# Patient Record
Sex: FEMALE | Race: WHITE | NOT HISPANIC OR LATINO | Employment: OTHER | URBAN - METROPOLITAN AREA
[De-identification: names, ages, dates, MRNs, and addresses within clinical notes are randomized per-mention and may not be internally consistent; named-entity substitution may affect disease eponyms.]

---

## 2018-11-15 ENCOUNTER — OFFICE VISIT CONVERTED (OUTPATIENT)
Dept: NEUROSURGERY | Facility: CLINIC | Age: 28
End: 2018-11-15
Attending: PHYSICIAN ASSISTANT

## 2018-11-15 ENCOUNTER — CONVERSION ENCOUNTER (OUTPATIENT)
Dept: NEUROLOGY | Facility: CLINIC | Age: 28
End: 2018-11-15

## 2019-01-31 ENCOUNTER — HOSPITAL ENCOUNTER (OUTPATIENT)
Dept: PHYSICAL THERAPY | Facility: CLINIC | Age: 29
Discharge: HOME | End: 2019-01-31
Attending: EMERGENCY MEDICINE

## 2019-05-04 ENCOUNTER — HOSPITAL ENCOUNTER (OUTPATIENT)
Dept: URGENT CARE | Facility: CLINIC | Age: 29
Discharge: HOME | End: 2019-05-04
Attending: FAMILY MEDICINE

## 2019-05-06 LAB — BACTERIA SPEC AEROBE CULT: NORMAL

## 2019-06-04 ENCOUNTER — HOSPITAL ENCOUNTER (OUTPATIENT)
Dept: URGENT CARE | Facility: CLINIC | Age: 29
Discharge: HOME | End: 2019-06-04
Attending: FAMILY MEDICINE

## 2019-08-22 ENCOUNTER — HOSPITAL ENCOUNTER (OUTPATIENT)
Dept: URGENT CARE | Facility: CLINIC | Age: 29
Discharge: HOME | End: 2019-08-22

## 2019-12-04 ENCOUNTER — OFFICE VISIT CONVERTED (OUTPATIENT)
Dept: PODIATRY | Facility: CLINIC | Age: 29
End: 2019-12-04
Attending: PODIATRIST

## 2019-12-11 ENCOUNTER — HOSPITAL ENCOUNTER (OUTPATIENT)
Dept: URGENT CARE | Facility: CLINIC | Age: 29
Discharge: HOME | End: 2019-12-11
Attending: FAMILY MEDICINE

## 2019-12-17 ENCOUNTER — HOSPITAL ENCOUNTER (OUTPATIENT)
Dept: GENERAL RADIOLOGY | Facility: HOSPITAL | Age: 29
Discharge: HOME | End: 2019-12-17
Attending: PODIATRIST

## 2019-12-18 ENCOUNTER — OFFICE VISIT CONVERTED (OUTPATIENT)
Dept: PODIATRY | Facility: CLINIC | Age: 29
End: 2019-12-18
Attending: PODIATRIST

## 2019-12-18 ENCOUNTER — CONVERSION ENCOUNTER (OUTPATIENT)
Dept: PODIATRY | Facility: CLINIC | Age: 29
End: 2019-12-18

## 2020-11-24 ENCOUNTER — OFFICE VISIT CONVERTED (OUTPATIENT)
Dept: SURGERY | Facility: CLINIC | Age: 30
End: 2020-11-24
Attending: SURGERY

## 2020-12-05 ENCOUNTER — HOSPITAL ENCOUNTER (OUTPATIENT)
Dept: PREADMISSION TESTING | Facility: HOSPITAL | Age: 30
Discharge: HOME | End: 2020-12-05
Attending: SURGERY

## 2020-12-06 LAB — SARS-COV-2 RNA SPEC QL NAA+PROBE: NOT DETECTED

## 2020-12-10 ENCOUNTER — HOSPITAL ENCOUNTER (OUTPATIENT)
Dept: PERIOP | Facility: HOSPITAL | Age: 30
Setting detail: HOSPITAL OUTPATIENT SURGERY
Discharge: HOME | End: 2020-12-10
Attending: SURGERY

## 2020-12-10 LAB — HCG UR QL: NEGATIVE

## 2020-12-21 ENCOUNTER — OFFICE VISIT CONVERTED (OUTPATIENT)
Dept: SURGERY | Facility: CLINIC | Age: 30
End: 2020-12-21
Attending: NURSE PRACTITIONER

## 2020-12-23 ENCOUNTER — OFFICE VISIT CONVERTED (OUTPATIENT)
Dept: SURGERY | Facility: CLINIC | Age: 30
End: 2020-12-23
Attending: NURSE PRACTITIONER

## 2020-12-29 ENCOUNTER — OFFICE VISIT CONVERTED (OUTPATIENT)
Dept: SURGERY | Facility: CLINIC | Age: 30
End: 2020-12-29
Attending: SURGERY

## 2021-05-10 NOTE — H&P
History and Physical      Patient Name: Meghan Lui   Patient ID: [id number]   Sex: Female   YOB: 1990    Primary Care Provider: Dawna Flynn   Referring Provider: Dawna Flynn    Visit Date: December 23, 2020    Provider: DARINEL Wood   Location: [facility]   Location Address: [address] [place] [address]  [id number]   Location Phone: [phone]          Chief Complaint  · Follow Up      History Of Present Illness  Meghan Lui is a 30 year old /White female who is here to follow up follow-up exam.      Patient presents today for a follow-up visit  with complaints of incision open & draining. Patient underwent an excision of inflamed skin and subcutaneous left thigh on 12/10/2020 performed by Dr. Edd Jaramillo. Pathology was consistent with Hidradenitis. Patient denies any fever or chills.       Past Medical History  Disease Name Date Onset Notes   Arthritis --  --    Arthritis --  --    Bladder problem --  --    Blood Diseases --  --    Degenerative Disc Disease  --  --    Foot pain, left 12/04/2019 --    Heel pain --  --    Herniated Disc --  --    Hypothyroidism --  --    Ingrown toenail --  --    Leg pain --  --    Leg swelling --  --    Lumbago --  --    Migraines --  --    Mood disorder --  --    Muscle cramps --  --    Numbness in feet --  --    Thyroid disease --  --          Past Surgical History  Procedure Name Date Notes   C section --  --    Cholecystecomy --  --    Dilatation and curettage --  --    Gallbladder --  --    [place] Tooth Extraction --  --          Medication List  Name Date Started Instructions   cyclobenzaprine 10 mg oral tablet  take 1 tablet (10 mg) by oral route 2 times per day   duloxetine 60 mg oral capsule,delayed release(/EC)  take 1 capsule (60 mg) by oral route once daily   gabapentin 600 mg oral tablet  take 1 tablet (600 mg) by oral route 3 times per day   hydrocodone-acetaminophen 7.5-325 mg oral tablet  take 1  "tablet by oral route every 6 hours as needed for pain   hydroxyzine HCl 25 mg oral tablet  take 1 tablet (25 mg) by oral route 3 times per day   levothyroxine 100 mcg oral tablet  take 1 tablet (100 mcg) by oral route once daily         Allergy List  Allergen Name Date Reaction Notes   NO KNOWN DRUG ALLERGIES --  --  --        Allergies Reconciled  Family Medical History  Disease Name Relative/Age Notes   Diabetes, unspecified type Mother/   Mother   Family history of Arthritis Sister/   Sister   Family history of cancer Father/   Father   Family history of diabetes mellitus Mother/   Mother         Social History  Finding Status Start/Stop Quantity Notes   Alcohol Current some day --/-- --  rarely drinks, less than 1 drink per day, has been drinking for 11-20 years   Homemaker --  --/-- --  --    lives alone --  --/-- --  --    Single --  --/-- --  --    Tobacco Current some day --/-- 0.5 PPD current some day smoker, 0.5 pack per day, smoked 11-20 years         Review of Systems  · Constitutional  o Denies  o : chills, fever  · Eyes  o Denies  o : yellowish discoloration of eyes  · HENT  o Denies  o : difficulty swallowing  · Cardiovascular  o Denies  o : chest pain on exertion  · Respiratory  o Denies  o : shortness of breath  · Gastrointestinal  o Denies  o : nausea, vomiting, diarrhea, constipation  · Genitourinary  o Denies  o : abnormal color of urine  · Integument  o Admits  o : changes to existing skin lesions or moles  o Denies  o : rash  · Neurologic  o Denies  o : tingling or numbness  · Musculoskeletal  o Denies  o : joint pain  · Endocrine  o Denies  o : weight gain, weight loss      Vitals  Date Time BP Position Site L\R Cuff Size HR RR TEMP (F) WT  HT  BMI kg/m2 BSA m2 O2 Sat FR L/min FiO2        12/23/2020 09:44 AM       14  281lbs 4oz 6'  1\" 37.11 2.56             Physical Examination  · Constitutional  o Appearance  o : well developed, well-nourished, patient in no apparent distress  · Head and " Face  o Head  o :   § Inspection  § : atraumatic, normocephalic  o Face  o :   § Inspection  § : no facial lesions  · Eyes  o Conjunctivae  o : conjunctivae normal  o Sclerae  o : sclerae white  · Neck  o Inspection/Palpation  o : normal appearance, no masses or tenderness, trachea midline  · Respiratory  o Respiratory Effort  o : breathing unlabored  · Skin and Subcutaneous Tissue  o General Inspection  o : no lesions present, no areas of discoloration, skin turgor normal, texture normal  · Neurologic  o Mental Status Examination  o :   § Orientation  § : grossly oriented to person, place and time  § Attention  § : attention normal, concentration abilities normal  § Fund of Knowledge  § : fund of knowledge within normal limits, patient aware of current events  o Gait and Station  o : normal gait, able to stand without difficulty  · Psychiatric  o Judgement and Insight  o : judgment and insight intact  o Mood and Affect  o : mood normal, affect appropriate     Inner left thigh: Surgical incisions are c/d/I without erythema.  Anterior incision is where the patient is in question of. Could not get any drainage from this incision.               Assessment  · Hidradenitis     705.83/L73.2    Problems Reconciled  Plan  · Medications  o Medications have been Reconciled  o Transition of Care or Provider Policy  · Instructions  o Keep surgical incisions clean and dry.  o Follow-up with Dr. Jaramillo in 1 week.  o Electronically Identified Patient Education Materials Provided Electronically            Electronically Signed by: DARINEL Wood -Author on December 23, 2020 10:26:46 AM

## 2021-05-10 NOTE — H&P
History and Physical      Patient Name: Meghan Lui   Patient ID: [id number]   Sex: Female   YOB: 1990    Primary Care Provider: Dawna Flynn   Referring Provider: Dawna Flynn    Visit Date: December 21, 2020    Provider: DARINEL Wood   Location: [facility]   Location Address: [address] [place] [address]  [id number]   Location Phone: [phone]          Chief Complaint  · Follow Up Post Surgery      History Of Present Illness  Meghan Lui is a 30 year old /White female who is here to follow up status post surgery.      Patient presents today for a follow-up visit after undergoing an excision of inflamed skin and subcutaneous left thigh. Pathology was consistent with Hidradenitis. Patient denies any fever or chills. Denies any post operative complications.       Past Medical History  Disease Name Date Onset Notes   Arthritis --  --    Arthritis --  --    Bladder problem --  --    Blood Diseases --  --    Degenerative Disc Disease  --  --    Foot pain, left 12/04/2019 --    Heel pain --  --    Herniated Disc --  --    Hypothyroidism --  --    Ingrown toenail --  --    Leg pain --  --    Leg swelling --  --    Lumbago --  --    Migraines --  --    Mood disorder --  --    Muscle cramps --  --    Numbness in feet --  --    Thyroid disease --  --          Past Surgical History  Procedure Name Date Notes   C section --  --    Cholecystecomy --  --    Dilatation and curettage --  --    Gallbladder --  --    [place] Tooth Extraction --  --          Medication List  Name Date Started Instructions   cyclobenzaprine 10 mg oral tablet  take 1 tablet (10 mg) by oral route 2 times per day   duloxetine 60 mg oral capsule,delayed release(DR/EC)  take 1 capsule (60 mg) by oral route once daily   gabapentin 600 mg oral tablet  take 1 tablet (600 mg) by oral route 3 times per day   hydrocodone-acetaminophen 7.5-325 mg oral tablet  take 1 tablet by oral route every 6  "hours as needed for pain   hydroxyzine HCl 25 mg oral tablet  take 1 tablet (25 mg) by oral route 3 times per day   levothyroxine 100 mcg oral tablet  take 1 tablet (100 mcg) by oral route once daily         Allergy List  Allergen Name Date Reaction Notes   NO KNOWN DRUG ALLERGIES --  --  --        Allergies Reconciled  Family Medical History  Disease Name Relative/Age Notes   Diabetes, unspecified type Mother/   Mother   Family history of Arthritis Sister/   Sister   Family history of cancer Father/   Father   Family history of diabetes mellitus Mother/   Mother         Social History  Finding Status Start/Stop Quantity Notes   Alcohol Current some day --/-- --  rarely drinks, less than 1 drink per day, has been drinking for 11-20 years   Homemaker --  --/-- --  --    lives alone --  --/-- --  --    Single --  --/-- --  --    Tobacco Current some day --/-- 0.5 PPD current some day smoker, 0.5 pack per day, smoked 11-20 years         Review of Systems  · Constitutional  o Denies  o : chills, fever  · Eyes  o Denies  o : yellowish discoloration of eyes  · HENT  o Denies  o : difficulty swallowing  · Cardiovascular  o Denies  o : chest pain on exertion  · Respiratory  o Denies  o : shortness of breath  · Gastrointestinal  o Denies  o : nausea, vomiting, diarrhea, constipation  · Genitourinary  o Denies  o : abnormal color of urine  · Integument  o Denies  o : rash  · Neurologic  o Denies  o : tingling or numbness  · Musculoskeletal  o Denies  o : joint pain  · Endocrine  o Denies  o : weight gain, weight loss      Vitals  Date Time BP Position Site L\R Cuff Size HR RR TEMP (F) WT  HT  BMI kg/m2 BSA m2 O2 Sat FR L/min FiO2        12/21/2020 03:30 PM       14  280lbs 0oz 6'  1\" 36.94 2.56             Physical Examination  · Constitutional  o Appearance  o : well developed, well-nourished, patient in no apparent distress  · Head and Face  o Head  o :   § Inspection  § : atraumatic, normocephalic  o Face  o : "   § Inspection  § : no facial lesions  · Eyes  o Conjunctivae  o : conjunctivae normal  o Sclerae  o : sclerae white  · Neck  o Inspection/Palpation  o : normal appearance, no masses or tenderness, trachea midline  · Respiratory  o Respiratory Effort  o : breathing unlabored  · Skin and Subcutaneous Tissue  o General Inspection  o : no lesions present, no areas of discoloration, skin turgor normal, texture normal  · Neurologic  o Mental Status Examination  o :   § Orientation  § : grossly oriented to person, place and time  § Attention  § : attention normal, concentration abilities normal  § Fund of Knowledge  § : fund of knowledge within normal limits, patient aware of current events  o Gait and Station  o : normal gait, able to stand without difficulty  · Psychiatric  o Judgement and Insight  o : judgment and insight intact  o Mood and Affect  o : mood normal, affect appropriate     Inner left thigh: Surgical incisions are c/d/I without erythema. Sutures are present. Removed all sutures today and applied steri strips.           Assessment  · Postoperative Exam Following Surgery     V67.00/Z09  · Hidradenitis     705.83/L73.2      Plan  · Medications  o Medications have been Reconciled  o Transition of Care or Provider Policy  · Instructions  o Keep surgical incisions clean and dry.  o Follow-up with Dr. Jaramillo in 1 week.  o Electronically Identified Patient Education Materials Provided Electronically  · Disposition  o Call or Return if symptoms worsen or persist.            Electronically Signed by: DARINEL Wood -Author on December 21, 2020 07:42:27 PM

## 2021-05-10 NOTE — H&P
History and Physical      Patient Name: Meghan Lui   Patient ID: [id number]   Sex: Female   YOB: 1990    Primary Care Provider: Dawna Flynn   Referring Provider: Dawna Flynn    Visit Date: November 24, 2020    Provider: Edd Jaramillo MD   Location: [facility]   Location Address: [address] [place] [address]  [id number]   Location Phone: [phone]          Chief Complaint  · Outpatient History & Physical / Surgical Orders      History Of Present Illness     Ms. Lui is a 30-year-old female who presents with left thigh hidradenitis. She notes this has been present intermittently for some time.       Past Medical History  Arthritis; Arthritis; Bladder problem; Blood Diseases; Degenerative Disc Disease ; Foot pain, left; Heel pain; Herniated Disc; Hypothyroidism; Ingrown toenail; Leg pain; Leg swelling; Lumbago; Migraines; Mood disorder; Muscle cramps; Numbness in feet; Thyroid disease         Past Surgical History  C section; Cholecystecomy; Dilatation and curettage; Gallbladder; [place] Tooth Extraction         Medication List  cyclobenzaprine 10 mg oral tablet; diclofenac sodium 75 mg oral tablet,delayed release (DR/EC); duloxetine 60 mg oral capsule,delayed release(DR/EC); gabapentin 600 mg oral tablet; hydrocodone-acetaminophen 7.5-325 mg oral tablet; hydroxyzine HCl 25 mg oral tablet; levothyroxine 100 mcg oral tablet         Allergy List  NO KNOWN DRUG ALLERGIES         Family Medical History  Diabetes, unspecified type; Family history of Arthritis; Family history of cancer; Family history of diabetes mellitus         Social History  Alcohol (Current some day); Homemaker; lives alone; Single; Tobacco (Current some day)         Review of Systems  · Gastrointestinal  o Denies  o : nausea, vomiting, diarrhea, constipation, flank pain      Vitals  Date Time BP Position Site L\R Cuff Size HR RR TEMP (F) WT  HT  BMI kg/m2 BSA m2 O2 Sat FR L/min FiO2 HC       11/24/2020  "09:51 AM       14  280lbs 0oz 6'  1\" 36.94 2.56             Physical Examination  · Constitutional  o Appearance  o : reveals patient to be in no acute distress  · Head and Face  o HEENT  o : shows sclera to be nonicteric  · Respiratory  o Respiratory  o : chest is clear  · Cardiovascular  o Heart  o : regular rate and rhythm without murmurs, gallops or rubs  · Gastrointestinal  o Abdominal Examination  o :   § Abdomen  § : abdomen is soft and nontender, bowel sounds are present, no masses, gaurding or rebound were noted   · Musculoskeletal  o Extremeties/Joint  o : extremities show a ful range of motion  · Neurologic  o Neurologic/Reflexes  o : intact          Assessment  · Hidradenitis     705.83/L73.2  Left thigh  · Pre-op testing     V72.84/Z01.818      Plan  · Orders  o [facility] Pre-Op Covid-19 Screening (88951) - V72.84/Z01.818 - 12/05/2020 12/5/20 @1:15pm. [address]  o Surgery Order ([facility]) - 705.83/L73.2 - 12/10/2020  · Medications  o Medications have been Reconciled  o Transition of Care or Provider Policy  · Instructions  o PLAN:  o Handouts Provided-Pre-Procedure Instructions including date and time and location of procedure.  o Surgical Facility: [facility]  o ****Surgical Orders****  o RISK AND BENEFITS:  o Consent for surgery: Given these options, the patient has verbally expressed an understanding of the risks of surgery and finds these risks acceptable. We will proceed with surgery as soon as possible.  o Consult Anesthesia for any post operative block, or any pain management procedure deemed necessary by the anesthesiologist for adequate post-operative pain control.  o O.R. PREP: Per protocol  o IV: Per Anesthesia  o IV: LR@ 75ml/hr  o PLEASE SIGN PERMIT FOR: Excision of left thigh hidradenitis  o Kefzol 1 gram IV on call to OR.  o The above History and Physical Examination has been completed within 30 days of admission.  o ****Patient Status****  o Outpatient  o Pre-Admission " Testing Date: Phone 12/3/20 @2pm  o Electronically Identified Patient Education Materials Provided Electronically  · Referrals  o ID: [id number] Date: 11/23/2020 Type: Inbound  Specialty: General Surgery            Electronically Signed by: Lyla Chen-, -Author on December 1, 2020 12:18:08 PM  Electronically Co-signed by: Edd Jaramillo MD -Reviewer on December 8, 2020 04:14:43 PM

## 2021-05-14 VITALS — HEIGHT: 72 IN | BODY MASS INDEX: 39.35 KG/M2 | WEIGHT: 290.5 LBS

## 2021-05-14 VITALS — BODY MASS INDEX: 38.09 KG/M2 | RESPIRATION RATE: 14 BRPM | HEIGHT: 72 IN | WEIGHT: 281.25 LBS

## 2021-05-14 VITALS — HEIGHT: 72 IN | BODY MASS INDEX: 37.93 KG/M2 | WEIGHT: 280 LBS | RESPIRATION RATE: 14 BRPM

## 2021-05-14 VITALS — HEIGHT: 72 IN | RESPIRATION RATE: 14 BRPM | BODY MASS INDEX: 37.93 KG/M2 | WEIGHT: 280 LBS

## 2021-05-14 NOTE — PROGRESS NOTES
Progress Note      Patient Name: Meghan Lui   Patient ID: [id number]   Sex: Female   YOB: 1990    Primary Care Provider: Dawna Flynn   Referring Provider: Dawna Flynn    Visit Date: December 29, 2020    Provider: Edd Jaramillo MD   Location: [facility]   Location Address: [address] [place] [address]  [id number]   Location Phone: [phone]          Chief Complaint  · Follow Up Office Visit      History Of Present Illness     Ms. Lui is seen in follow-up. She had excision of hidradenitis.       Past Medical History  Arthritis; Arthritis; Bladder problem; Blood Diseases; Degenerative Disc Disease ; Foot pain, left; Heel pain; Herniated Disc; Hypothyroidism; Ingrown toenail; Leg pain; Leg swelling; Lumbago; Migraines; Mood disorder; Muscle cramps; Numbness in feet; Thyroid disease         Past Surgical History  C section; Cholecystecomy; Dilatation and curettage; Gallbladder; [place] Tooth Extraction         Medication List  cyclobenzaprine 10 mg oral tablet; duloxetine 60 mg oral capsule,delayed release(DR/EC); gabapentin 600 mg oral tablet; hydrocodone-acetaminophen 7.5-325 mg oral tablet; hydroxyzine HCl 25 mg oral tablet; levothyroxine 100 mcg oral tablet         Allergy List  NO KNOWN DRUG ALLERGIES         Family Medical History  Diabetes, unspecified type; Family history of Arthritis; Family history of cancer; Family history of diabetes mellitus         Social History  Alcohol (Current some day); Homemaker; lives alone; Single; Tobacco (Current some day)         Review of Systems  · Cardiovascular  o Denies  o : chest pain on exertion, shortness of breath, lower extremity swelling  · Respiratory  o Denies  o : wheezing, chronic cough, coughing up blood  · Gastrointestinal  o Denies  o : diarrhea, chronic abdominal pain, reflux symptoms      Vitals  Date Time BP Position Site L\R Cuff Size HR RR TEMP (F) WT  HT  BMI kg/m2 BSA m2 O2 Sat FR L/min FiO2 HC      "  12/29/2020 08:44 AM         290lbs 8oz 6'  1\" 38.33 2.61             Physical Examination     The wound is healing and sutures were removed. No cellulitis. Minimal drainage.           Assessment  · Encounter for examination following surgery     V67.00/Z09      Plan  · Medications  o Medications have been Reconciled  o Transition of Care or Provider Policy  · Instructions  o Electronically Identified Patient Education Materials Provided Electronically  · Referrals  o ID: [id number] Date: 11/23/2020 Type: Inbound  Specialty: General Surgery     She will continue to clean this area and follow-up on a PRN basis.             Electronically Signed by: Lyla Chen-, -Author on January 8, 2021 02:38:26 PM  Electronically Co-signed by: Edd Jaramillo MD -Reviewer on January 12, 2021 11:51:17 AM  "

## 2021-05-15 VITALS
OXYGEN SATURATION: 100 % | SYSTOLIC BLOOD PRESSURE: 131 MMHG | DIASTOLIC BLOOD PRESSURE: 73 MMHG | BODY MASS INDEX: 39.68 KG/M2 | HEART RATE: 73 BPM | WEIGHT: 293 LBS | HEIGHT: 72 IN

## 2021-05-15 VITALS
OXYGEN SATURATION: 97 % | WEIGHT: 293 LBS | DIASTOLIC BLOOD PRESSURE: 90 MMHG | SYSTOLIC BLOOD PRESSURE: 126 MMHG | HEART RATE: 58 BPM | BODY MASS INDEX: 39.68 KG/M2 | HEIGHT: 72 IN

## 2021-05-16 VITALS
WEIGHT: 293 LBS | BODY MASS INDEX: 39.68 KG/M2 | SYSTOLIC BLOOD PRESSURE: 144 MMHG | DIASTOLIC BLOOD PRESSURE: 102 MMHG | HEART RATE: 83 BPM | HEIGHT: 72 IN

## 2021-06-07 ENCOUNTER — TRANSCRIBE ORDERS (OUTPATIENT)
Dept: ADMINISTRATIVE | Facility: HOSPITAL | Age: 31
End: 2021-06-07

## 2021-06-07 DIAGNOSIS — E04.1 THYROID NODULE: Primary | ICD-10-CM

## 2021-06-10 ENCOUNTER — HOSPITAL ENCOUNTER (OUTPATIENT)
Dept: ULTRASOUND IMAGING | Facility: HOSPITAL | Age: 31
Discharge: HOME | End: 2021-06-10
Admitting: INTERNAL MEDICINE

## 2021-06-10 DIAGNOSIS — E04.1 THYROID NODULE: ICD-10-CM

## 2021-06-10 PROCEDURE — 76536 US EXAM OF HEAD AND NECK: CPT

## 2021-06-10 PROCEDURE — 76536 US EXAM OF HEAD AND NECK: CPT | Performed by: RADIOLOGY

## 2021-08-10 ENCOUNTER — OFFICE VISIT (OUTPATIENT)
Dept: ORTHOPEDIC SURGERY | Facility: CLINIC | Age: 31
End: 2021-08-10

## 2021-08-10 VITALS — OXYGEN SATURATION: 98 % | BODY MASS INDEX: 39.68 KG/M2 | WEIGHT: 293 LBS | HEART RATE: 88 BPM | HEIGHT: 72 IN

## 2021-08-10 DIAGNOSIS — M94.20 CHONDROMALACIA: Primary | ICD-10-CM

## 2021-08-10 PROCEDURE — 99203 OFFICE O/P NEW LOW 30 MIN: CPT | Performed by: ORTHOPAEDIC SURGERY

## 2021-08-10 RX ORDER — GABAPENTIN 800 MG/1
800 TABLET ORAL 2 TIMES DAILY
COMMUNITY
Start: 2021-07-25

## 2021-08-10 RX ORDER — IBUPROFEN 800 MG/1
TABLET ORAL SEE ADMIN INSTRUCTIONS
COMMUNITY
Start: 2021-06-30 | End: 2022-12-22

## 2021-08-10 RX ORDER — HYDROXYZINE HYDROCHLORIDE 25 MG/1
TABLET, FILM COATED ORAL
COMMUNITY
End: 2022-12-22

## 2021-08-10 RX ORDER — ALBUTEROL SULFATE 90 UG/1
AEROSOL, METERED RESPIRATORY (INHALATION)
COMMUNITY
End: 2022-12-22

## 2021-08-10 RX ORDER — HYDROCODONE BITARTRATE AND ACETAMINOPHEN 7.5; 325 MG/1; MG/1
TABLET ORAL
COMMUNITY
End: 2022-12-22

## 2021-08-10 RX ORDER — LEVOTHYROXINE SODIUM 125 MCG
137 TABLET ORAL DAILY
COMMUNITY
Start: 2021-06-07 | End: 2022-12-22

## 2021-08-10 RX ORDER — PRAZOSIN HYDROCHLORIDE 1 MG/1
1 CAPSULE ORAL WEEKLY
COMMUNITY
Start: 2021-06-25 | End: 2022-12-22

## 2021-08-10 RX ORDER — CYCLOBENZAPRINE HCL 10 MG
TABLET ORAL
COMMUNITY
End: 2022-12-22

## 2021-08-10 RX ORDER — BUPROPION HYDROCHLORIDE 150 MG/1
150 TABLET, EXTENDED RELEASE ORAL 2 TIMES DAILY
COMMUNITY
Start: 2021-07-22 | End: 2022-12-22

## 2021-08-10 NOTE — PROGRESS NOTES
"Chief Complaint  Pain of the Left Knee and Pain of the Right Knee     Subjective      Meghan Lui presents to [facility] for evaluation of bilateral knee pain. The patient reports she has always had knee problems. She reports her right knee has recently started swelling and more painful. She reports the right is worse than the left. She has no known injury or trauma. She is in pain management for her back and is prescribed gabapentin and Norco. She reports grinding in her knees. She also admits to some instability. She has been taking diclofenac as well.     No Known Allergies     Social History     Socioeconomic History   • Marital status: Single     Spouse name: Not on file   • Number of children: Not on file   • Years of education: Not on file   • Highest education level: Not on file   Tobacco Use   • Smoking status: Former Smoker   • Smokeless tobacco: Never Used        Review of Systems     Objective   Vital Signs:   Pulse 88   Ht 185.4 cm (73\")   Wt (!) 145 kg (319 lb 9.6 oz)   SpO2 98%   BMI 42.17 kg/m²       Physical Exam  Constitutional:       Appearance: Normal appearance. He is well-developed and normal weight.   HENT:      Head: Normocephalic.      Right Ear: Hearing and external ear normal.      Left Ear: Hearing and external ear normal.      Nose: Nose normal.   Eyes:      Conjunctiva/sclera: Conjunctivae normal.   Cardiovascular:      Rate and Rhythm: Normal rate.   Pulmonary:      Effort: Pulmonary effort is normal.      Breath sounds: No wheezing or rales.   Abdominal:      Palpations: Abdomen is soft.      Tenderness: There is no abdominal tenderness.   Musculoskeletal:      Cervical back: Normal range of motion.   Skin:     Findings: No rash.   Neurological:      Mental Status: He is alert and oriented to person, place, and time.   Psychiatric:         Mood and Affect: Mood and affect normal.         Judgment: Judgment normal.       Ortho Exam      Bilateral " knee- Right Knee ROM 0-115 degrees. Crepitus with ROM. Left knee- mild crepitus. ROM 0-120 degrees. Pain with patella compression. Tender to anterior knee. Stable to varus/valgus stress. Stable to anterior/posterior drawer. Negative Dave's. Neurovascularly intact.     Procedures    [place] X-ray 5/2021- Negative bilateral knees.      Imaging Results (Most Recent)     None           Result Review :       No results found.           Assessment and Plan     DX: bilateral knee chondromalacia      Discussed the treatment plan with the patient.  Plan to continue diclofenac medication. Order for physical therapy given today.     Call or return if worsening symptoms.    Follow Up     6 weeks to recheck.       Patient was given instructions and counseling regarding her condition or for health maintenance advice. Please see specific information pulled into the AVS if appropriate.     Scribed for Steve Valle MD by Amna Gomez.  08/10/21   13:23 EDT  I have personally performed the services described in this document as scribed by the above individual and it is both accurate and complete. Steve Valle MD 08/15/21

## 2021-09-24 ENCOUNTER — TREATMENT (OUTPATIENT)
Dept: PHYSICAL THERAPY | Facility: CLINIC | Age: 31
End: 2021-09-24

## 2021-09-24 DIAGNOSIS — G89.29 CHRONIC PAIN OF BOTH KNEES: ICD-10-CM

## 2021-09-24 DIAGNOSIS — M25.561 CHRONIC PAIN OF BOTH KNEES: ICD-10-CM

## 2021-09-24 DIAGNOSIS — M25.562 CHRONIC PAIN OF BOTH KNEES: ICD-10-CM

## 2021-09-24 DIAGNOSIS — M94.20 CHONDROMALACIA: Primary | ICD-10-CM

## 2021-09-24 PROCEDURE — 97110 THERAPEUTIC EXERCISES: CPT | Performed by: PHYSICAL THERAPIST

## 2021-09-24 PROCEDURE — 97161 PT EVAL LOW COMPLEX 20 MIN: CPT | Performed by: PHYSICAL THERAPIST

## 2021-09-24 NOTE — PROGRESS NOTES
Physical Therapy Initial Evaluation and Plan of Care      Patient: Meghan Lui   : 1990  Diagnosis/ICD-10 Code:  Chondromalacia [M94.20]  Referring practitioner: Steve Valle MD  Date of Initial Visit: 2021  Today's Date: 2021  Patient seen for 1 sessions           Subjective Questionnaire: LEFS: 50/80=37% limitation      Subjective Evaluation    History of Present Illness  Mechanism of injury: Patient is a 31 yr old female referred to physical therapy with diagnosis of chrondromaalcia.  Patient states bilateral knee pain with right worse than left.  Patient states has OA and degenerative disease.  Patient states swelling in right knee with pain in posterior right knee.  Patient reports knee pain for at least 10 yrs.     Pain  Current pain ratin  At best pain ratin  At worst pain ratin  Relieving factors: medications, ice, heat and rest    Patient Goals  Patient goals for therapy: decreased pain  Patient goal: Patient states would like to manage right knee edema.            Objective          Neurological Testing     Additional Neurological Details  Patient reports neuropathy in both legs and legs numbness/tingling (right whole leg and left from knee to toes)    Active Range of Motion   Left Hip   Normal active range of motion    Right Hip   Normal active range of motion  Left Knee   Extension: 3 degrees     Right Knee   Extension: 5 degrees     Additional Active Range of Motion Details  Patient unable to achieve neutral extension; noted bilateral hamstring tightness    Strength/Myotome Testing     Left Hip   Planes of Motion   Flexion: 4  Extension: 4-  Abduction: 4    Right Hip   Planes of Motion   Flexion: 4  Extension: 4-  Abduction: 4    Left Knee   Flexion: 4  Extension: 4+    Right Knee   Flexion: 4  Extension: 4-          Assessment & Plan     Assessment  Impairments: abnormal or restricted ROM, activity intolerance, impaired physical strength, lacks appropriate  home exercise program and pain with function  Assessment details: Pt presents with limitations, noted by evaluation that impede patient's ability to tolerate functional mobility/activity.  The skills of a therapist will be required to safely and effectively implement the following treatment plan to restore maximal level of function.    Prognosis: good  Functional Limitations: walking, uncomfortable because of pain, sitting, standing and unable to perform repetitive tasks  Goals  Plan Goals: 1. The patient complains of bilateral knee pain.   LTG 1: 8weeks:  The patient will report a pain rating of 2/10 or better in order to improve tolerance to activities of daily living and improve sleep quality.   STATUS:  New   STG 1a: 4 weeks:  The patient will report a pain rating of 4/10 or better.   STATUS:  New      2. The patient demonstrates weakness of the bilateral hip.   LTG 2: 8weeks:  The patient will demonstrate 5/5 strength for bilateral hip flexion, abduction, and extension in order to improve hip stability and 5/5 bilateral knee flex/extend.  STATUS:  New   STG 2a: 4weeks:  The patient will demonstrate 4+/5 strength for bilateral hip flexion, abduction, and extension.  STATUS:  New      3. Mobility: Walking/Moving Around Functional Limitation     LTG 3:8 weeks:  The patient will demonstrate 12% limitation by achieving a score of 70/80 on the Lower Extremity Functional Scale.   STATUS:  New   STG 3a: 4 weeks:  The patient will demonstrate 25% limitation by achieving a score of 60/80 on the Lower Extremity Functional Scale.     STATUS:  New     TREATMENT:  Therapeutic exercises, manual therapy, aquatic therapy, home exercise instruction, and modalities as needed for pain to include:  electrical stimulation, moist heat, ice, and  ultrasound      Plan  Therapy options: will be seen for skilled physical therapy services  Planned modality interventions: TENS, thermotherapy (hydrocollator packs) and cryotherapy  Planned  therapy interventions: abdominal trunk stabilization, manual therapy, strengthening, stretching, functional ROM exercises, flexibility, home exercise program and soft tissue mobilization  Frequency: 2x week  Duration in weeks: 8  Treatment plan discussed with: patient        Visit Diagnoses:    ICD-10-CM ICD-9-CM   1. Chondromalacia  M94.20 733.92   2. Chronic pain of both knees  M25.561 719.46    M25.562 338.29    G89.29        Timed:  Manual Therapy:         mins  92148;  Therapeutic Exercise:    9     mins  09478;     Neuromuscular Nuris:        mins  48659;    Therapeutic Activity:          mins  76901;     Gait Training:           mins  87785;     Ultrasound:          mins  50031;    Electrical Stimulation:         mins  45321 ( );    Untimed:  Electrical Stimulation:         mins  72686 ( );  Mechanical Traction:         mins  43029;   PT evaluation   32 mins    Timed Treatment:   9   mins   Total Treatment:     41   mins    PT SIGNATURE: Naima Fontanez PT         Initial Certification  Certification Period: 9/24/2021 thru 12/23/2021  I certify that the therapy services are furnished while this patient is under my care.  The services outlined above are required by this patient, and will be reviewed every 90 days.     PHYSICIAN: Steve Valle MD      DATE:     Please sign and return via fax to [phone]  Thank you, [facility].

## 2021-09-29 ENCOUNTER — TREATMENT (OUTPATIENT)
Dept: PHYSICAL THERAPY | Facility: CLINIC | Age: 31
End: 2021-09-29

## 2021-09-29 DIAGNOSIS — G89.29 CHRONIC PAIN OF BOTH KNEES: ICD-10-CM

## 2021-09-29 DIAGNOSIS — M25.562 CHRONIC PAIN OF BOTH KNEES: ICD-10-CM

## 2021-09-29 DIAGNOSIS — M94.20 CHONDROMALACIA: Primary | ICD-10-CM

## 2021-09-29 DIAGNOSIS — M25.561 CHRONIC PAIN OF BOTH KNEES: ICD-10-CM

## 2021-09-29 PROCEDURE — 97110 THERAPEUTIC EXERCISES: CPT | Performed by: PHYSICAL THERAPIST

## 2021-09-29 NOTE — PROGRESS NOTES
Physical Therapy Daily Treatment Note      Patient: Meghan Lui   : 1990  Referring practitioner: Steve Valle MD  Date of Initial Visit: Type: THERAPY  Noted: 2021  Today's Date: 2021  Patient seen for 2 sessions           Subjective Questionnaire:       Subjective Evaluation    History of Present Illness    Subjective comment: Pt presents to clinic with B knee pain R more than L.  Pt's R knee pain is 6/10 and L knee pain 4/10.  Pt reports randomly she will have posterior R  knee pain and knee swells anteriorly and the knee cap moves easily. 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       Assessment & Plan     Assessment  Assessment details: Pt tolerated therapeutic exercise well.        Visit Diagnoses:    ICD-10-CM ICD-9-CM   1. Chondromalacia  M94.20 733.92   2. Chronic pain of both knees  M25.561 719.46    M25.562 338.29    G89.29        Progress per Plan of Care and Progress strengthening /stabilization /functional activity           Timed:  Manual Therapy:         mins  86664;  Therapeutic Exercise:   32      mins  88522;     Neuromuscular Nuris:        mins  61354;    Therapeutic Activity:          mins  76555;     Gait Training:           mins  21164;     Ultrasound:          mins  14792;    Electrical Stimulation:         mins  01845 ( );  Aquatic Therapy          mins  21668    Untimed:  Electrical Stimulation:         mins  38133 ( );  Mechanical Traction:         mins  49727;     Timed Treatment:   32   mins   Total Treatment:     32   mins  Stephanie Hoang PTA  Physical Therapist

## 2021-10-01 ENCOUNTER — TREATMENT (OUTPATIENT)
Dept: PHYSICAL THERAPY | Facility: CLINIC | Age: 31
End: 2021-10-01

## 2021-10-01 DIAGNOSIS — M25.562 CHRONIC PAIN OF BOTH KNEES: ICD-10-CM

## 2021-10-01 DIAGNOSIS — M94.20 CHONDROMALACIA: Primary | ICD-10-CM

## 2021-10-01 DIAGNOSIS — M25.561 CHRONIC PAIN OF BOTH KNEES: ICD-10-CM

## 2021-10-01 DIAGNOSIS — G89.29 CHRONIC PAIN OF BOTH KNEES: ICD-10-CM

## 2021-10-01 PROCEDURE — 97110 THERAPEUTIC EXERCISES: CPT | Performed by: PHYSICAL THERAPIST

## 2021-10-01 NOTE — PROGRESS NOTES
Physical Therapy Daily Progress Note        Patient: Meghan Lui   : 1990  Diagnosis/ICD-10 Code:  Chondromalacia [M94.20]  Referring practitioner: Steve Valle MD  Date of Initial Visit: Type: THERAPY  Noted: 2021  Today's Date: 10/1/2021  Patient seen for 3 sessions             Subjective   Meghan Lui reports: knee being a little more sore following last PT session, states that majority of pain is on the back of the knee.     Objective   Minimal discomfort with Long Arc Quads and stretches    See Exercise, Manual, and Modality Logs for complete treatment.       Assessment/Plan  Meghan still experiencing increased B knee pain, especially in the back of the knee. Pt had some increased discomfort with LAQs. Pt would benefit from skilled PT to address Range of Motion  and Strength deficits, pain management and any concerns with ADLs.     Progress per Plan of Care           Timed:  Manual Therapy:         mins  50803;  Therapeutic Exercise:    30     mins  69415;     Neuromuscular Nuris:        mins  54820;    Therapeutic Activity:          mins  61522;     Gait Training:           mins  20027;    Aquatic Therapy:          mins  87074;       Untimed:  Electrical Stimulation:         mins  84194 ( );  Mechanical Traction:         mins  13523;       Timed Treatment:   30   mins   Total Treatment:     30   mins        Tori Sutherland PTA  Physical Therapist Assistant

## 2021-10-06 ENCOUNTER — TREATMENT (OUTPATIENT)
Dept: PHYSICAL THERAPY | Facility: CLINIC | Age: 31
End: 2021-10-06

## 2021-10-06 DIAGNOSIS — M25.562 CHRONIC PAIN OF BOTH KNEES: ICD-10-CM

## 2021-10-06 DIAGNOSIS — M94.20 CHONDROMALACIA: Primary | ICD-10-CM

## 2021-10-06 DIAGNOSIS — G89.29 CHRONIC PAIN OF BOTH KNEES: ICD-10-CM

## 2021-10-06 DIAGNOSIS — M25.561 CHRONIC PAIN OF BOTH KNEES: ICD-10-CM

## 2021-10-06 PROCEDURE — 97110 THERAPEUTIC EXERCISES: CPT | Performed by: PHYSICAL THERAPIST

## 2021-10-06 NOTE — PROGRESS NOTES
Physical Therapy Daily Progress Note        Patient: Meghan Lui   : 1990  Diagnosis/ICD-10 Code:  Chondromalacia [M94.20]  Referring practitioner: Steve Valle MD  Date of Initial Visit: Type: THERAPY  Noted: 2021  Today's Date: 10/6/2021  Patient seen for 4 sessions             Subjective   Meghan Lui reports: hip being sore following last PT session, knee are hurting especially with the change in weather.     Objective   Increased discomfort in L knee with Hamstring curls.     See Exercise, Manual, and Modality Logs for complete treatment.       Assessment/Plan  Meghan still experiencing increased B knee pain, especially with the change in weather. Pt had some increased discomfort with Hamstring Curls. Pt would benefit from skilled PT to address Range of Motion  and Strength deficits, pain management and any concerns with ADLs.     Progress per Plan of Care           Timed:  Manual Therapy:         mins  36932;  Therapeutic Exercise:    30     mins  24223;     Neuromuscular Nuris:        mins  29999;    Therapeutic Activity:          mins  07405;     Gait Training:           mins  31699;    Aquatic Therapy:          mins  40351;       Untimed:  Electrical Stimulation:         mins  99992 ( );  Mechanical Traction:         mins  00052;       Timed Treatment:   30   mins   Total Treatment:     30   mins        Tori Sutherland PTA  Physical Therapist Assistant

## 2021-10-08 ENCOUNTER — TREATMENT (OUTPATIENT)
Dept: PHYSICAL THERAPY | Facility: CLINIC | Age: 31
End: 2021-10-08

## 2021-10-08 DIAGNOSIS — M25.561 CHRONIC PAIN OF BOTH KNEES: ICD-10-CM

## 2021-10-08 DIAGNOSIS — M94.20 CHONDROMALACIA: Primary | ICD-10-CM

## 2021-10-08 DIAGNOSIS — G89.29 CHRONIC PAIN OF BOTH KNEES: ICD-10-CM

## 2021-10-08 DIAGNOSIS — M25.562 CHRONIC PAIN OF BOTH KNEES: ICD-10-CM

## 2021-10-08 PROCEDURE — 97110 THERAPEUTIC EXERCISES: CPT | Performed by: PHYSICAL THERAPIST

## 2021-10-08 NOTE — PROGRESS NOTES
Physical Therapy Daily Progress Note        Patient: Meghan Lui   : 1990  Diagnosis/ICD-10 Code:  Chondromalacia [M94.20]  Referring practitioner: Steve Valle MD  Date of Initial Visit: Type: THERAPY  Noted: 2021  Today's Date: 10/8/2021  Patient seen for 5 sessions             Subjective   Meghan Lui reports: being extra tired today and both her knees being sore.     Objective   No complaints of increased pain or discomfort.     See Exercise, Manual, and Modality Logs for complete treatment.       Assessment/Plan  Meghan still experiencing increased B knee pain. Pt tolerated exercises well, no complaint of increased pain or discomfort. Pt would benefit from skilled PT to address Range of Motion  and Strength deficits, pain management and any concerns with ADLs.     Progress per Plan of Care           Timed:  Manual Therapy:         mins  61166;  Therapeutic Exercise:    30     mins  56425;     Neuromuscular Nuris:        mins  94227;    Therapeutic Activity:          mins  14577;     Gait Training:          mins  83887;    Aquatic Therapy:          mins  83793;       Untimed:  Electrical Stimulation:         mins  98266 ( );  Mechanical Traction:         mins  19910;       Timed Treatment:   30   mins   Total Treatment:     30   mins        Tori Sutherland PTA  Physical Therapist Assistant

## 2021-10-15 ENCOUNTER — TREATMENT (OUTPATIENT)
Dept: PHYSICAL THERAPY | Facility: CLINIC | Age: 31
End: 2021-10-15

## 2021-10-15 DIAGNOSIS — M25.562 CHRONIC PAIN OF BOTH KNEES: ICD-10-CM

## 2021-10-15 DIAGNOSIS — M94.20 CHONDROMALACIA: Primary | ICD-10-CM

## 2021-10-15 DIAGNOSIS — M25.561 CHRONIC PAIN OF BOTH KNEES: ICD-10-CM

## 2021-10-15 DIAGNOSIS — G89.29 CHRONIC PAIN OF BOTH KNEES: ICD-10-CM

## 2021-10-15 PROCEDURE — 97110 THERAPEUTIC EXERCISES: CPT | Performed by: PHYSICAL THERAPIST

## 2021-10-15 NOTE — PROGRESS NOTES
Physical Therapy Daily Progress Note        Patient: Meghan Lui   : 1990  Diagnosis/ICD-10 Code:  Chondromalacia [M94.20]  Referring practitioner: Steve Valle MD  Date of Initial Visit: Type: THERAPY  Noted: 2021  Today's Date: 10/15/2021  Patient seen for 6 sessions             Subjective   Meghan Lui reports: knees not feeling good with the rainy weather.     Objective   Increased discomfort with resisted Hamstring Curls.     See Exercise, Manual, and Modality Logs for complete treatment.     Assessment/Plan  Meghan still experiencing increased B knee pain, especially with the rainy weather. Pt had some increased discomfort with Hamstring Curls. Pt would benefit from skilled PT to address Range of Motion  and Strength deficits, pain management and any concerns with ADLs.     Progress per Plan of Care           Timed:  Manual Therapy:         mins  82058;  Therapeutic Exercise:    30     mins  16905;     Neuromuscular Nuris:        mins  64322;    Therapeutic Activity:          mins  45958;     Gait Training:           mins  72629;    Aquatic Therapy:          mins  35210;       Untimed:  Electrical Stimulation:         mins  27719 ( );  Mechanical Traction:         mins  32966;       Timed Treatment:   30   mins   Total Treatment:     30   mins        Tori Sutherland PTA  Physical Therapist Assistant

## 2021-10-20 ENCOUNTER — TREATMENT (OUTPATIENT)
Dept: PHYSICAL THERAPY | Facility: CLINIC | Age: 31
End: 2021-10-20

## 2021-10-20 DIAGNOSIS — M94.20 CHONDROMALACIA: Primary | ICD-10-CM

## 2021-10-20 DIAGNOSIS — M25.561 CHRONIC PAIN OF BOTH KNEES: ICD-10-CM

## 2021-10-20 DIAGNOSIS — M25.562 CHRONIC PAIN OF BOTH KNEES: ICD-10-CM

## 2021-10-20 DIAGNOSIS — G89.29 CHRONIC PAIN OF BOTH KNEES: ICD-10-CM

## 2021-10-20 PROCEDURE — 97110 THERAPEUTIC EXERCISES: CPT | Performed by: PHYSICAL THERAPIST

## 2021-10-20 NOTE — PROGRESS NOTES
" Physical Therapy Daily Progress Note        Patient: Meghan Lui   : 1990  Diagnosis/ICD-10 Code:  Chondromalacia [M94.20]  Referring practitioner: Steve Valle MD  Date of Initial Visit: Type: THERAPY  Noted: 2021  Today's Date: 10/20/2021  Patient seen for 7 sessions             Subjective   Meghan Lui reports: knees feeling okay, the R knee popping over the weekend and she had to \"hit it\" so she didn't fall.     Objective   Increased popping with monster walks.     See Exercise, Manual, and Modality Logs for complete treatment.       Assessment/Plan  Meghan progressing as evident by decreased overall knee pain, although still popping. Pt tolerated exercises well, popping in R knee wither certain strengthening exercises. Pt would benefit from skilled PT to address Range of Motion  and Strength deficits, pain management and any concerns with ADLs.       Progress per Plan of Care           Timed:  Manual Therapy:         mins  59331;  Therapeutic Exercise:    30     mins  31802;     Neuromuscular Nuris:        mins  87319;    Therapeutic Activity:          mins  13226;     Gait Training:           mins  59258;    Aquatic Therapy:          mins  08294;       Untimed:  Electrical Stimulation:         mins  04284 ( );  Mechanical Traction:         mins  49465;       Timed Treatment:   30   mins   Total Treatment:     30   mins      Electronically signed:   Tori Sutherland PTA  Physical Therapist Assistant  [facility] License #: [id number]  "

## 2021-10-22 ENCOUNTER — TREATMENT (OUTPATIENT)
Dept: PHYSICAL THERAPY | Facility: CLINIC | Age: 31
End: 2021-10-22

## 2021-10-22 DIAGNOSIS — G89.29 CHRONIC PAIN OF BOTH KNEES: ICD-10-CM

## 2021-10-22 DIAGNOSIS — M25.561 CHRONIC PAIN OF BOTH KNEES: ICD-10-CM

## 2021-10-22 DIAGNOSIS — M25.562 CHRONIC PAIN OF BOTH KNEES: ICD-10-CM

## 2021-10-22 DIAGNOSIS — M94.20 CHONDROMALACIA: Primary | ICD-10-CM

## 2021-10-22 PROCEDURE — 97110 THERAPEUTIC EXERCISES: CPT | Performed by: PHYSICAL THERAPIST

## 2021-10-22 NOTE — PROGRESS NOTES
Physical Therapy Daily Treatment and Progress Note      Patient: Meghan Lui   : 1990  Referring practitioner: Steve Valle MD  Date of Initial Visit: Type: THERAPY  Noted: 2021  Today's Date: 10/22/2021  Patient seen for 8 sessions           Subjective   Meghan Lui reports: stiffness in legs today.    Objective          Strength/Myotome Testing     Left Hip   Planes of Motion   Flexion: 4  Extension: 4  Abduction: 4    Right Hip   Planes of Motion   Flexion: 4  Extension: 4  Abduction: 4    Left Knee   Flexion: 4  Prone flexion: 4    Right Knee   Flexion: 4  Prone flexion: 4      See Exercise, Manual, and Modality Logs for complete treatment.       Assessment & Plan     Assessment  Impairments: abnormal or restricted ROM, activity intolerance, impaired physical strength, lacks appropriate home exercise program and pain with function  Assessment details: Pt presents with limitations, noted by evaluation that impede patient's ability to tolerate functional mobility/activity.  The skills of a therapist will be required to safely and effectively implement the following treatment plan to restore maximal level of function.    Prognosis: good  Prognosis details: Patient tolerated progression of strengthening today with increase in weight/resistance with exercises.  Functional Limitations: walking, uncomfortable because of pain, sitting, standing and unable to perform repetitive tasks  Goals  Plan Goals: 1. The patient complains of bilateral knee pain.   LTG 1: 8weeks:  The patient will report a pain rating of 2/10 or better in order to improve tolerance to activities of daily living and improve sleep quality.   STATUS:  ongoing  STG 1a: 4 weeks:  The patient will report a pain rating of 4/10 or better.   STATUS:  Met      2. The patient demonstrates weakness of the bilateral hip.   LTG 2: 8weeks:  The patient will demonstrate 5/5 strength for bilateral hip flexion, abduction, and extension in  order to improve hip stability and 5/5 bilateral knee flex/extend.  STATUS:  ongoing  STG 2a: 4weeks:  The patient will demonstrate 4+/5 strength for bilateral hip flexion, abduction, and extension.  STATUS:  progressing     3. Mobility: Walking/Moving Around Functional Limitation     LTG 3:8 weeks:  The patient will demonstrate 12% limitation by achieving a score of 70/80 on the Lower Extremity Functional Scale.   STATUS:  ongoing  STG 3a: 4 weeks:  The patient will demonstrate 25% limitation by achieving a score of 60/80 on the Lower Extremity Functional Scale.     STATUS:  progressing    TREATMENT:  Therapeutic exercises, manual therapy, aquatic therapy, home exercise instruction, and modalities as needed for pain to include:  electrical stimulation, moist heat, ice, and  ultrasound      Plan  Therapy options: will be seen for skilled physical therapy services  Planned modality interventions: TENS, thermotherapy (hydrocollator packs) and cryotherapy  Planned therapy interventions: abdominal trunk stabilization, manual therapy, strengthening, stretching, functional ROM exercises, flexibility, home exercise program and soft tissue mobilization  Frequency: 2x week  Duration in weeks: 4  Treatment plan discussed with: patient        Visit Diagnoses:    ICD-10-CM ICD-9-CM   1. Chondromalacia  M94.20 733.92   2. Chronic pain of both knees  M25.561 719.46    M25.562 338.29    G89.29        Progress strengthening /stabilization /functional activity           Timed:  Manual Therapy:         mins  10084;  Therapeutic Exercise:    29     mins  71566;     Neuromuscular Nuris:        mins  17222;    Therapeutic Activity:          mins  69062;     Gait Training:           mins  69103;     Ultrasound:          mins  62906;    Electrical Stimulation:         mins  08460 ( );    Untimed:  Electrical Stimulation:         mins  41562 ( );  Mechanical Traction:         mins  31741;     Timed Treatment:   29   mins    Total Treatment:     29   mins  Naima Fontanez PT    Electronically singed 10/22/2021      [place] PT license: [id number]  Physical Therapist

## 2021-10-26 ENCOUNTER — TREATMENT (OUTPATIENT)
Dept: PHYSICAL THERAPY | Facility: CLINIC | Age: 31
End: 2021-10-26

## 2021-10-26 DIAGNOSIS — M25.562 CHRONIC PAIN OF BOTH KNEES: ICD-10-CM

## 2021-10-26 DIAGNOSIS — M25.561 CHRONIC PAIN OF BOTH KNEES: ICD-10-CM

## 2021-10-26 DIAGNOSIS — M94.20 CHONDROMALACIA: Primary | ICD-10-CM

## 2021-10-26 DIAGNOSIS — G89.29 CHRONIC PAIN OF BOTH KNEES: ICD-10-CM

## 2021-10-26 PROCEDURE — 97112 NEUROMUSCULAR REEDUCATION: CPT | Performed by: PHYSICAL THERAPIST

## 2021-10-26 PROCEDURE — 97110 THERAPEUTIC EXERCISES: CPT | Performed by: PHYSICAL THERAPIST

## 2021-10-26 NOTE — PROGRESS NOTES
Physical Therapy Daily Progress Note        Patient: Meghan Lui   : 1990  Diagnosis/ICD-10 Code:  Chondromalacia [M94.20]  Referring practitioner: Steve Valle MD  Date of Initial Visit: Type: THERAPY  Noted: 2021  Today's Date: 10/26/2021  Patient seen for 9 sessions             Subjective   Meghan Lui reports: knees feeling pretty good, not overly sore even with the change of weather.     Objective   Increased popping in R knee with leg press.     See Exercise, Manual, and Modality Logs for complete treatment.       Assessment/Plan  Meghan progressing as evident by decreased overall knee pain. Pt tolerated exercises well, no complaints of increased pain or discomfort. Pt would benefit from skilled PT to address Range of Motion  and Strength deficits, pain management and any concerns with ADLs.       Progress per Plan of Care           Timed:  Manual Therapy:         mins  39704;  Therapeutic Exercise:    20     mins  04519;     Neuromuscular Nuris:   10     mins  07181;    Therapeutic Activity:          mins  21629;     Gait Training:           mins  90034;    Aquatic Therapy:          mins  23648;       Untimed:  Electrical Stimulation:         mins  32057 ( );  Mechanical Traction:         mins  89795;       Timed Treatment:   30   mins   Total Treatment:     30   mins      Electronically signed:   Tori Sutherland PTA  Physical Therapist Assistant  [facility] License #: [id number]

## 2021-10-28 ENCOUNTER — TREATMENT (OUTPATIENT)
Dept: PHYSICAL THERAPY | Facility: CLINIC | Age: 31
End: 2021-10-28

## 2021-10-28 DIAGNOSIS — M94.20 CHONDROMALACIA: Primary | ICD-10-CM

## 2021-10-28 DIAGNOSIS — M25.561 CHRONIC PAIN OF BOTH KNEES: ICD-10-CM

## 2021-10-28 DIAGNOSIS — G89.29 CHRONIC PAIN OF BOTH KNEES: ICD-10-CM

## 2021-10-28 DIAGNOSIS — M25.562 CHRONIC PAIN OF BOTH KNEES: ICD-10-CM

## 2021-10-28 PROCEDURE — 97110 THERAPEUTIC EXERCISES: CPT | Performed by: PHYSICAL THERAPIST

## 2021-10-28 NOTE — PROGRESS NOTES
Physical Therapy Daily Treatment Note      Patient: Meghan Lui   : 1990  Referring practitioner: Steve Valle MD  Date of Initial Visit: Type: THERAPY  Noted: 2021  Today's Date: 10/28/2021  Patient seen for 10 sessions           Subjective   Meghan Lui reports: bilateral knee stiffness/restless feeling.    Objective   See Exercise, Manual, and Modality Logs for complete treatment.       Assessment & Plan     Assessment  Prognosis details: Patient tolerated session without pain and has follow up with referring provider next week.        Visit Diagnoses:    ICD-10-CM ICD-9-CM   1. Chondromalacia  M94.20 733.92   2. Chronic pain of both knees  M25.561 719.46    M25.562 338.29    G89.29        Awaiting MD orders           Timed:  Manual Therapy:         mins  34139;  Therapeutic Exercise:    28     mins  13362;     Neuromuscular Nuris:        mins  40820;    Therapeutic Activity:          mins  70807;     Gait Training:           mins  37988;     Ultrasound:          mins  32333;    Electrical Stimulation:         mins  26216 ( );    Untimed:  Electrical Stimulation:         mins  89519 ( );  Mechanical Traction:         mins  47400;     Timed Treatment:   28   mins   Total Treatment:     28   mins  Naima Fontanez PT    Electronically singed 10/28/2021      [place] PT license: [id number]  Physical Therapist

## 2021-11-01 ENCOUNTER — OFFICE VISIT (OUTPATIENT)
Dept: ORTHOPEDIC SURGERY | Facility: CLINIC | Age: 31
End: 2021-11-01

## 2021-11-01 VITALS — BODY MASS INDEX: 39.68 KG/M2 | HEART RATE: 78 BPM | WEIGHT: 293 LBS | OXYGEN SATURATION: 96 % | HEIGHT: 72 IN

## 2021-11-01 DIAGNOSIS — M94.20 CHONDROMALACIA: Primary | ICD-10-CM

## 2021-11-01 PROCEDURE — 99213 OFFICE O/P EST LOW 20 MIN: CPT | Performed by: PHYSICIAN ASSISTANT

## 2021-11-01 NOTE — PROGRESS NOTES
"Chief Complaint  Follow-up of the Left Knee and Follow-up of the Right Knee    Subjective          Meghan Lui is a 31 y.o. female  presents to [facility] for   History of Present Illness    Patient presents for follow-up evaluation of bilateral knee pain, bilateral knee chondromalacia.  Patient was last seen by Dr. Valle on 8/10/2021.  Patient states that when she started physical therapy she has been attending twice a week and states that she has had great results with therapy.  She states that this is the first time in a year that she has had no swelling or pain in her knees.  She states that therapy has helped her strength in her knees and hips she states she has good use of her knees with activities she has young children and she is able to be more active with them.  Patient takes diclofenac prescribed by her primary care physician.  She denies new pain, denies new injury states she has improved and would like to continue physical therapy.  No new complaints today.  Allergies   Allergen Reactions   • Buspirone Mental Status Change        Social History     Socioeconomic History   • Marital status: Single   Tobacco Use   • Smoking status: Former Smoker   • Smokeless tobacco: Never Used   Vaping Use   • Vaping Use: Never used   Substance and Sexual Activity   • Alcohol use: Yes     Comment: CURRENT SOME DAY         REVIEW OF SYSTEMS    Constitutional: Denies fevers, chills, weight loss  Cardiovascular: Denies chest pain, shortness of breath  Skin: Denies rashes, acute skin changes  Neurologic: Denies headache, loss of consciousness  MSK: Bilateral knees pain      Objective   Vital Signs:   Pulse 78   Ht 185.4 cm (73\")   Wt (!) 139 kg (306 lb 9.6 oz)   SpO2 96%   BMI 40.45 kg/m²     Body mass index is 40.45 kg/m².    Physical Exam    Right knee: Crepitus with range of motion, full extension, flexion 120, stable to varus/valgus stress, stable anterior/posterior drawer.  " No pain with range of motion, nontender to palpation.    Left knee: Crepitus with range of motion, full extension, flexion 120, stable to varus/valgus stress, stable to anterior/posterior drawer, nontender to palpation, no pain with range of motion.    Procedures    Imaging Results (Most Recent)     None           Result Review :   The following data was reviewed by: RENETTA Penny on 11/01/2021:               Assessment and Plan    Diagnoses and all orders for this visit:    1. Chondromalacia, bilateral knee (Primary)  -     Ambulatory Referral to Physical Therapy Evaluate and treat (2-3x/week for 6-8 weeks)        Discussed diagnosis and treatment options with the patient, patient was advised we recommend continued physical therapy and new order was written, continue diclofenac as prescribed from primary care physician.  Follow-up in 8 weeks for reevaluation.  Follow-up sooner if any new or concerning symptoms occur, patient agreed.  Call or return if worsening symptoms.    Follow Up   Return in about 8 weeks (around 12/27/2021).  Patient was given instructions and counseling regarding her condition or for health maintenance advice. Please see specific information pulled into the AVS if appropriate.

## 2021-12-07 ENCOUNTER — OFFICE VISIT (OUTPATIENT)
Dept: OTOLARYNGOLOGY | Facility: CLINIC | Age: 31
End: 2021-12-07

## 2021-12-07 VITALS — WEIGHT: 293 LBS | TEMPERATURE: 97.2 F | BODY MASS INDEX: 39.68 KG/M2 | HEIGHT: 72 IN

## 2021-12-07 DIAGNOSIS — J35.8 TONSIL STONE: ICD-10-CM

## 2021-12-07 DIAGNOSIS — J03.91 RECURRENT TONSILLITIS: Primary | ICD-10-CM

## 2021-12-07 PROCEDURE — 99213 OFFICE O/P EST LOW 20 MIN: CPT | Performed by: NURSE PRACTITIONER

## 2021-12-07 RX ORDER — FOLIC ACID 1 MG/1
TABLET ORAL
COMMUNITY
Start: 2021-10-04 | End: 2022-12-22

## 2021-12-07 RX ORDER — MIRTAZAPINE 30 MG/1
TABLET, FILM COATED ORAL
COMMUNITY
Start: 2021-11-01 | End: 2022-12-22

## 2021-12-07 RX ORDER — ZOLPIDEM TARTRATE 10 MG/1
TABLET ORAL
COMMUNITY
Start: 2021-11-29 | End: 2022-12-22

## 2021-12-07 RX ORDER — PHENTERMINE HYDROCHLORIDE 37.5 MG/1
37.5 TABLET ORAL DAILY
COMMUNITY
Start: 2021-09-20 | End: 2022-12-22

## 2021-12-07 RX ORDER — TRAZODONE HYDROCHLORIDE 50 MG/1
TABLET ORAL
COMMUNITY
Start: 2021-10-13 | End: 2022-12-22

## 2021-12-07 RX ORDER — HYDROXYZINE PAMOATE 25 MG/1
CAPSULE ORAL
COMMUNITY
Start: 2021-10-27 | End: 2022-12-22

## 2021-12-07 RX ORDER — LISINOPRIL 5 MG/1
TABLET ORAL
COMMUNITY
Start: 2021-10-18 | End: 2022-12-22 | Stop reason: SDUPTHER

## 2021-12-07 NOTE — PROGRESS NOTES
Patient Name: Meghan Lui   Visit Date: 12/07/2021   Patient ID: [id number]  Provider: DARINEL Edwards    Sex: female  Location: [facility]   YOB: 1990  Location Address: [address] [place] [address], [address],?[place]?[address]    Primary Care Provider Dawna Song APRN  Location Phone: [phone]    Referring Provider: DARINEL Delarosa        Chief Complaint  Sore Throat and Other (Tonsil stones)    Subjective   Meghan Lui is a 31 y.o. female who presents to [facility] today as a consult from DARINEL Delarosa for evaluation of her tonsils.  Patient states that since she was a child she has had frequent strep and tonsillitis infections.  She states that as a child she was supposed to have her tonsils removed but reports that her mom canceled this and never rescheduled it.  She states she gets approximately 3-4 tonsillitis and/or strep infections per year.  She states over the past year she has had almost daily tonsil stones.  She states she will cough these up almost each morning.  She states that her throat is frequently sore and she often feels like she has a hard time swallowing.  Over the past 12 months she has had 2 episodes of tonsillitis.  She reports that she is scheduled for a sleep study in February because she has had a difficult time sleeping over the past 6 months.  She states that she often wakes up short of breath and has been told by her  that she does snore.      Current Outpatient Medications on File Prior to Visit   Medication Sig   • albuterol sulfate  (90 Base) MCG/ACT inhaler albuterol sulfate HFA 90 mcg/actuation aerosol inhaler   • Cholecalciferol 125 MCG (5000 UT) tablet Vitamin D3 5,000 unit oral tablet take 1 tablet by oral route daily   Suspended   • diclofenac (VOLTAREN) 50 MG EC tablet diclofenac sodium 50 mg tablet,delayed release   TAKE 1 TABLET BY MOUTH TWICE A  "DAY WITH FOOD OR MILK   • folic acid (FOLVITE) 1 MG tablet    • gabapentin (NEURONTIN) 800 MG tablet 800 mg 2 (Two) Times a Day.   • HYDROcodone-acetaminophen (NORCO) 7.5-325 MG per tablet hydrocodone 7.5 mg-acetaminophen 325 mg tablet   take a 1/2tab - 1whole tab Q24H PRN      30day       #10   • hydrOXYzine pamoate (VISTARIL) 25 MG capsule    • lisinopril (PRINIVIL,ZESTRIL) 5 MG tablet    • prazosin (MINIPRESS) 1 MG capsule Take 1 mg by mouth 1 (One) Time Per Week.   • Synthroid 125 MCG tablet Take 137 mcg by mouth Daily.   • zolpidem (AMBIEN) 10 MG tablet    • buPROPion SR (WELLBUTRIN SR) 150 MG 12 hr tablet Take 150 mg by mouth 2 (Two) Times a Day.   • cyclobenzaprine (FLEXERIL) 10 MG tablet cyclobenzaprine 10 mg tablet   TAKE 1 TABLET BY MOUTH TWICE A DAY AS NEEDED   • hydrOXYzine (ATARAX) 25 MG tablet hydroxyzine HCl 25 mg oral tablet take 1 tablet (25 mg) by oral route 3 times per day   Active   • ibuprofen (ADVIL,MOTRIN) 800 MG tablet Take  by mouth See Admin Instructions. Take 1 tablet by mouth every 6-8 hours as needed for pain   • mirtazapine (REMERON SOL-TAB) 30 MG disintegrating tablet    • phentermine (ADIPEX-P) 37.5 MG tablet Take 37.5 mg by mouth Daily.   • traZODone (DESYREL) 50 MG tablet      No current facility-administered medications on file prior to visit.        Social History     Tobacco Use   • Smoking status: Former Smoker     Packs/day: 0.50     Types: Cigarettes     Quit date:      Years since quittin.9   • Smokeless tobacco: Never Used   Vaping Use   • Vaping Use: Every day   • Substances: Nicotine   • Devices: Disposable   Substance Use Topics   • Alcohol use: Yes     Comment: CURRENT SOME DAY    • Drug use: Not on file       Objective     Vital Signs:   Temp 97.2 °F (36.2 °C) (Temporal)   Ht 185.4 cm (73\")   Wt (!) 141 kg (311 lb)   BMI 41.03 kg/m²       Physical Exam  Constitutional:       General: She is not in acute distress.     Appearance: Normal appearance. She is not " ill-appearing.   HENT:      Head: Normocephalic and atraumatic.      Jaw: There is normal jaw occlusion. No tenderness or pain on movement.      Salivary Glands: Right salivary gland is not diffusely enlarged or tender. Left salivary gland is not diffusely enlarged or tender.      Right Ear: Tympanic membrane, ear canal and external ear normal.      Left Ear: Tympanic membrane, ear canal and external ear normal.      Nose: Nose normal. No septal deviation.      Right Sinus: No maxillary sinus tenderness or frontal sinus tenderness.      Left Sinus: No maxillary sinus tenderness or frontal sinus tenderness.      Mouth/Throat:      Lips: Pink. No lesions.      Mouth: Mucous membranes are moist. No oral lesions.      Dentition: Normal dentition.      Tongue: No lesions.      Palate: No mass and lesions.      Pharynx: Oropharynx is clear. Uvula midline.      Tonsils: No tonsillar exudate. 2+ on the right. 2+ on the left.      Comments: Left tonsil stone  Eyes:      Extraocular Movements: Extraocular movements intact.      Conjunctiva/sclera: Conjunctivae normal.      Pupils: Pupils are equal, round, and reactive to light.   Neck:      Thyroid: No thyroid mass, thyromegaly or thyroid tenderness.      Trachea: Trachea normal.   Pulmonary:      Effort: Pulmonary effort is normal. No respiratory distress.   Musculoskeletal:         General: Normal range of motion.      Cervical back: Normal range of motion and neck supple. No tenderness.   Lymphadenopathy:      Cervical: No cervical adenopathy.   Skin:     General: Skin is warm and dry.   Neurological:      General: No focal deficit present.      Mental Status: She is alert and oriented to person, place, and time.      Cranial Nerves: No cranial nerve deficit.      Motor: No weakness.      Gait: Gait normal.   Psychiatric:         Mood and Affect: Mood normal.         Behavior: Behavior normal.         Thought Content: Thought content normal.         Judgment: Judgment  normal.               Result Review :              Assessment and Plan    Diagnoses and all orders for this visit:    1. Recurrent tonsillitis (Primary)    2. Tonsil stone    Based on her number of recurrent tonsillitis infections and frequent tonsil stones she would meet criteria and benefit from tonsillectomy and possible adenoidectomy.  We have discussed however that this may not help with her sleep issues she has been having lately.  Risk and benefits as well as possible complications and alternatives were discussed with the patient.  She also met with Dr. Shelton who examined her.  She states that at this time she would like to think further about having a tonsillectomy and may consider getting her sleep study first before making this decision.  She will call us if she would like to schedule.    I have discussed my findings with Dr Shelton, who has personally examined the patient and agrees with my assessment and plan.   Follow Up   No follow-ups on file.  Patient was given instructions and counseling regarding her condition or for health maintenance advice. Please see specific information pulled into the AVS if appropriate.      DARINEL Edwards

## 2022-01-17 ENCOUNTER — DOCUMENTATION (OUTPATIENT)
Dept: PHYSICAL THERAPY | Facility: CLINIC | Age: 32
End: 2022-01-17

## 2022-12-22 ENCOUNTER — LAB (OUTPATIENT)
Dept: LAB | Facility: HOSPITAL | Age: 32
End: 2022-12-22

## 2022-12-22 ENCOUNTER — OFFICE VISIT (OUTPATIENT)
Dept: FAMILY MEDICINE CLINIC | Facility: CLINIC | Age: 32
End: 2022-12-22

## 2022-12-22 VITALS
RESPIRATION RATE: 19 BRPM | OXYGEN SATURATION: 99 % | TEMPERATURE: 98 F | HEIGHT: 72 IN | BODY MASS INDEX: 39.68 KG/M2 | SYSTOLIC BLOOD PRESSURE: 110 MMHG | HEART RATE: 80 BPM | WEIGHT: 293 LBS | DIASTOLIC BLOOD PRESSURE: 72 MMHG

## 2022-12-22 DIAGNOSIS — M51.36 DDD (DEGENERATIVE DISC DISEASE), LUMBAR: ICD-10-CM

## 2022-12-22 DIAGNOSIS — G62.9 NEUROPATHY: ICD-10-CM

## 2022-12-22 DIAGNOSIS — Z13.220 SCREENING FOR LIPID DISORDERS: ICD-10-CM

## 2022-12-22 DIAGNOSIS — G43.719 INTRACTABLE CHRONIC MIGRAINE WITHOUT AURA AND WITHOUT STATUS MIGRAINOSUS: ICD-10-CM

## 2022-12-22 DIAGNOSIS — F51.01 PRIMARY INSOMNIA: ICD-10-CM

## 2022-12-22 DIAGNOSIS — Z76.89 ESTABLISHING CARE WITH NEW DOCTOR, ENCOUNTER FOR: Primary | ICD-10-CM

## 2022-12-22 DIAGNOSIS — E06.3 HASHIMOTO'S THYROIDITIS: ICD-10-CM

## 2022-12-22 DIAGNOSIS — Z01.89 ROUTINE LAB DRAW: ICD-10-CM

## 2022-12-22 DIAGNOSIS — F41.9 ANXIETY: ICD-10-CM

## 2022-12-22 DIAGNOSIS — N92.6 IRREGULAR MENSES: ICD-10-CM

## 2022-12-22 DIAGNOSIS — K21.9 GASTROESOPHAGEAL REFLUX DISEASE WITHOUT ESOPHAGITIS: ICD-10-CM

## 2022-12-22 DIAGNOSIS — F33.41 RECURRENT MAJOR DEPRESSIVE DISORDER, IN PARTIAL REMISSION: ICD-10-CM

## 2022-12-22 DIAGNOSIS — L68.9 EXCESS BODY AND FACIAL HAIR: ICD-10-CM

## 2022-12-22 DIAGNOSIS — R53.83 FATIGUE, UNSPECIFIED TYPE: ICD-10-CM

## 2022-12-22 DIAGNOSIS — J35.8 TONSIL STONE: ICD-10-CM

## 2022-12-22 DIAGNOSIS — I10 PRIMARY HYPERTENSION: ICD-10-CM

## 2022-12-22 DIAGNOSIS — E66.01 CLASS 3 SEVERE OBESITY DUE TO EXCESS CALORIES WITH SERIOUS COMORBIDITY AND BODY MASS INDEX (BMI) OF 40.0 TO 44.9 IN ADULT: ICD-10-CM

## 2022-12-22 PROBLEM — E66.813 CLASS 3 SEVERE OBESITY DUE TO EXCESS CALORIES WITH SERIOUS COMORBIDITY AND BODY MASS INDEX (BMI) OF 40.0 TO 44.9 IN ADULT: Status: ACTIVE | Noted: 2022-12-22

## 2022-12-22 PROBLEM — M51.369 DDD (DEGENERATIVE DISC DISEASE), LUMBAR: Status: ACTIVE | Noted: 2022-12-22

## 2022-12-22 LAB
BASOPHILS # BLD AUTO: 0.08 10*3/MM3 (ref 0–0.2)
BASOPHILS NFR BLD AUTO: 1.4 % (ref 0–1.5)
DEPRECATED RDW RBC AUTO: 43.2 FL (ref 37–54)
EOSINOPHIL # BLD AUTO: 0.06 10*3/MM3 (ref 0–0.4)
EOSINOPHIL NFR BLD AUTO: 1 % (ref 0.3–6.2)
ERYTHROCYTE [DISTWIDTH] IN BLOOD BY AUTOMATED COUNT: 13.9 % (ref 12.3–15.4)
HBA1C MFR BLD: 5.3 % (ref 4.8–5.6)
HCT VFR BLD AUTO: 36.5 % (ref 34–46.6)
HGB BLD-MCNC: 11.9 G/DL (ref 12–15.9)
IMM GRANULOCYTES # BLD AUTO: 0.02 10*3/MM3 (ref 0–0.05)
IMM GRANULOCYTES NFR BLD AUTO: 0.3 % (ref 0–0.5)
LYMPHOCYTES # BLD AUTO: 1.55 10*3/MM3 (ref 0.7–3.1)
LYMPHOCYTES NFR BLD AUTO: 26.8 % (ref 19.6–45.3)
MCH RBC QN AUTO: 27.5 PG (ref 26.6–33)
MCHC RBC AUTO-ENTMCNC: 32.6 G/DL (ref 31.5–35.7)
MCV RBC AUTO: 84.3 FL (ref 79–97)
MONOCYTES # BLD AUTO: 0.33 10*3/MM3 (ref 0.1–0.9)
MONOCYTES NFR BLD AUTO: 5.7 % (ref 5–12)
NEUTROPHILS NFR BLD AUTO: 3.75 10*3/MM3 (ref 1.7–7)
NEUTROPHILS NFR BLD AUTO: 64.8 % (ref 42.7–76)
NRBC BLD AUTO-RTO: 0 /100 WBC (ref 0–0.2)
PLATELET # BLD AUTO: 250 10*3/MM3 (ref 140–450)
PMV BLD AUTO: 11 FL (ref 6–12)
RBC # BLD AUTO: 4.33 10*6/MM3 (ref 3.77–5.28)
WBC NRBC COR # BLD: 5.79 10*3/MM3 (ref 3.4–10.8)

## 2022-12-22 PROCEDURE — 80053 COMPREHEN METABOLIC PANEL: CPT

## 2022-12-22 PROCEDURE — 82627 DEHYDROEPIANDROSTERONE: CPT

## 2022-12-22 PROCEDURE — 85025 COMPLETE CBC W/AUTO DIFF WBC: CPT

## 2022-12-22 PROCEDURE — 82746 ASSAY OF FOLIC ACID SERUM: CPT

## 2022-12-22 PROCEDURE — 84402 ASSAY OF FREE TESTOSTERONE: CPT

## 2022-12-22 PROCEDURE — 83525 ASSAY OF INSULIN: CPT

## 2022-12-22 PROCEDURE — 84146 ASSAY OF PROLACTIN: CPT

## 2022-12-22 PROCEDURE — 83001 ASSAY OF GONADOTROPIN (FSH): CPT

## 2022-12-22 PROCEDURE — 83002 ASSAY OF GONADOTROPIN (LH): CPT

## 2022-12-22 PROCEDURE — 81003 URINALYSIS AUTO W/O SCOPE: CPT

## 2022-12-22 PROCEDURE — 99204 OFFICE O/P NEW MOD 45 MIN: CPT

## 2022-12-22 PROCEDURE — 83036 HEMOGLOBIN GLYCOSYLATED A1C: CPT

## 2022-12-22 PROCEDURE — 84443 ASSAY THYROID STIM HORMONE: CPT

## 2022-12-22 PROCEDURE — 36415 COLL VENOUS BLD VENIPUNCTURE: CPT

## 2022-12-22 PROCEDURE — 80061 LIPID PANEL: CPT

## 2022-12-22 PROCEDURE — 82607 VITAMIN B-12: CPT

## 2022-12-22 RX ORDER — LAMOTRIGINE 100 MG/1
TABLET ORAL
COMMUNITY
Start: 2022-12-12 | End: 2022-12-22 | Stop reason: SDUPTHER

## 2022-12-22 RX ORDER — LEVOTHYROXINE SODIUM 137 MCG
TABLET ORAL
COMMUNITY
Start: 2022-10-12 | End: 2023-03-22 | Stop reason: SDUPTHER

## 2022-12-22 RX ORDER — OMEPRAZOLE 40 MG/1
40 CAPSULE, DELAYED RELEASE ORAL DAILY
Qty: 90 CAPSULE | Refills: 1 | Status: SHIPPED | OUTPATIENT
Start: 2022-12-22

## 2022-12-22 RX ORDER — HYDROCODONE BITARTRATE AND ACETAMINOPHEN 7.5; 325 MG/1; MG/1
TABLET ORAL
COMMUNITY

## 2022-12-22 RX ORDER — LISINOPRIL 5 MG/1
5 TABLET ORAL DAILY
Qty: 90 TABLET | Refills: 1 | Status: SHIPPED | OUTPATIENT
Start: 2022-12-22

## 2022-12-22 RX ORDER — CLINDAMYCIN PHOSPHATE 11.9 MG/ML
SOLUTION TOPICAL
COMMUNITY

## 2022-12-22 RX ORDER — SEMAGLUTIDE 1.34 MG/ML
INJECTION, SOLUTION SUBCUTANEOUS
COMMUNITY
Start: 2022-12-05 | End: 2022-12-22

## 2022-12-22 RX ORDER — CHLORHEXIDINE GLUCONATE 4 G/100ML
SOLUTION TOPICAL
COMMUNITY

## 2022-12-22 RX ORDER — AMITRIPTYLINE HYDROCHLORIDE 25 MG/1
25 TABLET, FILM COATED ORAL NIGHTLY PRN
Qty: 90 TABLET | Refills: 1 | Status: SHIPPED | OUTPATIENT
Start: 2022-12-22 | End: 2023-01-26

## 2022-12-22 RX ORDER — LAMOTRIGINE 100 MG/1
100 TABLET ORAL DAILY
Qty: 90 TABLET | Refills: 1 | Status: SHIPPED | OUTPATIENT
Start: 2022-12-22 | End: 2023-02-08 | Stop reason: SDUPTHER

## 2022-12-22 NOTE — ASSESSMENT & PLAN NOTE
Patient already sees ENT in regards to this, to get back in with them and see if tonsillectomy is an option.

## 2022-12-22 NOTE — ASSESSMENT & PLAN NOTE
We will start amitriptyline, should give some benefit for not only sleep, but some migraines, and even some of her chronic pain as well.  Discussed that we will start at 25 mg, she can take this as needed at night to help with sleep.  May need to adjust dose, to let me know if any side effects.

## 2022-12-22 NOTE — ASSESSMENT & PLAN NOTE
Likely cause of patient symptoms for her stomach issues, will start on omeprazole.  Discussed with patient also start a probiotic.  Can consider further work-up if no resolution with omeprazole, consider EGD as well.

## 2022-12-22 NOTE — ASSESSMENT & PLAN NOTE
We will obtain blood work prior to any initiation of treatment, will check an insulin level to see if there is some insulin resistance, will also check her for PCOS as she does have a regular menstrual cycle, facial health current, and some of the other signs that could indicate this.  If does have insulin resistance, will start on metformin.  Patient does not qualify for Ozempic as it is only indicated for diabetes, patient does not have a diagnosis of diabetes.  But will check and hemoglobin A1c.

## 2022-12-22 NOTE — PROGRESS NOTES
Meghan Lui presents to [facility] with complaints of issues with insomnia, stomach issues, weight issues, and is also here to establish as a new patient.      History of Present Illness  This is a 32-year-old female, past medical history significant for degenerative disc disease, neuropathy, insomnia, anxiety, depression, cholecystectomy, Hashimoto's thyroiditis, hypertension,, migraines who presents to the clinic today with complaints of issues with insomnia, stomach issues, and weight issues.    Degenerative disc disease/neuropathy: States that she has had issues with this for a long time, states that she does currently follow-up with pain management, they do have her on a decent regimen where she seems to have pretty well control over it.  Patient states that she does take gabapentin daily, has come down on the dose, was taking it 3 times a day and is currently only taking it twice.  Notices that when she takes any higher dose, she does have a lot of brain fog associated with that and did not like how that made her feel.  Does also have hydrocodone, only gets about 10 a month, states that she will only take those in severe instances and does not try to take routinely.  States that this is relatively controlled, but knows if she was able to lose some weight, it would help with her back pain as well.    Insomnia/anxiety/depression: Patient states that for anxiety and depression, she is tried several medications, Remeron and BuSpar caused her to have some pretty severe mental status change, and cannot take these medications.  Was started on Lamictal by her previous provider, has noticed a tremendous difference with this medication, thought that it would help some with her insomnia, but has not seen a lot of relief out of this.  Has tried different medications for insomnia, most recently hydroxyzine, over-the-counter medications, and has not got relief.  Has tried Ambien the  past, states that the best sleep that she had, knows that that medication is not good for her long-term.  Is wondering if there are any other options available to help with her sleep, as she is continuously fatigued during the day and she thinks that is related to her not sleeping well at night.    Cholecystectomy/stomach issues: States that she had her gallbladder removed back several years ago, was never told that it would cause longstanding GI issues, but she is definitely had some since having it removed.  Does have diarrhea, states that almost anytime she eats certain things, she will immediately have to go to the bathroom either in the middle of the meal or right after.  Also has a pretty intense gas, has some pain right in the middle of her abdominal epigastric area, and has some bloating associated with meals and staff as well.  Has never taken any medication for this, has not had this worked up, but states that it is measurable and is something she would like to address.  Denies any heartburn, denies any fever/chills/body aches.  Denies any change in stool.    Hashimoto's thyroiditis: Has been seen by endocrinology for some time, they do have her thyroid relatively stable.  She is on brand-name Synthroid at 137, states that he told her that if her primary care would take this over, he would be okay with this.  No other acute issues, knows that this does contribute to her weight.    Hypertension/migraines: States that she mainly takes this because she has a history of pretty severe migraines, and this helps keep them at bay.  States that anytime she gets a migraine, her blood pressure will increase significantly, make the migraine much much worse.  Has never taken anything preventative for migraines, but states that she is not had one in a while due to her blood pressure being well controlled on the low-dose lisinopril.  Blood pressure today is 110/72.  Denies chest pain or shortness of breath.    Obesity:  States that she is tried several things in the past to help her lose weight, states that she is tried all the fad diets, has never really made much progress, has tried phentermine by previous provider, even her previous provider documented in her chart that she had diabetes so that way she can get the injectable medications to try those.  Patient does not have diabetes.  States that she tried phentermine, did not like the way that made her feel, and just want something to help with the weight.  States that she is tried everything, is unable to really lose any weight.  Has made diet changes, has increased exercise as able, but has not made any progress.    Will discuss other preventative screenings at next visit.    Past Medical History:   Diagnosis Date   • Anxiety    • Arthritis    • Bladder problem    • Cholelithiasis     Removed 2011   • Condition not found     HERNIATED DISC (UNSPECIFIED)    • Depression    • Foot pain, left 2019   • Heel pain    • HL (hearing loss)    • Hx of blood diseases    • Hx of degenerative disc disease    • Hypothyroidism    • Ingrown toenail    • Leg pain    • Leg swelling    • Lumbago    • Migraines    • Mood disorder (HCC)    • Muscle cramps    • Numbness in feet    • Obesity    • Thyroid disease      Past Surgical History:   •  SECTION   • CHOLECYSTECTOMY   • DILATION AND CURETTAGE, DIAGNOSTIC / THERAPEUTIC   • GALLBLADDER SURGERY   • WISDOM TOOTH EXTRACTION       Social History     Socioeconomic History   • Marital status: Single   Tobacco Use   • Smoking status: Former     Packs/day: 0.50     Years: 16.00     Pack years: 8.00     Types: Cigarettes     Quit date: 2021     Years since quittin.9   • Smokeless tobacco: Never   Vaping Use   • Vaping Use: Every day   • Substances: Nicotine   • Devices: Disposable   Substance and Sexual Activity   • Alcohol use: Not Currently     Comment: CURRENT SOME DAY    • Drug use: Never   • Sexual activity: Yes      "Partners: Male     Birth control/protection: None       Family History   Problem Relation Age of Onset   • Diabetes Mother    • Hypertension Mother    • Cancer Father    • Arthritis Sister    • Cancer Paternal Grandfather    • Arthritis Paternal Grandmother    • Cancer Paternal Grandmother          Objective   Vital Signs:   /72   Pulse 80   Temp 98 °F (36.7 °C)   Resp 19   Ht 185.4 cm (73\")   Wt (!) 138 kg (305 lb 3.2 oz)   SpO2 99%   BMI 40.27 kg/m²     Body mass index is 40.27 kg/m².    All labs, imaging, test results, and specialty provider notes reviewed with patient.       Physical Exam  Vitals reviewed.   Constitutional:       Appearance: Normal appearance.   Cardiovascular:      Rate and Rhythm: Normal rate and regular rhythm.      Pulses: Normal pulses.      Heart sounds: Normal heart sounds.   Pulmonary:      Effort: Pulmonary effort is normal.      Breath sounds: Normal breath sounds.   Neurological:      General: No focal deficit present.      Mental Status: She is alert and oriented to person, place, and time.                Assessment and Plan:  Diagnoses and all orders for this visit:    1. Establishing care with new doctor, encounter for (Primary)    2. Routine lab draw  -     CBC Auto Differential; Future  -     Comprehensive Metabolic Panel; Future  -     TSH Rfx On Abnormal To Free T4; Future  -     Urinalysis With Culture If Indicated -; Future    3. Screening for lipid disorders  -     Lipid Panel; Future    4. Fatigue, unspecified type  -     Vitamin B12 & Folate; Future    5. Irregular menses  -     DHEA-sulfate; Future  -     Follicle stimulating hormone; Future  -     Luteinizing hormone; Future  -     Prolactin; Future  -     Testosterone Free Direct; Future    6. Excess body and facial hair  -     DHEA-sulfate; Future  -     Follicle stimulating hormone; Future  -     Luteinizing hormone; Future  -     Prolactin; Future  -     Testosterone Free Direct; Future    7. Class 3 " severe obesity due to excess calories with serious comorbidity and body mass index (BMI) of 40.0 to 44.9 in adult ([facility])  Assessment & Plan:  We will obtain blood work prior to any initiation of treatment, will check an insulin level to see if there is some insulin resistance, will also check her for PCOS as she does have a regular menstrual cycle, facial health current, and some of the other signs that could indicate this.  If does have insulin resistance, will start on metformin.  Patient does not qualify for Ozempic as it is only indicated for diabetes, patient does not have a diagnosis of diabetes.  But will check and hemoglobin A1c.     Orders:  -     Hemoglobin A1c; Future  -     Insulin, Total; Future    8. Intractable chronic migraine without aura and without status migrainosus  Assessment & Plan:  Controlled.  Likely result of elevated blood pressure, so we will continue on lisinopril low-dose.  We will also start amitriptyline did not help not only with sleep, but will also help with prevention of migraines as well.        9. Gastroesophageal reflux disease without esophagitis  Assessment & Plan:  Likely cause of patient symptoms for her stomach issues, will start on omeprazole.  Discussed with patient also start a probiotic.  Can consider further work-up if no resolution with omeprazole, consider EGD as well.    Orders:  -     omeprazole (priLOSEC) 40 MG capsule; Take 1 capsule by mouth Daily.  Dispense: 90 capsule; Refill: 1    10. Primary insomnia  Assessment & Plan:  We will start amitriptyline, should give some benefit for not only sleep, but some migraines, and even some of her chronic pain as well.  Discussed that we will start at 25 mg, she can take this as needed at night to help with sleep.  May need to adjust dose, to let me know if any side effects.    Orders:  -     amitriptyline (ELAVIL) 25 MG tablet; Take 1 tablet by mouth At Night As Needed for Sleep.  Dispense: 90 tablet; Refill: 1    11.  DDD (degenerative disc disease), lumbar  Assessment & Plan:  Continue with follow-up via pain management, treatment per them.    Orders:  -     diclofenac (VOLTAREN) 50 MG EC tablet; Take 1 tablet by mouth 2 (Two) Times a Day.  Dispense: 60 tablet; Refill: 2    12. Neuropathy  Assessment & Plan:  Continue with follow-up via pain management, treatment per them.      13. Primary hypertension  Assessment & Plan:  Stable and helps prevent migraines.  We will continue lisinopril low-dose.  Patient is due for blood work, will order today.  Continue lifestyle modifications, low-salt diet.    Orders:  -     lisinopril (PRINIVIL,ZESTRIL) 5 MG tablet; Take 1 tablet by mouth Daily.  Dispense: 90 tablet; Refill: 1    14. Anxiety  -     lamoTRIgine (LaMICtal) 100 MG tablet; Take 1 tablet by mouth Daily.  Dispense: 90 tablet; Refill: 1    15. Recurrent major depressive disorder, in partial remission (HCC)  Assessment & Plan:  Currently stable, continue Lamictal at current dose.    Orders:  -     lamoTRIgine (LaMICtal) 100 MG tablet; Take 1 tablet by mouth Daily.  Dispense: 90 tablet; Refill: 1    16. Tonsil stone  Assessment & Plan:  Patient already sees ENT in regards to this, to get back in with them and see if tonsillectomy is an option.      17. Hashimoto's thyroiditis  Assessment & Plan:  We will repeat TSH, continue brand-name Synthroid at 137 mcg.  Goal TSH around 1.          Follow Up:  Return in about 3 months (around 3/22/2023).    Patient was given instructions and counseling regarding her condition or for health maintenance advice. Please see specific information pulled into the AVS if appropriate.

## 2022-12-22 NOTE — ASSESSMENT & PLAN NOTE
Controlled.  Likely result of elevated blood pressure, so we will continue on lisinopril low-dose.  We will also start amitriptyline did not help not only with sleep, but will also help with prevention of migraines as well.

## 2022-12-22 NOTE — ASSESSMENT & PLAN NOTE
Stable and helps prevent migraines.  We will continue lisinopril low-dose.  Patient is due for blood work, will order today.  Continue lifestyle modifications, low-salt diet.

## 2022-12-23 LAB
ALBUMIN SERPL-MCNC: 4.2 G/DL (ref 3.5–5.2)
ALBUMIN/GLOB SERPL: 1.8 G/DL
ALP SERPL-CCNC: 58 U/L (ref 39–117)
ALT SERPL W P-5'-P-CCNC: 10 U/L (ref 1–33)
ANION GAP SERPL CALCULATED.3IONS-SCNC: 9.3 MMOL/L (ref 5–15)
AST SERPL-CCNC: 9 U/L (ref 1–32)
BILIRUB SERPL-MCNC: 0.2 MG/DL (ref 0–1.2)
BILIRUB UR QL STRIP: NEGATIVE
BUN SERPL-MCNC: 11 MG/DL (ref 6–20)
BUN/CREAT SERPL: 10.6 (ref 7–25)
CALCIUM SPEC-SCNC: 9.1 MG/DL (ref 8.6–10.5)
CHLORIDE SERPL-SCNC: 104 MMOL/L (ref 98–107)
CHOLEST SERPL-MCNC: 170 MG/DL (ref 0–200)
CLARITY UR: CLEAR
CO2 SERPL-SCNC: 26.7 MMOL/L (ref 22–29)
COLOR UR: YELLOW
CREAT SERPL-MCNC: 1.04 MG/DL (ref 0.57–1)
EGFRCR SERPLBLD CKD-EPI 2021: 73.4 ML/MIN/1.73
FOLATE SERPL-MCNC: 2.55 NG/ML (ref 4.78–24.2)
FSH SERPL-ACNC: 6.05 MIU/ML
GLOBULIN UR ELPH-MCNC: 2.4 GM/DL
GLUCOSE SERPL-MCNC: 70 MG/DL (ref 65–99)
GLUCOSE UR STRIP-MCNC: NEGATIVE MG/DL
HDLC SERPL-MCNC: 40 MG/DL (ref 40–60)
HGB UR QL STRIP.AUTO: NEGATIVE
KETONES UR QL STRIP: NEGATIVE
LDLC SERPL CALC-MCNC: 114 MG/DL (ref 0–100)
LDLC/HDLC SERPL: 2.81 {RATIO}
LEUKOCYTE ESTERASE UR QL STRIP.AUTO: NEGATIVE
LH SERPL-ACNC: 5.73 MIU/ML
NITRITE UR QL STRIP: NEGATIVE
PH UR STRIP.AUTO: 7.5 [PH] (ref 5–8)
POTASSIUM SERPL-SCNC: 4.3 MMOL/L (ref 3.5–5.2)
PROLACTIN SERPL-MCNC: 12.2 NG/ML (ref 4.79–23.3)
PROT SERPL-MCNC: 6.6 G/DL (ref 6–8.5)
PROT UR QL STRIP: NEGATIVE
SODIUM SERPL-SCNC: 140 MMOL/L (ref 136–145)
SP GR UR STRIP: 1.02 (ref 1–1.03)
TRIGL SERPL-MCNC: 88 MG/DL (ref 0–150)
TSH SERPL DL<=0.05 MIU/L-ACNC: 1.32 UIU/ML (ref 0.27–4.2)
UROBILINOGEN UR QL STRIP: NORMAL
VIT B12 BLD-MCNC: 449 PG/ML (ref 211–946)
VLDLC SERPL-MCNC: 16 MG/DL (ref 5–40)

## 2022-12-24 LAB
DHEA-S SERPL-MCNC: 42.2 UG/DL (ref 84.8–378)
INSULIN SERPL-ACNC: 10.8 UIU/ML (ref 2.6–24.9)

## 2022-12-28 LAB — TESTOST FREE SERPL-MCNC: 0.3 PG/ML (ref 0–4.2)

## 2023-01-26 ENCOUNTER — OFFICE VISIT (OUTPATIENT)
Dept: FAMILY MEDICINE CLINIC | Facility: CLINIC | Age: 33
End: 2023-01-26
Payer: COMMERCIAL

## 2023-01-26 VITALS
DIASTOLIC BLOOD PRESSURE: 86 MMHG | TEMPERATURE: 98 F | SYSTOLIC BLOOD PRESSURE: 112 MMHG | BODY MASS INDEX: 39.68 KG/M2 | HEART RATE: 88 BPM | HEIGHT: 72 IN | WEIGHT: 293 LBS | OXYGEN SATURATION: 100 % | RESPIRATION RATE: 20 BRPM

## 2023-01-26 DIAGNOSIS — F51.01 PRIMARY INSOMNIA: ICD-10-CM

## 2023-01-26 DIAGNOSIS — K58.2 IRRITABLE BOWEL SYNDROME WITH BOTH CONSTIPATION AND DIARRHEA: Primary | ICD-10-CM

## 2023-01-26 DIAGNOSIS — G43.719 INTRACTABLE CHRONIC MIGRAINE WITHOUT AURA AND WITHOUT STATUS MIGRAINOSUS: ICD-10-CM

## 2023-01-26 PROCEDURE — 99214 OFFICE O/P EST MOD 30 MIN: CPT

## 2023-01-26 RX ORDER — HYDROXYZINE 50 MG/1
50-100 TABLET, FILM COATED ORAL NIGHTLY PRN
Qty: 180 TABLET | Refills: 1 | Status: SHIPPED | OUTPATIENT
Start: 2023-01-26

## 2023-01-26 RX ORDER — DICYCLOMINE HYDROCHLORIDE 10 MG/1
10 CAPSULE ORAL
Qty: 120 CAPSULE | Refills: 2 | Status: SHIPPED | OUTPATIENT
Start: 2023-01-26

## 2023-01-26 NOTE — ASSESSMENT & PLAN NOTE
Patient symptoms do sound like maybe they are more related to irritable bowel syndrome as they do occur every time after she eats, so we will treat with dicyclomine prior to meals.  If symptoms do not improve, worsen, can consider an ultrasound thereafter.

## 2023-01-26 NOTE — ASSESSMENT & PLAN NOTE
Did not tolerate amitriptyline, but patient was on the lowest dose of hydroxyzine, so we will increase this to 5200 mg nightly to see if this provides better relief.  Can consider trying Seroquel Do not think that benzodiazepines are the way to go to treat insomnia.

## 2023-01-26 NOTE — PROGRESS NOTES
Meghan Lui presents to [facility] with complaints of side effects of medication, persistent GI symptoms, and recent gas-leak exposure.      History of Present Illness  This is a 32-year-old female who presents to clinic with complaints of side effects of medication, persistent GI symptoms, and recent gas-like exposure.    Insomnia: Patient states that she tolerated the amitriptyline well with sleep the first week that she took it, states that she was able to fall asleep and stay asleep throughout the night, but states that that the next week, she seemed like her migraines got worse.  Patient states that the migraines to be pretty intense, she also noticed that she was having some pretty intense nightmares.  Patient does have history of PTSD, and states the nightmares have progressively worsened.  She states that she is even had worsening constipation and thinks it all is related to this medication because nothing else has changed.  Has tried other medications in regards to sleep, was on hydroxyzine, states that it worked initially, but then seemed like it almost just wore off.  States that she was on the 25 mg dose.  As far as her anxiety and depression, states that she is done really well on the Lamictal, but states that she would like some additional help with sleep if able.  Has tried Ambien in the past, but she knows that she does not to be on that medication long-term.  Has tried Seroquel in the past as well, but not from a sleep standpoint, has tried it from a mood stabilizer standpoint, but it did help with sleep as well.    GI symptoms: Patient was having a lot of issues with increased gas at last visit, states that anytime that she would eat anything did not matter what it was, she would have pretty intense bloating, cramping like pain, and gas that she could just not pass one way or another.  Patient states that the omeprazole has not given her any relief, and she  "is unsure of what else to do.  Patient states that it happens most of her time after she eats.  Did state that she was experiencing pretty frequent bowel movements, was going anywhere from 5-6 times daily, then when she started this medication to try to help with her sleep, states she did develop constipation at that time.  The patient states that her symptoms have persisted way before this.        The following portions of the patient's history were personally reviewed and updated as appropriate: allergies, current medications, past medical history, past surgical history, past family history, and past social history.       Objective   Vital Signs:   /86   Pulse 88   Temp 98 °F (36.7 °C)   Resp 20   Ht 185.4 cm (73\")   Wt (!) 138 kg (305 lb 1.6 oz)   SpO2 100%   BMI 40.25 kg/m²     Body mass index is 40.25 kg/m².    All labs, imaging, test results, and specialty provider notes reviewed with patient.     Physical Exam  Vitals reviewed.   Constitutional:       Appearance: Normal appearance.   Cardiovascular:      Rate and Rhythm: Normal rate and regular rhythm.      Pulses: Normal pulses.      Heart sounds: Normal heart sounds.   Pulmonary:      Effort: Pulmonary effort is normal.      Breath sounds: Normal breath sounds.   Neurological:      General: No focal deficit present.      Mental Status: She is alert and oriented to person, place, and time.            Assessment and Plan:  Diagnoses and all orders for this visit:    1. Irritable bowel syndrome with both constipation and diarrhea (Primary)  Assessment & Plan:  Patient symptoms do sound like maybe they are more related to irritable bowel syndrome as they do occur every time after she eats, so we will treat with dicyclomine prior to meals.  If symptoms do not improve, worsen, can consider an ultrasound thereafter.    Orders:  -     dicyclomine (BENTYL) 10 MG capsule; Take 1 capsule by mouth 4 (Four) Times a Day Before Meals & at Bedtime.  Dispense: " 120 capsule; Refill: 2    2. Primary insomnia  Assessment & Plan:  Did not tolerate amitriptyline, but patient was on the lowest dose of hydroxyzine, so we will increase this to 5200 mg nightly to see if this provides better relief.  Can consider trying Seroquel Do not think that benzodiazepines are the way to go to treat insomnia.    Orders:  -     hydrOXYzine (ATARAX) 50 MG tablet; Take 1-2 tablets by mouth At Night As Needed (insomnia).  Dispense: 180 tablet; Refill: 1    3. Intractable chronic migraine without aura and without status migrainosus        Follow Up:  No follow-ups on file.    Patient was given instructions and counseling regarding her condition or for health maintenance advice. Please see specific information pulled into the AVS if appropriate.

## 2023-02-08 DIAGNOSIS — F41.9 ANXIETY: ICD-10-CM

## 2023-02-08 DIAGNOSIS — F33.41 RECURRENT MAJOR DEPRESSIVE DISORDER, IN PARTIAL REMISSION: ICD-10-CM

## 2023-02-08 RX ORDER — LAMOTRIGINE 100 MG/1
100 TABLET ORAL 2 TIMES DAILY
Qty: 90 TABLET | Refills: 1 | Status: SHIPPED | OUTPATIENT
Start: 2023-02-08 | End: 2023-03-22 | Stop reason: SDUPTHER

## 2023-02-08 NOTE — TELEPHONE ENCOUNTER
Caller: Meghan Lui    Relationship: Self    Best call back number: [phone]    Requested Prescriptions:   Requested Prescriptions     Pending Prescriptions Disp Refills   • lamoTRIgine (LaMICtal) 100 MG tablet 90 tablet 1     Sig: Take 1 tablet by mouth Daily.        Pharmacy where request should be sent: [address] - [phone]  - [phone] FX     Additional details provided by patient: PATIENT STATES THAT SHE IS TAKEN THIS 2 TIMES A DAY AND THE PRESCRIPTION IS ONLY WRITEN FOR ONCE A DAY.  SHE NEEDS A NEW SCRIPT SENT TO STATE ONE TABLET 2 TIMES A DAY.     Does the patient have less than a 3 day supply:  [x] Yes  [] No    Would you like a call back once the refill request has been completed: [x] Yes [] No    If the office needs to give you a call back, can they leave a voicemail: [x] Yes [] No    Sacha Holguin Rep   02/08/23 11:04 EST

## 2023-03-22 ENCOUNTER — LAB (OUTPATIENT)
Dept: LAB | Facility: HOSPITAL | Age: 33
End: 2023-03-22
Payer: COMMERCIAL

## 2023-03-22 ENCOUNTER — OFFICE VISIT (OUTPATIENT)
Dept: FAMILY MEDICINE CLINIC | Facility: CLINIC | Age: 33
End: 2023-03-22
Payer: COMMERCIAL

## 2023-03-22 VITALS
HEART RATE: 84 BPM | BODY MASS INDEX: 39.68 KG/M2 | SYSTOLIC BLOOD PRESSURE: 106 MMHG | RESPIRATION RATE: 20 BRPM | OXYGEN SATURATION: 100 % | WEIGHT: 293 LBS | HEIGHT: 72 IN | DIASTOLIC BLOOD PRESSURE: 80 MMHG

## 2023-03-22 DIAGNOSIS — F33.41 RECURRENT MAJOR DEPRESSIVE DISORDER, IN PARTIAL REMISSION: ICD-10-CM

## 2023-03-22 DIAGNOSIS — T14.8XXA BRUISING: ICD-10-CM

## 2023-03-22 DIAGNOSIS — E66.01 CLASS 3 SEVERE OBESITY DUE TO EXCESS CALORIES WITH SERIOUS COMORBIDITY AND BODY MASS INDEX (BMI) OF 40.0 TO 44.9 IN ADULT: ICD-10-CM

## 2023-03-22 DIAGNOSIS — F41.9 ANXIETY: ICD-10-CM

## 2023-03-22 DIAGNOSIS — E03.9 HYPOTHYROIDISM, UNSPECIFIED TYPE: ICD-10-CM

## 2023-03-22 DIAGNOSIS — Z00.00 ANNUAL PHYSICAL EXAM: ICD-10-CM

## 2023-03-22 DIAGNOSIS — R53.83 FATIGUE, UNSPECIFIED TYPE: ICD-10-CM

## 2023-03-22 DIAGNOSIS — M51.36 DDD (DEGENERATIVE DISC DISEASE), LUMBAR: ICD-10-CM

## 2023-03-22 DIAGNOSIS — R53.83 FATIGUE, UNSPECIFIED TYPE: Primary | ICD-10-CM

## 2023-03-22 LAB
BASOPHILS # BLD AUTO: 0.06 10*3/MM3 (ref 0–0.2)
BASOPHILS NFR BLD AUTO: 1 % (ref 0–1.5)
DEPRECATED RDW RBC AUTO: 42.1 FL (ref 37–54)
EOSINOPHIL # BLD AUTO: 0.07 10*3/MM3 (ref 0–0.4)
EOSINOPHIL NFR BLD AUTO: 1.2 % (ref 0.3–6.2)
ERYTHROCYTE [DISTWIDTH] IN BLOOD BY AUTOMATED COUNT: 14.1 % (ref 12.3–15.4)
FERRITIN SERPL-MCNC: 10.7 NG/ML (ref 13–150)
HCT VFR BLD AUTO: 35.8 % (ref 34–46.6)
HGB BLD-MCNC: 11.7 G/DL (ref 12–15.9)
IMM GRANULOCYTES # BLD AUTO: 0.02 10*3/MM3 (ref 0–0.05)
IMM GRANULOCYTES NFR BLD AUTO: 0.3 % (ref 0–0.5)
IRON 24H UR-MRATE: 29 MCG/DL (ref 37–145)
IRON SATN MFR SERPL: 6 % (ref 20–50)
LYMPHOCYTES # BLD AUTO: 1.4 10*3/MM3 (ref 0.7–3.1)
LYMPHOCYTES NFR BLD AUTO: 24.3 % (ref 19.6–45.3)
MCH RBC QN AUTO: 27.1 PG (ref 26.6–33)
MCHC RBC AUTO-ENTMCNC: 32.7 G/DL (ref 31.5–35.7)
MCV RBC AUTO: 83.1 FL (ref 79–97)
MONOCYTES # BLD AUTO: 0.29 10*3/MM3 (ref 0.1–0.9)
MONOCYTES NFR BLD AUTO: 5 % (ref 5–12)
NEUTROPHILS NFR BLD AUTO: 3.93 10*3/MM3 (ref 1.7–7)
NEUTROPHILS NFR BLD AUTO: 68.2 % (ref 42.7–76)
NRBC BLD AUTO-RTO: 0 /100 WBC (ref 0–0.2)
PLATELET # BLD AUTO: 275 10*3/MM3 (ref 140–450)
PMV BLD AUTO: 10.7 FL (ref 6–12)
RBC # BLD AUTO: 4.31 10*6/MM3 (ref 3.77–5.28)
TIBC SERPL-MCNC: 514 MCG/DL (ref 298–536)
TRANSFERRIN SERPL-MCNC: 345 MG/DL (ref 200–360)
WBC NRBC COR # BLD: 5.77 10*3/MM3 (ref 3.4–10.8)

## 2023-03-22 PROCEDURE — 84466 ASSAY OF TRANSFERRIN: CPT

## 2023-03-22 PROCEDURE — 83540 ASSAY OF IRON: CPT

## 2023-03-22 PROCEDURE — 85025 COMPLETE CBC W/AUTO DIFF WBC: CPT

## 2023-03-22 PROCEDURE — 36415 COLL VENOUS BLD VENIPUNCTURE: CPT

## 2023-03-22 PROCEDURE — 82728 ASSAY OF FERRITIN: CPT

## 2023-03-22 RX ORDER — LAMOTRIGINE 100 MG/1
100 TABLET ORAL 2 TIMES DAILY
Qty: 180 TABLET | Refills: 1 | Status: SHIPPED | OUTPATIENT
Start: 2023-03-22

## 2023-03-22 RX ORDER — LEVOTHYROXINE SODIUM 137 MCG
137 TABLET ORAL DAILY
Qty: 90 TABLET | Refills: 1 | Status: SHIPPED | OUTPATIENT
Start: 2023-03-22

## 2023-03-22 RX ORDER — SEMAGLUTIDE 1.34 MG/ML
INJECTION, SOLUTION SUBCUTANEOUS
COMMUNITY
Start: 2023-02-27 | End: 2023-03-22

## 2023-03-22 NOTE — ASSESSMENT & PLAN NOTE
I do think additional weight loss would beneficial for patient, but go ahead and continue with follow-up via pain management, treatment and management of medications per them.

## 2023-03-22 NOTE — PROGRESS NOTES
Meghan Lui presents to [facility] with complaints of frequent bruising, worsening fatigue, issues with weight.  Is also here for 3-month follow-up and annual physical.      History of Present Illness  This is a 32-year-old female who presents to clinic with complaints of frequent bruising, worsening fatigue, issues with weight and is also here for 3-month follow-up and annual physical.    Irritable bowel syndrome: Patient states that the dicyclomine has made a tremendous difference and is improved a lot of her GI issues.  Patient states it is actually regulated her, she states that she does not fluctuate between constipation and diarrhea any longer, and states that she does not have those gas pains that she was experiencing as well.  Is really pleased, overall is doing really well in regards to this.  Would like to remain on this medication.    Degenerative disc disease/neuropathy/obesity: Does continue to follow-up with her pain management provider, states that she knows that a lot of her issues reside around her weight.  States that she does feel like her pain is adequately controlled right now, but she is really wanting to lose some extra weight to try to help with her overall health.  States that it would help with her chronic pain, would help with her high blood pressure, would really overall provide her so much benefit.  Has been on injectable medications in the past, but knows that her insurance will really not cover those and that is not sustainable.  States that she did feel really good when she was on them previously.  She states that she is interested in possibly talking to a bariatric surgeon about bariatric surgery, states that she knows that her other options are limited because her insurance will not cover any of the other medications.  She is wondering what else that she can do.  Does really try to diet, tries to exercise as she can although she does have a  "lot of chronic pain, but wants to lose weight because she knows that contributes to a lot of her other chronic conditions.    Patient does endorse a lot of fatigue, states that she knows some of it is related to her Synthroid, knows that other is related to her seasonal depression that is worse this time of year, but also states that she has been bruising a lot more frequently.  Patient states that she will barely bumped into something, and have a pretty big bruise formed.  States that she also notices that even when her cat walks on her, she will bruise pretty easily.  Is never really had this worked up, did discuss recent blood work with patient that did show some anemia, states that she was worked up for an iron deficiency but that was several years ago and has not had this looked at any further.    The following portions of the patient's history were personally reviewed and updated as appropriate: allergies, current medications, past medical history, past surgical history, past family history, and past social history.       Objective   Vital Signs:   /80   Pulse 84   Resp 20   Ht 185.4 cm (73\")   Wt (!) 143 kg (315 lb)   SpO2 100%   BMI 41.56 kg/m²     Body mass index is 41.56 kg/m².    All labs, imaging, test results, and specialty provider notes reviewed with patient.     Physical Exam  Vitals reviewed.   Constitutional:       Appearance: Normal appearance.   Cardiovascular:      Rate and Rhythm: Normal rate and regular rhythm.      Pulses: Normal pulses.      Heart sounds: Normal heart sounds.   Pulmonary:      Effort: Pulmonary effort is normal.      Breath sounds: Normal breath sounds.   Neurological:      General: No focal deficit present.      Mental Status: She is alert and oriented to person, place, and time.                Assessment and Plan:  Diagnoses and all orders for this visit:    1. Fatigue, unspecified type (Primary)  -     CBC Auto Differential; Future  -     Iron Profile; " Future  -     Ferritin; Future    2. Anxiety  -     lamoTRIgine (LaMICtal) 100 MG tablet; Take 1 tablet by mouth 2 (Two) Times a Day.  Dispense: 180 tablet; Refill: 1    3. Recurrent major depressive disorder, in partial remission (HCC)  -     lamoTRIgine (LaMICtal) 100 MG tablet; Take 1 tablet by mouth 2 (Two) Times a Day.  Dispense: 180 tablet; Refill: 1    4. DDD (degenerative disc disease), lumbar  Assessment & Plan:  I do think additional weight loss would beneficial for patient, but go ahead and continue with follow-up via pain management, treatment and management of medications per them.    Orders:  -     diclofenac (VOLTAREN) 50 MG EC tablet; Take 1 tablet by mouth 2 (Two) Times a Day.  Dispense: 60 tablet; Refill: 2    5. Bruising  -     CBC Auto Differential; Future  -     Iron Profile; Future  -     Ferritin; Future    6. Hypothyroidism, unspecified type  -     Synthroid 137 MCG tablet; Take 1 tablet by mouth Daily.  Dispense: 90 tablet; Refill: 1    7. Class 3 severe obesity due to excess calories with serious comorbidity and body mass index (BMI) of 40.0 to 44.9 in adult (HCC)  Assessment & Plan:  Do think that patient would really benefit from weight loss from a variety of ways.  Do think that it is contributing to some of her other comorbid conditions, is making her back pain worse, so we will go ahead and refer to a bariatric surgeon for her to discuss this process.  Discussed this with her a little bit in the office today, but will refer there for further evaluation.  Patient to continue lifestyle modifications, diet adjustments, and exercise as able.    Orders:  -     Ambulatory Referral to Bariatric Surgery    8. Annual physical exam    As far as fatigue and frequent bruising goes, we will go ahead and obtain an iron panel and repeat CBC.  Do think patient symptoms could be representative of an iron deficiency, and she was anemic per last set of blood work.  If does show for her to be iron  deficient, we will start her on a supplement, and then further work-up can be done if blood work is inconclusive.    Anticipatory Guidelines discussed with patient.    Discussed getting adequate exercise, reduced TV/electronic time, car safety including seat belt use, sexual activity (if applicable), and smoking/alcohol use (if applicable).      Follow Up:  Return in about 6 months (around 9/22/2023).    Patient was given instructions and counseling regarding her condition or for health maintenance advice. Please see specific information pulled into the AVS if appropriate.

## 2023-03-22 NOTE — ASSESSMENT & PLAN NOTE
Do think that patient would really benefit from weight loss from a variety of ways.  Do think that it is contributing to some of her other comorbid conditions, is making her back pain worse, so we will go ahead and refer to a bariatric surgeon for her to discuss this process.  Discussed this with her a little bit in the office today, but will refer there for further evaluation.  Patient to continue lifestyle modifications, diet adjustments, and exercise as able.

## 2023-03-23 RX ORDER — LANOLIN ALCOHOL/MO/W.PET/CERES
325 CREAM (GRAM) TOPICAL
Qty: 90 TABLET | Refills: 1 | Status: SHIPPED | OUTPATIENT
Start: 2023-03-23 | End: 2023-09-19

## 2023-05-30 PROBLEM — N92.0 HEAVY PERIOD: Status: ACTIVE | Noted: 2023-05-30

## 2023-05-30 PROBLEM — M25.473 ANKLE EDEMA: Status: ACTIVE | Noted: 2023-05-30

## 2023-05-30 PROBLEM — G43.909 MIGRAINES: Status: ACTIVE | Noted: 2023-05-30

## 2023-05-30 PROBLEM — M25.50 MULTIPLE JOINT PAIN: Status: ACTIVE | Noted: 2023-05-30

## 2023-05-30 PROBLEM — M54.16 LUMBAR RADICULOPATHY: Status: ACTIVE | Noted: 2022-04-06

## 2023-05-30 PROBLEM — E78.5 HYPERLIPEMIA: Status: ACTIVE | Noted: 2023-05-30

## 2023-05-30 PROBLEM — R26.89 BALANCE DISORDER: Status: ACTIVE | Noted: 2023-05-30

## 2023-05-30 PROBLEM — F31.81 BIPOLAR 2 DISORDER: Status: ACTIVE | Noted: 2023-05-30

## 2023-05-30 PROBLEM — N39.490 OVERFLOW STRESS URINARY INCONTINENCE IN FEMALE: Status: ACTIVE | Noted: 2023-05-30

## 2023-05-30 PROBLEM — E03.9 HYPOTHYROIDISM: Status: ACTIVE | Noted: 2023-05-30

## 2023-05-30 PROBLEM — G47.00 INSOMNIA: Status: ACTIVE | Noted: 2022-12-22

## 2023-05-30 PROBLEM — F41.8 DEPRESSION WITH ANXIETY: Status: ACTIVE | Noted: 2023-05-30

## 2023-05-30 PROBLEM — M19.90 ARTHRITIS: Status: ACTIVE | Noted: 2023-05-30

## 2023-05-30 PROBLEM — F39 MOOD DISORDER: Status: ACTIVE | Noted: 2023-05-30

## 2023-05-30 PROBLEM — N39.3 OVERFLOW STRESS URINARY INCONTINENCE IN FEMALE: Status: ACTIVE | Noted: 2023-05-30

## 2023-05-31 ENCOUNTER — CONSULT (OUTPATIENT)
Dept: BARIATRICS/WEIGHT MGMT | Facility: CLINIC | Age: 33
End: 2023-05-31

## 2023-05-31 VITALS
TEMPERATURE: 98 F | HEIGHT: 72 IN | SYSTOLIC BLOOD PRESSURE: 138 MMHG | BODY MASS INDEX: 39.68 KG/M2 | WEIGHT: 293 LBS | DIASTOLIC BLOOD PRESSURE: 92 MMHG | HEART RATE: 76 BPM

## 2023-05-31 DIAGNOSIS — N92.0 MENORRHAGIA WITH REGULAR CYCLE: ICD-10-CM

## 2023-05-31 DIAGNOSIS — G62.9 NEUROPATHY: ICD-10-CM

## 2023-05-31 DIAGNOSIS — N39.490 OVERFLOW STRESS URINARY INCONTINENCE IN FEMALE: ICD-10-CM

## 2023-05-31 DIAGNOSIS — G47.00 INSOMNIA, UNSPECIFIED TYPE: ICD-10-CM

## 2023-05-31 DIAGNOSIS — M25.473 ANKLE EDEMA: ICD-10-CM

## 2023-05-31 DIAGNOSIS — M51.36 DDD (DEGENERATIVE DISC DISEASE), LUMBAR: ICD-10-CM

## 2023-05-31 DIAGNOSIS — E66.01 CLASS 3 SEVERE OBESITY DUE TO EXCESS CALORIES WITH SERIOUS COMORBIDITY AND BODY MASS INDEX (BMI) OF 40.0 TO 44.9 IN ADULT: Primary | ICD-10-CM

## 2023-05-31 DIAGNOSIS — F41.8 DEPRESSION WITH ANXIETY: ICD-10-CM

## 2023-05-31 DIAGNOSIS — K21.9 GASTROESOPHAGEAL REFLUX DISEASE WITHOUT ESOPHAGITIS: ICD-10-CM

## 2023-05-31 DIAGNOSIS — F31.81 BIPOLAR 2 DISORDER: ICD-10-CM

## 2023-05-31 DIAGNOSIS — I10 PRIMARY HYPERTENSION: ICD-10-CM

## 2023-05-31 DIAGNOSIS — E78.5 HYPERLIPIDEMIA, UNSPECIFIED HYPERLIPIDEMIA TYPE: ICD-10-CM

## 2023-05-31 DIAGNOSIS — E03.9 HYPOTHYROIDISM, UNSPECIFIED TYPE: ICD-10-CM

## 2023-05-31 DIAGNOSIS — N39.3 OVERFLOW STRESS URINARY INCONTINENCE IN FEMALE: ICD-10-CM

## 2023-05-31 PROBLEM — J35.8 TONSIL STONE: Status: RESOLVED | Noted: 2022-12-22 | Resolved: 2023-05-31

## 2023-05-31 RX ORDER — HYDROCODONE BITARTRATE AND ACETAMINOPHEN 5; 325 MG/1; MG/1
1 TABLET ORAL AS NEEDED
COMMUNITY
Start: 2023-04-28

## 2023-05-31 RX ORDER — AMOXICILLIN 500 MG/1
CAPSULE ORAL
COMMUNITY
Start: 2023-04-28 | End: 2023-05-31

## 2023-05-31 RX ORDER — SEMAGLUTIDE 1.34 MG/ML
INJECTION, SOLUTION SUBCUTANEOUS
COMMUNITY
Start: 2023-05-23 | End: 2023-05-31

## 2023-05-31 NOTE — PROGRESS NOTES
[facility]AT[facility] BARIATRIC SURGERY  [address] [facility] [address]  [facility] [id number] [phone]  [address] [id number] [phone]  Dept: [phone]  5/31/2023      Meghan Lui.  [id number]  [id number]  1990  female      Chief Complaint of weight gain; unable to maintain weight loss    History of Present Illness:   Meghan is a 33 y.o. female who presents today for evaluation, education and consultation regarding weight loss surgery. The patient is interested in the sleeve gastrectomy.      Diet History:Meghan has been overweight for at least 15-20 years, has been 35 pounds or more overweight for at least 15-20 years, has been 100 pounds or more overweight for 13-15 or more years and started dieting at age 18.  The most weight Meghan lost was 45 pounds with working outside for [employer] and being more active and maintained the weight loss for 6 months. Meghna describes her eating habits as snacking without portion control, drinking coffee and soda, boredom eating. Meghan Lui has tried Nutrisystem, Weight Watchers, Fasting, Physician monitored, reduced calorie, exercising, prescription medications and meal replacement shakes among others with success of losing up to 45 pounds, but in each instance regained the weight.     See dietician documentation for complete history.    Bariatric Surgery Evaluation: The patient is being seen for an initial visit for bariatric surgery evaluation and education.     Bariatric Co-morbidities:  hypertension, dyslipidemia, back pain, knee pain, menstrual irregularities, depression and mental health disease    Patient Active Problem List   Diagnosis   • Chondromalacia, bilateral knee   • Class 3 severe obesity due to excess calories with serious comorbidity and body mass index (BMI) of 40.0 to 44.9 in adult   • Intractable chronic migraine without aura and without status migrainosus   •  Gastroesophageal reflux disease without esophagitis   • Insomnia   • DDD (degenerative disc disease), lumbar   • Neuropathy   • Primary hypertension   • Recurrent major depressive disorder, in partial remission   • Tonsil stone   • Hashimoto's thyroiditis   • Irritable bowel syndrome with both constipation and diarrhea   • Mood disorder   • Migraines   • Lumbar radiculopathy   • Hypothyroidism   • Arthritis   • Ankle edema   • Hyperlipemia   • Overflow stress urinary incontinence in female   • Heavy period   • Multiple joint pain   • Balance disorder   • Depression with anxiety   • Bipolar 2 disorder       Past Medical History:   Diagnosis Date   • Anxiety    • Arthritis    • Bladder problem    • Cholelithiasis     Removed 2011   • Condition not found     HERNIATED DISC (UNSPECIFIED)    • Depression    • Foot pain, left 2019   • Heel pain    • HL (hearing loss)    • Hx of blood diseases    • Hx of degenerative disc disease    • Hypothyroidism    • Ingrown toenail    • Irritable bowel syndrome    • Leg pain    • Leg swelling    • Lumbago    • Migraines    • Mood disorder    • Muscle cramps    • Numbness in feet    • Obesity    • Thyroid disease    • Visual impairment        Past Surgical History:   Procedure Laterality Date   •  SECTION      , ,    • CHOLECYSTECTOMY     • DILATION AND CURETTAGE, DIAGNOSTIC / THERAPEUTIC     • WISDOM TOOTH EXTRACTION         Allergies   Allergen Reactions   • Remeron [Mirtazapine] Other (See Comments)     Suicidal ideation   • Buspirone Mental Status Change     Suicidal ideation   • Mushroom GI Intolerance         Current Outpatient Medications:   •  amoxicillin (AMOXIL) 500 MG capsule, , Disp: , Rfl:   •  clindamycin (CLEOCIN T) 1 % external solution, clindamycin phosphate 1 % topical solution, Disp: , Rfl:   •  diclofenac (VOLTAREN) 50 MG EC tablet, Take 1 tablet by mouth 2 (Two) Times a Day., Disp: 60 tablet, Rfl: 2  •  dicyclomine  (BENTYL) 10 MG capsule, Take 1 capsule by mouth 4 (Four) Times a Day Before Meals & at Bedtime., Disp: 120 capsule, Rfl: 2  •  ferrous sulfate 325 (65 FE) MG EC tablet, Take 1 tablet by mouth Daily With Breakfast for 180 days., Disp: 90 tablet, Rfl: 1  •  gabapentin (NEURONTIN) 800 MG tablet, 1 tablet 2 (Two) Times a Day., Disp: , Rfl:   •  HYDROcodone-acetaminophen (NORCO) 5-325 MG per tablet, , Disp: , Rfl:   •  hydrOXYzine (ATARAX) 50 MG tablet, Take 1-2 tablets by mouth At Night As Needed (insomnia)., Disp: 180 tablet, Rfl: 1  •  lamoTRIgine (LaMICtal) 100 MG tablet, Take 1 tablet by mouth 2 (Two) Times a Day., Disp: 180 tablet, Rfl: 1  •  lisinopril (PRINIVIL,ZESTRIL) 5 MG tablet, Take 1 tablet by mouth Daily., Disp: 90 tablet, Rfl: 1  •  Ozempic, 0.25 or 0.5 MG/DOSE, 2 MG/1.5ML solution pen-injector, , Disp: , Rfl:   •  Synthroid 137 MCG tablet, Take 1 tablet by mouth Daily., Disp: 90 tablet, Rfl: 1  •  triamcinolone (KENALOG) 0.1 % ointment, triamcinolone acetonide 0.1 % topical ointment, Disp: , Rfl:     Social History     Socioeconomic History   • Marital status: Single   Tobacco Use   • Smoking status: Former     Packs/day: 0.50     Years: 16.00     Pack years: 8.00     Types: Cigarettes     Quit date: 2021     Years since quittin.4   • Smokeless tobacco: Never   Vaping Use   • Vaping Use: Every day   • Substances: Nicotine   • Devices: Disposable   Substance and Sexual Activity   • Alcohol use: Not Currently     Comment: CURRENT SOME DAY    • Drug use: Never   • Sexual activity: Yes     Partners: Male     Birth control/protection: None       Family History   Problem Relation Age of Onset   • Diabetes Mother    • Hypertension Mother    • Cancer Father    • Arthritis Sister    • Cancer Paternal Grandfather    • Arthritis Paternal Grandmother    • Cancer Paternal Grandmother          Review of Systems:  Review of Systems   Constitutional: Positive for fatigue.   HENT: Negative.    Respiratory:  Negative.    Cardiovascular: Positive for leg swelling.   Gastrointestinal: Positive for constipation and diarrhea.   Endocrine: Negative.    Genitourinary: Negative.    Musculoskeletal: Positive for arthralgias, back pain and neck pain.   Skin: Negative.    Neurological: Negative.    Psychiatric/Behavioral: Positive for sleep disturbance.       Physical Exam:  Vital Signs:  Weight: (!) 147 kg (325 lb)   Body mass index is 44.5 kg/m².  Temp: 98 °F (36.7 °C)   Heart Rate: 76   BP: 138/92     Physical Exam  Vitals and nursing note reviewed.   Constitutional:       Appearance: She is well-developed. She is obese.   HENT:      Head: Normocephalic and atraumatic.   Eyes:      Pupils: Pupils are equal, round, and reactive to light.   Cardiovascular:      Rate and Rhythm: Normal rate and regular rhythm.   Pulmonary:      Effort: Pulmonary effort is normal. No respiratory distress.      Breath sounds: Normal breath sounds. No wheezing.   Abdominal:      General: Bowel sounds are normal. There is no distension.      Palpations: Abdomen is soft.      Tenderness: There is no abdominal tenderness.   Musculoskeletal:         General: Normal range of motion.      Cervical back: Normal range of motion.   Skin:     General: Skin is warm and dry.   Neurological:      Mental Status: She is alert and oriented to person, place, and time.   Psychiatric:         Behavior: Behavior normal.            Assessment:         Meghan Lui is a 33 y.o. year old female with medically complicated severe obesity. Weight: (!) 147 kg (325 lb), Body mass index is 44.5 kg/m². and weight related problems including hypertension, dyslipidemia, back pain, knee pain, menstrual irregularities, depression and mental health disease .    I explained in detail, potential surgical options of interest to the patient including the RNY gastric bypass, sleeve gastrectomy, and gastric band while considering the patient's medical history. At this time, the patient  expressed interest in the Laparoscopic Sleeve  All of those procedures can be performed laparoscopically but there is a chance to convert to open if any technical challenges or complications do occur.  Bariatric surgery is not cosmetic surgery but rather a tool to help a patient make a life-long commitment lifestyle changes including diet, exercise, behavior changes, and taking supplemental vitamins and minerals.    Due to the patient's BMI, history, and co-morbidities related to potential surgical complications were evaluated. Due to Meghan Lui's risk factors female will obtain the following prior to being scheduled for surgical intervention:    A letter of medical support as well as a history and physical must be obtained from her primary care provider. The patient was given a copy of a sample form, that will suffice as their letter to take to their primary are provider.    A referral for pre-operative psychological evaluation was ordered for the patient to evaluate candidacy as well as provide mental health support, should it be warranted before or after surgery.    Previous lab work and radiology was reviewed. CBC dated 3/22/23 did show mild anemia, patient is being treated for iron deficiency. Hba1c and TSh dated 12/22/22 were WNL, Cr was mildly elevated on CMP.  and  EKG, CXR and psychology clearance, UGI, and h.pylori breath test were ordered at this time. These will be drawn and patient will be notified with results. Patient will complete new, pre-operative radiology prior to being scheduled for surgery.    Meghan Lui was screened for sleep apnea in our office today and based on their results she is low risk for JACINTO. The risks, as they relate to chronic hypercapnia r/t untreated JACINTO were discussed with the patient and She verbalized understanding.     The risks, benefits, alternatives, and potential complications of all of the sleeve gastrectomy explained in detail including, but not limited to  death, anesthesia and medication adverse effect/DVT, pulmonary embolism, trocar site/incisional hernia, wound infection, abdominal infection, bleeding, failure to lose weight or gain weight and change in body image, metabolic complications with calcium, thiamine, vitamin B12, folate, iron, and anemia.    As this patient is a female of childbearing age she was counseled regarding conception and pregnancy after surgery. Patient was advised that conception and pregnancy within the first 18 months following surgery is not advised due to increased metabolic demands required during pregnancy as well as increased risk of nutrient and vitamin deficiencies which could jeopardize fetal development and birth weight.    The patient was advised to start a high protein, low fat and low carbohydrate diet  start routine exercise including but not limited to 150 minutes per week. The patient received a resistance band along with a handout of exercises. The patient was given individualized information by our dietician along with handouts.     The patient was given information regarding the VISH educational video. VISH is an internet based educational video which explains the sourgical procedure and answers basic questions regarding the procedure. The patient was provided with instructions and a password to watch the video.    The patient understands the surgical procedures and the different surgical options that are available.  She understands the lifestyle changes that would be required after surgery and has agreed to participate in a pre-operative and postoperative weight management program.  She also expressed understanding of possible risks, had several questions answered and desires to proceed.    I think she is a good candidate for this surgery, and is interested in a sleeve gastrectomy.    Encounter Diagnoses   Name Primary?   • Class 3 severe obesity due to excess calories with serious comorbidity and body mass index (BMI) of  40.0 to 44.9 in adult Yes   • Primary hypertension    • Gastroesophageal reflux disease without esophagitis    • Menorrhagia with regular cycle    • Depression with anxiety    • Bipolar 2 disorder    • Overflow stress urinary incontinence in female    • Hyperlipidemia, unspecified hyperlipidemia type    • Ankle edema    • DDD (degenerative disc disease), lumbar    • Insomnia, unspecified type        Plan:    Patient will be evaluated by a bariatric dietician psychologist before undergoing a multidisciplinary review of her candidacy. We discussed weight loss requirements prior to surgery and rationale, as well as other program requirements to ensure the safest approach to surgery. We spent time discussing different surgical procedures and plan of care throughout their lifespan to ensure long term success in achieving and maintaining a healthier weight. Patient will proceed with preoperative lab work, radiology, and endoscopy after obtaining agreed upon specialty, clearances, sleep study, and letter of support from her primary care provider.    Total time spent during this encounter today was 65 minutes and includes   DARINEL Pérez  5/31/2023

## 2023-07-24 ENCOUNTER — HOSPITAL ENCOUNTER (OUTPATIENT)
Dept: GENERAL RADIOLOGY | Facility: HOSPITAL | Age: 33
Discharge: HOME | End: 2023-07-24
Admitting: NURSE PRACTITIONER
Payer: COMMERCIAL

## 2023-07-24 DIAGNOSIS — M51.36 DDD (DEGENERATIVE DISC DISEASE), LUMBAR: ICD-10-CM

## 2023-07-24 DIAGNOSIS — K21.9 GASTROESOPHAGEAL REFLUX DISEASE WITHOUT ESOPHAGITIS: ICD-10-CM

## 2023-07-24 PROCEDURE — 74246 X-RAY XM UPR GI TRC 2CNTRST: CPT

## 2023-07-27 ENCOUNTER — HOSPITAL ENCOUNTER (OUTPATIENT)
Dept: GENERAL RADIOLOGY | Facility: HOSPITAL | Age: 33
Discharge: HOME | End: 2023-07-27
Payer: COMMERCIAL

## 2023-07-27 ENCOUNTER — LAB (OUTPATIENT)
Dept: LAB | Facility: HOSPITAL | Age: 33
End: 2023-07-27
Payer: COMMERCIAL

## 2023-07-27 ENCOUNTER — TELEPHONE (OUTPATIENT)
Dept: BARIATRICS/WEIGHT MGMT | Facility: CLINIC | Age: 33
End: 2023-07-27
Payer: COMMERCIAL

## 2023-07-27 ENCOUNTER — HOSPITAL ENCOUNTER (OUTPATIENT)
Dept: CARDIOLOGY | Facility: HOSPITAL | Age: 33
Discharge: HOME | End: 2023-07-27
Payer: COMMERCIAL

## 2023-07-27 DIAGNOSIS — E66.01 CLASS 3 SEVERE OBESITY DUE TO EXCESS CALORIES WITH SERIOUS COMORBIDITY AND BODY MASS INDEX (BMI) OF 40.0 TO 44.9 IN ADULT: ICD-10-CM

## 2023-07-27 DIAGNOSIS — M25.473 ANKLE EDEMA: ICD-10-CM

## 2023-07-27 DIAGNOSIS — G47.00 INSOMNIA, UNSPECIFIED TYPE: ICD-10-CM

## 2023-07-27 DIAGNOSIS — N39.3 OVERFLOW STRESS URINARY INCONTINENCE IN FEMALE: ICD-10-CM

## 2023-07-27 DIAGNOSIS — N92.0 MENORRHAGIA WITH REGULAR CYCLE: ICD-10-CM

## 2023-07-27 DIAGNOSIS — F31.81 BIPOLAR 2 DISORDER: ICD-10-CM

## 2023-07-27 DIAGNOSIS — I10 PRIMARY HYPERTENSION: ICD-10-CM

## 2023-07-27 DIAGNOSIS — M51.36 DDD (DEGENERATIVE DISC DISEASE), LUMBAR: ICD-10-CM

## 2023-07-27 DIAGNOSIS — K21.9 GASTROESOPHAGEAL REFLUX DISEASE WITHOUT ESOPHAGITIS: ICD-10-CM

## 2023-07-27 DIAGNOSIS — E78.5 HYPERLIPIDEMIA, UNSPECIFIED HYPERLIPIDEMIA TYPE: ICD-10-CM

## 2023-07-27 DIAGNOSIS — F41.8 DEPRESSION WITH ANXIETY: ICD-10-CM

## 2023-07-27 DIAGNOSIS — E66.01 CLASS 3 SEVERE OBESITY DUE TO EXCESS CALORIES WITH SERIOUS COMORBIDITY AND BODY MASS INDEX (BMI) OF 40.0 TO 44.9 IN ADULT: Primary | ICD-10-CM

## 2023-07-27 DIAGNOSIS — N39.490 OVERFLOW STRESS URINARY INCONTINENCE IN FEMALE: ICD-10-CM

## 2023-07-27 PROCEDURE — 83013 H PYLORI (C-13) BREATH: CPT

## 2023-07-27 PROCEDURE — 71046 X-RAY EXAM CHEST 2 VIEWS: CPT

## 2023-07-27 PROCEDURE — 93005 ELECTROCARDIOGRAM TRACING: CPT | Performed by: NURSE PRACTITIONER

## 2023-07-29 LAB
QT INTERVAL: 408 MS
UREA BREATH TEST QL: NEGATIVE

## 2023-08-04 DIAGNOSIS — I44.0 PROLONGED P-R INTERVAL: Primary | ICD-10-CM

## 2023-08-09 ENCOUNTER — LAB (OUTPATIENT)
Dept: LAB | Facility: HOSPITAL | Age: 33
End: 2023-08-09
Payer: COMMERCIAL

## 2023-08-09 ENCOUNTER — TELEPHONE (OUTPATIENT)
Dept: BARIATRICS/WEIGHT MGMT | Facility: CLINIC | Age: 33
End: 2023-08-09
Payer: COMMERCIAL

## 2023-08-09 ENCOUNTER — OFFICE VISIT (OUTPATIENT)
Dept: FAMILY MEDICINE CLINIC | Facility: CLINIC | Age: 33
End: 2023-08-09
Payer: COMMERCIAL

## 2023-08-09 VITALS
BODY MASS INDEX: 39.68 KG/M2 | OXYGEN SATURATION: 98 % | HEART RATE: 78 BPM | SYSTOLIC BLOOD PRESSURE: 112 MMHG | WEIGHT: 293 LBS | HEIGHT: 72 IN | DIASTOLIC BLOOD PRESSURE: 72 MMHG | TEMPERATURE: 97.6 F

## 2023-08-09 DIAGNOSIS — J90 PLEURAL EFFUSION: ICD-10-CM

## 2023-08-09 DIAGNOSIS — R06.83 SNORING: ICD-10-CM

## 2023-08-09 DIAGNOSIS — R60.0 LOWER EXTREMITY EDEMA: ICD-10-CM

## 2023-08-09 DIAGNOSIS — R06.02 SHORTNESS OF BREATH: Primary | ICD-10-CM

## 2023-08-09 DIAGNOSIS — I10 PRIMARY HYPERTENSION: ICD-10-CM

## 2023-08-09 DIAGNOSIS — F51.01 PRIMARY INSOMNIA: ICD-10-CM

## 2023-08-09 DIAGNOSIS — R06.02 SHORTNESS OF BREATH: ICD-10-CM

## 2023-08-09 DIAGNOSIS — H91.93 BILATERAL HEARING LOSS, UNSPECIFIED HEARING LOSS TYPE: ICD-10-CM

## 2023-08-09 DIAGNOSIS — R42 DIZZINESS: ICD-10-CM

## 2023-08-09 LAB
ALBUMIN SERPL-MCNC: 4 G/DL (ref 3.5–5.2)
ALBUMIN/GLOB SERPL: 1.7 G/DL
ALP SERPL-CCNC: 47 U/L (ref 39–117)
ALT SERPL W P-5'-P-CCNC: 11 U/L (ref 1–33)
ANION GAP SERPL CALCULATED.3IONS-SCNC: 9.1 MMOL/L (ref 5–15)
AST SERPL-CCNC: 9 U/L (ref 1–32)
BASOPHILS # BLD AUTO: 0.09 10*3/MM3 (ref 0–0.2)
BASOPHILS NFR BLD AUTO: 1.2 % (ref 0–1.5)
BILIRUB SERPL-MCNC: 0.3 MG/DL (ref 0–1.2)
BUN SERPL-MCNC: 14 MG/DL (ref 6–20)
BUN/CREAT SERPL: 13 (ref 7–25)
CALCIUM SPEC-SCNC: 9.1 MG/DL (ref 8.6–10.5)
CHLORIDE SERPL-SCNC: 104 MMOL/L (ref 98–107)
CO2 SERPL-SCNC: 27.9 MMOL/L (ref 22–29)
CREAT SERPL-MCNC: 1.08 MG/DL (ref 0.57–1)
DEPRECATED RDW RBC AUTO: 47.1 FL (ref 37–54)
EGFRCR SERPLBLD CKD-EPI 2021: 69.7 ML/MIN/1.73
EOSINOPHIL # BLD AUTO: 0.09 10*3/MM3 (ref 0–0.4)
EOSINOPHIL NFR BLD AUTO: 1.2 % (ref 0.3–6.2)
ERYTHROCYTE [DISTWIDTH] IN BLOOD BY AUTOMATED COUNT: 13.9 % (ref 12.3–15.4)
GLOBULIN UR ELPH-MCNC: 2.4 GM/DL
GLUCOSE SERPL-MCNC: 98 MG/DL (ref 65–99)
HCT VFR BLD AUTO: 41.4 % (ref 34–46.6)
HGB BLD-MCNC: 14.1 G/DL (ref 12–15.9)
IMM GRANULOCYTES # BLD AUTO: 0.01 10*3/MM3 (ref 0–0.05)
IMM GRANULOCYTES NFR BLD AUTO: 0.1 % (ref 0–0.5)
LYMPHOCYTES # BLD AUTO: 1.8 10*3/MM3 (ref 0.7–3.1)
LYMPHOCYTES NFR BLD AUTO: 24.7 % (ref 19.6–45.3)
MCH RBC QN AUTO: 31.3 PG (ref 26.6–33)
MCHC RBC AUTO-ENTMCNC: 34.1 G/DL (ref 31.5–35.7)
MCV RBC AUTO: 92 FL (ref 79–97)
MONOCYTES # BLD AUTO: 0.48 10*3/MM3 (ref 0.1–0.9)
MONOCYTES NFR BLD AUTO: 6.6 % (ref 5–12)
NEUTROPHILS NFR BLD AUTO: 4.81 10*3/MM3 (ref 1.7–7)
NEUTROPHILS NFR BLD AUTO: 66.2 % (ref 42.7–76)
NRBC BLD AUTO-RTO: 0 /100 WBC (ref 0–0.2)
NT-PROBNP SERPL-MCNC: 39.4 PG/ML (ref 0–450)
PLATELET # BLD AUTO: 228 10*3/MM3 (ref 140–450)
PMV BLD AUTO: 10 FL (ref 6–12)
POTASSIUM SERPL-SCNC: 4.3 MMOL/L (ref 3.5–5.2)
PROT SERPL-MCNC: 6.4 G/DL (ref 6–8.5)
RBC # BLD AUTO: 4.5 10*6/MM3 (ref 3.77–5.28)
SODIUM SERPL-SCNC: 141 MMOL/L (ref 136–145)
WBC NRBC COR # BLD: 7.28 10*3/MM3 (ref 3.4–10.8)

## 2023-08-09 PROCEDURE — 80053 COMPREHEN METABOLIC PANEL: CPT

## 2023-08-09 PROCEDURE — 83880 ASSAY OF NATRIURETIC PEPTIDE: CPT

## 2023-08-09 PROCEDURE — 85025 COMPLETE CBC W/AUTO DIFF WBC: CPT

## 2023-08-09 PROCEDURE — 36415 COLL VENOUS BLD VENIPUNCTURE: CPT

## 2023-08-09 RX ORDER — HYDROCHLOROTHIAZIDE 12.5 MG/1
12.5 TABLET ORAL DAILY
Qty: 30 TABLET | Refills: 0 | Status: SHIPPED | OUTPATIENT
Start: 2023-08-09

## 2023-08-09 RX ORDER — HYDROXYZINE PAMOATE 50 MG/1
50 CAPSULE ORAL 3 TIMES DAILY PRN
COMMUNITY
Start: 2023-07-10

## 2023-08-09 RX ORDER — LAMOTRIGINE 200 MG/1
200 TABLET ORAL 2 TIMES DAILY
COMMUNITY
Start: 2023-08-08

## 2023-08-09 RX ORDER — PRAZOSIN HYDROCHLORIDE 2 MG/1
2 CAPSULE ORAL NIGHTLY
COMMUNITY

## 2023-08-09 NOTE — PROGRESS NOTES
Meghan Lui presents to [facility] with complaints of bilateral ear fuzziness, snoring, difficulty sleeping, dizziness, lightheadedness, and concern with recent EKG/chest x-ray.      History of Present Illness  This is a 33-year-old female who presents to the clinic with complaints of bilateral ear fuzziness, snoring, difficulty sleeping, dizziness, lightheadedness, concern for recent EKG/chest x-ray.    Patient states that she was recently seen by her psychiatrist, they have been adjusting medications, trying to find medications that worked best for her mental health, when patient states that she was complaining of this fuzziness/buzzing sensation in both of her ears.  Patient states that she has had balance issues and this symptoms going on for a few years now, but is definitely been made worse recently.  States that she does have associated dizziness and lightheadedness, and that these episodes happen pretty frequently.  States that her psychiatrist recommended she may see a audiologist to test her hearing, as patient does have hearing loss as well.    Patient also states that she has issues with sleeping, it has always been something she has battled with, and her psychiatrist and her working on medication to help with this.  Does admit that she snores quite a lot, feels really fatigued throughout the day, has never been evaluated by a sleep medicine provider and would like to test for sleep apnea.    Patient states that she is recently going to a bariatric surgeon to discuss possible bariatric surgery.  Upon evaluation, they did a routine EKG and chest x-ray, and patient states that she was reading the results and became concerned.  Did review the EKG with patient, did have it prolonged PA interval, which had changed from her previous EKG.  They did set her up with a cardiologist that she sees on Friday.  Patient denies any chest pain, but when evaluating her chest x-ray I  "did note that there is bilateral pleural effusions.  Patient does admit to shortness of breath, states that she thought it was just because she was fat, but does admit to shortness of breath with exertion and sometimes even at rest.  Does admit to lower extremity swelling that is worsened recently as well, and is worse in the evenings.  Has also been having worsening lightheadedness and dizziness.  Denies any chest pain.    The following portions of the patient's history were personally reviewed and updated as appropriate: allergies, current medications, past medical history, past surgical history, past family history, and past social history.       Objective   Vital Signs:   /72 (BP Location: Left arm, Patient Position: Sitting, Cuff Size: Adult)   Pulse 78   Temp 97.6 øF (36.4 øC) (Temporal)   Ht 182 cm (71.65\")   Wt (!) 150 kg (331 lb 1.6 oz)   SpO2 98%   BMI 45.35 kg/mý     Body mass index is 45.35 kg/mý.    All labs, imaging, test results, and specialty provider notes reviewed with patient.     Physical Exam  Vitals reviewed.   Constitutional:       Appearance: Normal appearance.   Cardiovascular:      Rate and Rhythm: Normal rate and regular rhythm.      Pulses: Normal pulses.      Heart sounds: Normal heart sounds.   Pulmonary:      Effort: Pulmonary effort is normal.      Breath sounds: Normal breath sounds.   Neurological:      General: No focal deficit present.      Mental Status: She is alert and oriented to person, place, and time.                       Assessment and Plan:  Diagnoses and all orders for this visit:    1. Shortness of breath (Primary)  Comments:  Obtain CT scan chest, likely related to fluid overload.  Start a low-dose HCTZ.  Orders:  -     BNP; Future  -     CT Chest Without Contrast; Future    2. Pleural effusion  Comments:  New finding, obtain blood work.  Obtain CT scan of chest.  Patient already has appointment with cardiology, will need echo  Orders:  -     BNP; " Future  -     hydroCHLOROthiazide (HYDRODIURIL) 12.5 MG tablet; Take 1 tablet by mouth Daily.  Dispense: 30 tablet; Refill: 0  -     CT Chest Without Contrast; Future    3. Primary hypertension  -     CBC Auto Differential; Future  -     Comprehensive Metabolic Panel; Future    4. Lower extremity edema  -     BNP; Future  -     hydroCHLOROthiazide (HYDRODIURIL) 12.5 MG tablet; Take 1 tablet by mouth Daily.  Dispense: 30 tablet; Refill: 0    5. Primary insomnia  -     Ambulatory Referral to Sleep Medicine    6. Snoring  Comments:  Send to sleep medicine for further evaluation.  Likely needs sleep study, likely obstructive sleep apnea.  Orders:  -     Ambulatory Referral to Sleep Medicine    7. Dizziness    8. Bilateral hearing loss, unspecified hearing loss type  Comments:  Send to audiology for further evaluation, may refer to ENT for further evaluation as well  Orders:  -     Ambulatory Referral to Audiology          Follow Up:  No follow-ups on file.    Patient was given instructions and counseling regarding her condition or for health maintenance advice. Please see specific information pulled into the AVS if appropriate.     Answers submitted by the patient for this visit:  Other (Submitted on 8/3/2023)  Please describe your symptoms.: Ears ringing need to see audiologist and sleep studys  Have you had these symptoms before?: Yes  How long have you been having these symptoms?: Greater than 2 weeks  Primary Reason for Visit (Submitted on 8/3/2023)  What is the primary reason for your visit?: Other

## 2023-08-11 ENCOUNTER — OFFICE VISIT (OUTPATIENT)
Dept: CARDIOLOGY | Facility: CLINIC | Age: 33
End: 2023-08-11
Payer: COMMERCIAL

## 2023-08-11 VITALS
HEIGHT: 72 IN | DIASTOLIC BLOOD PRESSURE: 82 MMHG | SYSTOLIC BLOOD PRESSURE: 127 MMHG | HEART RATE: 69 BPM | WEIGHT: 293 LBS | BODY MASS INDEX: 39.68 KG/M2

## 2023-08-11 DIAGNOSIS — R00.2 PALPITATIONS: Primary | ICD-10-CM

## 2023-08-11 DIAGNOSIS — R94.31 ABNORMAL EKG: ICD-10-CM

## 2023-08-11 DIAGNOSIS — E66.01 MORBID (SEVERE) OBESITY DUE TO EXCESS CALORIES: ICD-10-CM

## 2023-08-11 NOTE — PROGRESS NOTES
Chief Complaint  Prolonged P-R interval    Subjective            Meghan Lui presents to [facility] CARDIOLOGY  History of Present Illness    33-year-old white female.  She is in the preoperative stages for bariatric surgery with gastric sleeve.  She had an EKG recently which showed a prolonged SD interval and cardiology evaluation was requested.  She has occasional palpitations intermittently they occur mostly daily and feel like a hard heartbeat or skipped beat.    PMH  Past Medical History:   Diagnosis Date    Anemia 2023    Anxiety     Arthritis     Bladder problem     Cholelithiasis     Removed 2011    Condition not found     HERNIATED DISC (UNSPECIFIED)     Depression     Foot pain, left 2019    Heel pain     HL (hearing loss)     Hx of blood diseases     Hx of degenerative disc disease     Hyperlipidemia     Hypertension     Hypothyroidism     Ingrown toenail     Irritable bowel syndrome     Leg pain     Leg swelling     Lumbago     Migraines     Mood disorder     Muscle cramps     Neuromuscular disorder 2018    Numbness in feet     Obesity     Thyroid disease     Visual impairment          SURGICALHX  Past Surgical History:   Procedure Laterality Date     SECTION      , ,     CHOLECYSTECTOMY      DILATION AND CURETTAGE, DIAGNOSTIC / THERAPEUTIC      WISDOM TOOTH EXTRACTION          SOC  Social History     Socioeconomic History    Marital status: Single   Tobacco Use    Smoking status: Former     Packs/day: 0.50     Years: 16.00     Pack years: 8.00     Types: Cigarettes     Quit date: 2021     Years since quittin.1    Smokeless tobacco: Never   Vaping Use    Vaping Use: Every day    Start date: 7/15/2021    Substances: Nicotine, Flavoring    Devices: Disposable   Substance and Sexual Activity    Alcohol use: Not Currently     Comment: CURRENT SOME DAY (rare)    Drug use: Never    Sexual activity: Yes     Partners: Male      Birth control/protection: None         FAMHX  Family History   Problem Relation Age of Onset    Diabetes Mother     Hypertension Mother     Vision loss Mother     Cancer Father         Passed 2023    Early death Father         cancer    Thyroid disease Father     Arthritis Sister     Cancer Paternal Grandfather     COPD Paternal Grandfather     Arthritis Paternal Grandmother     Cancer Paternal Grandmother           ALLERGY  Allergies   Allergen Reactions    Remeron [Mirtazapine] Other (See Comments)     Suicidal ideation    Mushroom GI Intolerance    Buspirone Mental Status Change     Suicidal ideation        MEDSCURRENT    Current Outpatient Medications:     Chlorhexidine Gluconate (Latoya-Hex 4) 4 % solution, , Disp: , Rfl:     clindamycin (CLEOCIN T) 1 % external solution, clindamycin phosphate 1 % topical solution, Disp: , Rfl:     diclofenac (VOLTAREN) 50 MG EC tablet, TAKE ONE TABLET BY MOUTH TWICE A DAY, Disp: 60 tablet, Rfl: 2    dicyclomine (BENTYL) 10 MG capsule, Take 1 capsule by mouth 4 (Four) Times a Day Before Meals & at Bedtime., Disp: 120 capsule, Rfl: 2    ferrous sulfate 325 (65 FE) MG EC tablet, Take 1 tablet by mouth Daily With Breakfast for 180 days., Disp: 90 tablet, Rfl: 1    gabapentin (NEURONTIN) 800 MG tablet, 1 tablet 2 (Two) Times a Day., Disp: , Rfl:     hydroCHLOROthiazide (HYDRODIURIL) 12.5 MG tablet, Take 1 tablet by mouth Daily., Disp: 30 tablet, Rfl: 0    HYDROcodone-acetaminophen (NORCO) 7.5-325 MG per tablet, Take 2 tablets by mouth Every 4 (Four) Hours As Needed., Disp: , Rfl:     hydrOXYzine pamoate (VISTARIL) 50 MG capsule, Take 1 capsule by mouth 3 (Three) Times a Day As Needed., Disp: , Rfl:     lamoTRIgine (LaMICtal) 200 MG tablet, Take 1 tablet by mouth 2 (Two) Times a Day., Disp: , Rfl:     lidocaine (XYLOCAINE) 2 % jelly, Apply  topically to the appropriate area as directed As Needed for Mild Pain., Disp: 28 g, Rfl: 0    lisinopril (PRINIVIL,ZESTRIL) 5 MG tablet, TAKE  "ONE TABLET BY MOUTH EVERY DAY, Disp: 90 tablet, Rfl: 1    prazosin (MINIPRESS) 2 MG capsule, Take 1 capsule by mouth Every Night., Disp: , Rfl:     Synthroid 137 MCG tablet, Take 1 tablet by mouth Daily., Disp: 90 tablet, Rfl: 1    triamcinolone (KENALOG) 0.1 % ointment, triamcinolone acetonide 0.1 % topical ointment, Disp: , Rfl:       Review of Systems   Constitutional: Negative.   HENT: Negative.     Eyes: Negative.    Cardiovascular:  Negative for chest pain, irregular heartbeat and palpitations.   Respiratory: Negative.     Endocrine: Negative.    Hematologic/Lymphatic: Negative.    Skin: Negative.    Musculoskeletal: Negative.    Gastrointestinal: Negative.    Genitourinary: Negative.    Neurological: Negative.    Psychiatric/Behavioral: Negative.        Objective     /82   Pulse 69   Ht 182 cm (71.65\")   Wt (!) 150 kg (330 lb)   BMI 45.19 kg/mý       General Appearance:   well developed  well nourished, extremely obese  HENT:   oropharynx moist  lips not cyanotic  Neck:  thyroid not enlarged  supple  Respiratory:  no respiratory distress  normal breath sounds  no rales  Cardiovascular:  no jugular venous distention  regular rhythm  apical impulse normal  S1 normal, S2 normal  no S3, no S4   no murmur  no rub, no thrill  carotid pulses normal; no bruit  pedal pulses normal  lower extremity edema: none    Musculoskeletal:  no clubbing of fingers.   normocephalic, head atraumatic  Skin:   warm, dry  Psychiatric:  judgement and insight appropriate  normal mood and affect      Result Review :     The following data was reviewed by: Navi Garsia MD on 08/11/2023:    CMP          12/22/2022    11:03 8/9/2023    07:41   CMP   Glucose 70  98    BUN 11  14    Creatinine 1.04  1.08    EGFR 73.4  69.7    Sodium 140  141    Potassium 4.3  4.3    Chloride 104  104    Calcium 9.1  9.1    Total Protein 6.6  6.4    Albumin 4.20  4.0    Globulin 2.4  2.4    Total Bilirubin 0.2  0.3    Alkaline " Phosphatase 58  47    AST (SGOT) 9  9    ALT (SGPT) 10  11    Albumin/Globulin Ratio 1.8  1.7    BUN/Creatinine Ratio 10.6  13.0    Anion Gap 9.3  9.1      CBC          12/22/2022    11:03 3/22/2023    11:28 8/9/2023    07:41   CBC   WBC 5.79  5.77  7.28    RBC 4.33  4.31  4.50    Hemoglobin 11.9  11.7  14.1    Hematocrit 36.5  35.8  41.4    MCV 84.3  83.1  92.0    MCH 27.5  27.1  31.3    MCHC 32.6  32.7  34.1    RDW 13.9  14.1  13.9    Platelets 250  275  228      Lipid Panel          12/22/2022    11:03   Lipid Panel   Total Cholesterol 170    Triglycerides 88    HDL Cholesterol 40    VLDL Cholesterol 16    LDL Cholesterol  114    LDL/HDL Ratio 2.81      TSH          12/22/2022    11:03   TSH   TSH 1.320        Data reviewed : EKG personally reviewed by me from last month that showed sinus rhythm, slightly prolonged VT interval 212 ms nonspecific ST changes      Procedures               Assessment and Plan        ASSESSMENT:  Encounter Diagnoses   Name Primary?    Palpitations Yes    Morbid (severe) obesity due to excess calories     Abnormal EKG          PLAN:    1.  Abnormal EKG with slightly prolonged VT interval.  This is negligible, minor first-degree AV block.  I discussed this with the patient today.  She is not having any symptoms to suggest higher grades of AV block so no additional workup is needed for that.  She has been having some intermittent palpitations that sound most likely to be ectopy.  A 4-day Holter monitor will be done today to correlate her symptoms with her rhythm.  2.  Morbid obesity due to excess calories-she is in the preop stages for gastric sleeve surgery.  If her cardiac workup looks good then there will be no cardiac contraindications to proceeding with that  3.  We will discuss diagnostic results when available          Patient was given instructions and counseling regarding her condition or for health maintenance advice. Please see specific information pulled into the AVS if  appropriate.             C Renato Garsia MD  8/11/2023    12:40 EDT

## 2023-09-08 DIAGNOSIS — J90 PLEURAL EFFUSION: ICD-10-CM

## 2023-09-08 DIAGNOSIS — R60.0 LOWER EXTREMITY EDEMA: ICD-10-CM

## 2023-09-08 RX ORDER — HYDROCHLOROTHIAZIDE 12.5 MG/1
TABLET ORAL
Qty: 30 TABLET | Refills: 0 | Status: SHIPPED | OUTPATIENT
Start: 2023-09-08

## 2023-09-14 ENCOUNTER — OFFICE VISIT (OUTPATIENT)
Dept: FAMILY MEDICINE CLINIC | Facility: CLINIC | Age: 33
End: 2023-09-14
Payer: COMMERCIAL

## 2023-09-14 VITALS
DIASTOLIC BLOOD PRESSURE: 70 MMHG | BODY MASS INDEX: 39.68 KG/M2 | HEART RATE: 82 BPM | OXYGEN SATURATION: 98 % | SYSTOLIC BLOOD PRESSURE: 115 MMHG | HEIGHT: 72 IN | TEMPERATURE: 98.2 F | WEIGHT: 293 LBS

## 2023-09-14 DIAGNOSIS — J06.9 VIRAL UPPER RESPIRATORY ILLNESS: ICD-10-CM

## 2023-09-14 DIAGNOSIS — R05.1 ACUTE COUGH: ICD-10-CM

## 2023-09-14 DIAGNOSIS — R06.02 SHORTNESS OF BREATH: Primary | ICD-10-CM

## 2023-09-14 DIAGNOSIS — R51.9 ACUTE NONINTRACTABLE HEADACHE, UNSPECIFIED HEADACHE TYPE: ICD-10-CM

## 2023-09-14 LAB
EXPIRATION DATE: NORMAL
FLUAV AG UPPER RESP QL IA.RAPID: NOT DETECTED
FLUBV AG UPPER RESP QL IA.RAPID: NOT DETECTED
INTERNAL CONTROL: NORMAL
Lab: NORMAL
SARS-COV-2 AG UPPER RESP QL IA.RAPID: NOT DETECTED

## 2023-09-14 PROCEDURE — 99213 OFFICE O/P EST LOW 20 MIN: CPT

## 2023-09-14 PROCEDURE — 3078F DIAST BP <80 MM HG: CPT

## 2023-09-14 PROCEDURE — 3074F SYST BP LT 130 MM HG: CPT

## 2023-09-14 RX ORDER — AMOXICILLIN 500 MG/1
1000 CAPSULE ORAL 2 TIMES DAILY
Qty: 28 CAPSULE | Refills: 0 | Status: SHIPPED | OUTPATIENT
Start: 2023-09-14

## 2023-09-14 RX ORDER — DEXTROMETHORPHAN HYDROBROMIDE AND PROMETHAZINE HYDROCHLORIDE 15; 6.25 MG/5ML; MG/5ML
5 SYRUP ORAL 4 TIMES DAILY PRN
Qty: 180 ML | Refills: 0 | Status: SHIPPED | OUTPATIENT
Start: 2023-09-14

## 2023-09-14 RX ORDER — METHYLPREDNISOLONE ACETATE 80 MG/ML
80 INJECTION, SUSPENSION INTRA-ARTICULAR; INTRALESIONAL; INTRAMUSCULAR; SOFT TISSUE ONCE
Status: COMPLETED | OUTPATIENT
Start: 2023-09-14 | End: 2023-09-14

## 2023-09-14 RX ADMIN — METHYLPREDNISOLONE ACETATE 80 MG: 80 INJECTION, SUSPENSION INTRA-ARTICULAR; INTRALESIONAL; INTRAMUSCULAR; SOFT TISSUE at 13:12

## 2023-09-14 NOTE — PROGRESS NOTES
"Meghan Lui presents to [facility] with complaints of sore throat, headache, trouble breathing, cough, bilateral ear pain and body stiffness.      Sore Throat   Associated symptoms include headaches.   Headache  33-year-old female presents to office today for an acute visit with complaints of sore throat, headache, trouble breathing, cough, bilateral ear pain and body stiffness. She states that yesterday she began having a sore throat and headache. Since that time she has started having pain to bilateral ears, shortness of air with difficulties taking deep breaths and occasional wheezing. She states she also is having some nausea with loss of smell and some taste. She denies and fevers.   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   She denies any seasonal allergies or history of asthma. She has been noted to have some fluid or a black spot on her lungs in the past per patient account. She does not currently smoke. She quit smoking 2 years ago.  Her in office COVID and flu test are negative.    The following portions of the patient's history were personally reviewed and updated as appropriate: allergies, current medications, past medical history, past surgical history, past family history, and past social history.       Objective   Vital Signs:   /70 (BP Location: Left arm, Patient Position: Sitting)   Pulse 82   Temp 98.2 °F (36.8 °C) (Temporal)   Ht 182 cm (71.65\")   Wt (!) 142 kg (312 lb)   SpO2 98%   BMI 42.73 kg/m²     Body mass index is 42.73 kg/m².    All labs, imaging, test results, and specialty provider notes reviewed with patient.     Physical Exam  Vitals and nursing note reviewed.   Constitutional:       General: She is awake.   HENT:      Right Ear: Hearing normal. Swelling present.      Left Ear: Hearing normal. Swelling present.      Mouth/Throat:      Pharynx: Oropharyngeal exudate and " posterior oropharyngeal erythema present.   Cardiovascular:      Rate and Rhythm: Normal rate and regular rhythm.      Heart sounds: Normal heart sounds.   Pulmonary:      Effort: Pulmonary effort is normal. No accessory muscle usage or respiratory distress.      Breath sounds: Examination of the right-upper field reveals rales. Examination of the left-upper field reveals rales. Examination of the right-lower field reveals rales. Examination of the left-lower field reveals rales. Rales present.   Lymphadenopathy:      Cervical: No cervical adenopathy.   Skin:     General: Skin is warm and moist.   Neurological:      Mental Status: She is alert and oriented to person, place, and time.   Psychiatric:         Attention and Perception: Attention normal.         Mood and Affect: Mood normal.         Behavior: Behavior is cooperative.                            Assessment and Plan:  Diagnoses and all orders for this visit:    1. Viral upper respiratory illness (Primary)  -     methylPREDNISolone acetate (DEPO-medrol) injection 80 mg  -     amoxicillin (AMOXIL) 500 MG capsule; Take 2 capsules by mouth 2 (Two) Times a Day.  Dispense: 28 capsule; Refill: 0    2. Acute cough  -     methylPREDNISolone acetate (DEPO-medrol) injection 80 mg  -     promethazine-dextromethorphan (PROMETHAZINE-DM) 6.25-15 MG/5ML syrup; Take 5 mL by mouth 4 (Four) Times a Day As Needed for Cough.  Dispense: 180 mL; Refill: 0  -     amoxicillin (AMOXIL) 500 MG capsule; Take 2 capsules by mouth 2 (Two) Times a Day.  Dispense: 28 capsule; Refill: 0    3. Acute nonintractable headache, unspecified headache type    4. Shortness of breath  -     methylPREDNISolone acetate (DEPO-medrol) injection 80 mg  -     amoxicillin (AMOXIL) 500 MG capsule; Take 2 capsules by mouth 2 (Two) Times a Day.  Dispense: 28 capsule; Refill: 0      Patient to remain home from work until Monday. Will treat symptoms with steroid injection and antibiotics. Will provide additional  symptom management with anti-tussive syrup. Increase rest and fluids over the weekend. Follow-up if symptoms worsen or fail to improve.     Follow Up:  No follow-ups on file.    Patient was given instructions and counseling regarding her condition or for health maintenance advice. Please see specific information pulled into the AVS if appropriate.

## 2023-09-26 ENCOUNTER — OFFICE VISIT (OUTPATIENT)
Dept: FAMILY MEDICINE CLINIC | Facility: CLINIC | Age: 33
End: 2023-09-26
Payer: COMMERCIAL

## 2023-09-26 ENCOUNTER — LAB (OUTPATIENT)
Dept: LAB | Facility: HOSPITAL | Age: 33
End: 2023-09-26
Payer: COMMERCIAL

## 2023-09-26 ENCOUNTER — HOSPITAL ENCOUNTER (OUTPATIENT)
Dept: GENERAL RADIOLOGY | Facility: HOSPITAL | Age: 33
Discharge: HOME | End: 2023-09-26
Payer: COMMERCIAL

## 2023-09-26 VITALS
HEIGHT: 72 IN | TEMPERATURE: 98.1 F | WEIGHT: 293 LBS | DIASTOLIC BLOOD PRESSURE: 72 MMHG | OXYGEN SATURATION: 97 % | SYSTOLIC BLOOD PRESSURE: 118 MMHG | BODY MASS INDEX: 39.68 KG/M2 | HEART RATE: 68 BPM

## 2023-09-26 DIAGNOSIS — Z01.89 ROUTINE LAB DRAW: ICD-10-CM

## 2023-09-26 DIAGNOSIS — M54.2 NECK PAIN: ICD-10-CM

## 2023-09-26 DIAGNOSIS — E16.2 HYPOGLYCEMIA: ICD-10-CM

## 2023-09-26 DIAGNOSIS — N89.8 VAGINAL DISCHARGE: ICD-10-CM

## 2023-09-26 DIAGNOSIS — L70.0 ACNE VULGARIS: ICD-10-CM

## 2023-09-26 DIAGNOSIS — R53.83 FATIGUE, UNSPECIFIED TYPE: ICD-10-CM

## 2023-09-26 DIAGNOSIS — Z01.419 ENCOUNTER FOR ANNUAL ROUTINE GYNECOLOGICAL EXAMINATION: ICD-10-CM

## 2023-09-26 DIAGNOSIS — L73.2 HIDRADENITIS: ICD-10-CM

## 2023-09-26 DIAGNOSIS — E03.9 HYPOTHYROIDISM, UNSPECIFIED TYPE: ICD-10-CM

## 2023-09-26 DIAGNOSIS — N92.6 IRREGULAR MENSTRUAL BLEEDING: ICD-10-CM

## 2023-09-26 DIAGNOSIS — Z12.4 SCREENING FOR CERVICAL CANCER: ICD-10-CM

## 2023-09-26 DIAGNOSIS — L68.9 EXCESS BODY AND FACIAL HAIR: ICD-10-CM

## 2023-09-26 DIAGNOSIS — E28.2 PCOS (POLYCYSTIC OVARIAN SYNDROME): Primary | ICD-10-CM

## 2023-09-26 LAB
ALBUMIN SERPL-MCNC: 4.4 G/DL (ref 3.5–5.2)
ALBUMIN/GLOB SERPL: 1.6 G/DL
ALP SERPL-CCNC: 54 U/L (ref 39–117)
ALT SERPL W P-5'-P-CCNC: 17 U/L (ref 1–33)
ANION GAP SERPL CALCULATED.3IONS-SCNC: 12.1 MMOL/L (ref 5–15)
AST SERPL-CCNC: 15 U/L (ref 1–32)
BASOPHILS # BLD AUTO: 0.1 10*3/MM3 (ref 0–0.2)
BASOPHILS NFR BLD AUTO: 1 % (ref 0–1.5)
BILIRUB SERPL-MCNC: 0.3 MG/DL (ref 0–1.2)
BUN SERPL-MCNC: 12 MG/DL (ref 6–20)
BUN/CREAT SERPL: 11.9 (ref 7–25)
C TRACH RRNA CVX QL NAA+PROBE: NOT DETECTED
CALCIUM SPEC-SCNC: 9.7 MG/DL (ref 8.6–10.5)
CANDIDA SPECIES: NEGATIVE
CHLORIDE SERPL-SCNC: 101 MMOL/L (ref 98–107)
CO2 SERPL-SCNC: 24.9 MMOL/L (ref 22–29)
CREAT SERPL-MCNC: 1.01 MG/DL (ref 0.57–1)
DEPRECATED RDW RBC AUTO: 43.5 FL (ref 37–54)
EGFRCR SERPLBLD CKD-EPI 2021: 75.5 ML/MIN/1.73
EOSINOPHIL # BLD AUTO: 0.05 10*3/MM3 (ref 0–0.4)
EOSINOPHIL NFR BLD AUTO: 0.5 % (ref 0.3–6.2)
ERYTHROCYTE [DISTWIDTH] IN BLOOD BY AUTOMATED COUNT: 13 % (ref 12.3–15.4)
FOLATE SERPL-MCNC: 2.86 NG/ML (ref 4.78–24.2)
GARDNERELLA VAGINALIS: NEGATIVE
GLOBULIN UR ELPH-MCNC: 2.8 GM/DL
GLUCOSE SERPL-MCNC: 74 MG/DL (ref 65–99)
HBA1C MFR BLD: 4.9 % (ref 4.8–5.6)
HCT VFR BLD AUTO: 43.3 % (ref 34–46.6)
HGB BLD-MCNC: 14.9 G/DL (ref 12–15.9)
IMM GRANULOCYTES # BLD AUTO: 0.03 10*3/MM3 (ref 0–0.05)
IMM GRANULOCYTES NFR BLD AUTO: 0.3 % (ref 0–0.5)
LYMPHOCYTES # BLD AUTO: 2.04 10*3/MM3 (ref 0.7–3.1)
LYMPHOCYTES NFR BLD AUTO: 20.5 % (ref 19.6–45.3)
MCH RBC QN AUTO: 31.8 PG (ref 26.6–33)
MCHC RBC AUTO-ENTMCNC: 34.4 G/DL (ref 31.5–35.7)
MCV RBC AUTO: 92.3 FL (ref 79–97)
MONOCYTES # BLD AUTO: 0.49 10*3/MM3 (ref 0.1–0.9)
MONOCYTES NFR BLD AUTO: 4.9 % (ref 5–12)
N GONORRHOEA RRNA SPEC QL NAA+PROBE: NOT DETECTED
NEUTROPHILS NFR BLD AUTO: 7.22 10*3/MM3 (ref 1.7–7)
NEUTROPHILS NFR BLD AUTO: 72.8 % (ref 42.7–76)
NRBC BLD AUTO-RTO: 0 /100 WBC (ref 0–0.2)
PLATELET # BLD AUTO: 252 10*3/MM3 (ref 140–450)
PMV BLD AUTO: 9.9 FL (ref 6–12)
POTASSIUM SERPL-SCNC: 4 MMOL/L (ref 3.5–5.2)
PROLACTIN SERPL-MCNC: 5.19 NG/ML (ref 4.79–23.3)
PROT SERPL-MCNC: 7.2 G/DL (ref 6–8.5)
RBC # BLD AUTO: 4.69 10*6/MM3 (ref 3.77–5.28)
SODIUM SERPL-SCNC: 138 MMOL/L (ref 136–145)
T VAGINALIS DNA VAG QL PROBE+SIG AMP: NEGATIVE
TSH SERPL DL<=0.05 MIU/L-ACNC: 2.89 UIU/ML (ref 0.27–4.2)
VIT B12 BLD-MCNC: 393 PG/ML (ref 211–946)
WBC NRBC COR # BLD: 9.93 10*3/MM3 (ref 3.4–10.8)

## 2023-09-26 PROCEDURE — 84443 ASSAY THYROID STIM HORMONE: CPT

## 2023-09-26 PROCEDURE — 82627 DEHYDROEPIANDROSTERONE: CPT

## 2023-09-26 PROCEDURE — 87491 CHLMYD TRACH DNA AMP PROBE: CPT

## 2023-09-26 PROCEDURE — 72050 X-RAY EXAM NECK SPINE 4/5VWS: CPT

## 2023-09-26 PROCEDURE — 36415 COLL VENOUS BLD VENIPUNCTURE: CPT

## 2023-09-26 PROCEDURE — G0123 SCREEN CERV/VAG THIN LAYER: HCPCS

## 2023-09-26 PROCEDURE — 82607 VITAMIN B-12: CPT

## 2023-09-26 PROCEDURE — 87591 N.GONORRHOEAE DNA AMP PROB: CPT

## 2023-09-26 PROCEDURE — 87624 HPV HI-RISK TYP POOLED RSLT: CPT

## 2023-09-26 PROCEDURE — 80053 COMPREHEN METABOLIC PANEL: CPT

## 2023-09-26 PROCEDURE — 87660 TRICHOMONAS VAGIN DIR PROBE: CPT

## 2023-09-26 PROCEDURE — 84402 ASSAY OF FREE TESTOSTERONE: CPT

## 2023-09-26 PROCEDURE — 82746 ASSAY OF FOLIC ACID SERUM: CPT

## 2023-09-26 PROCEDURE — 87510 GARDNER VAG DNA DIR PROBE: CPT

## 2023-09-26 PROCEDURE — 84146 ASSAY OF PROLACTIN: CPT

## 2023-09-26 PROCEDURE — 85025 COMPLETE CBC W/AUTO DIFF WBC: CPT

## 2023-09-26 PROCEDURE — 87480 CANDIDA DNA DIR PROBE: CPT

## 2023-09-26 PROCEDURE — 83036 HEMOGLOBIN GLYCOSYLATED A1C: CPT

## 2023-09-26 RX ORDER — CHLORHEXIDINE GLUCONATE 4 G/100ML
1 SOLUTION TOPICAL DAILY
Qty: 473 ML | Refills: 12 | Status: SHIPPED | OUTPATIENT
Start: 2023-09-26

## 2023-09-26 RX ORDER — METFORMIN HYDROCHLORIDE 500 MG/1
500 TABLET, EXTENDED RELEASE ORAL
Qty: 90 TABLET | Refills: 1 | Status: SHIPPED | OUTPATIENT
Start: 2023-09-26

## 2023-09-26 RX ORDER — LANOLIN ALCOHOL/MO/W.PET/CERES
325 CREAM (GRAM) TOPICAL
COMMUNITY
Start: 2023-09-19

## 2023-09-26 RX ORDER — SPIRONOLACTONE 25 MG/1
25 TABLET ORAL DAILY
Qty: 90 TABLET | Refills: 1 | Status: SHIPPED | OUTPATIENT
Start: 2023-09-26

## 2023-09-26 NOTE — ASSESSMENT & PLAN NOTE
All the lab work was negative, based off of symptoms, do still think patient has PCOS.  We will go ahead and formally diagnose at this time.  We will start on metformin to help with insulin resistance, think this will help with her fluctuation of blood sugar as well.  We will start on spironolactone to help with excess androgen production and excess facial hair/cystic acne.  Patient currently wanting to get pregnant, so we will hold off on any hormone replacement such as birth control at this time.

## 2023-09-26 NOTE — PROGRESS NOTES
Meghan Lui presents to [facility] with complaints of irregular menstrual cycle, labile blood sugar, facial hair, neck pain, left arm weakness, and is also here for Pap smear.      History of Present Illness  This is a 33-year-old female who presents to clinic with complaints of irregular menstrual cycle, labile blood sugar, facial hair, neck pain, left arm weakness, and is also here for Pap smear.    Patient's last Pap smear was a few years ago, does have a history of abnormal Pap smears, has had a colposcopy in the past.  Patient also has irregular menstrual cycle, states that it has been this way ever since she had her daughter a few years ago.  Denies any vaginal discharge, no vaginal irritation, but does have some vaginal pain.  Patient also has complaints of excess facial hair, cystic acne, did work her up for PCOS in the past but blood work did come back negative.  Does have a history of ovarian cyst, extreme pain periodically with her menstrual cycles.    Patient also has complaints of some left-sided weakness, has had some issues with her neck in the past, has had degenerative disc disease with a bulging disc in her neck, thought that it was mainly just causing right-sided issues, but not left-sided.  Has not had imaging done in several years.  Denies any other symptoms.    Hypothyroidism/Hashimoto's: Remains on brand Synthroid and doing well.  Is due for repeat TSH.    Hydradenitis: Stable.  Does need a refill of her chlorhexidine gluconate.    Is due for blood work, will order.  Otherwise up-to-date on other preventative screenings.    The following portions of the patient's history were personally reviewed and updated as appropriate: allergies, current medications, past medical history, past surgical history, past family history, and past social history.       Objective   Vital Signs:   /72 (BP Location: Left arm, Patient Position: Sitting, Cuff Size: Adult)   " Pulse 68   Temp 98.1 °F (36.7 °C) (Temporal)   Ht 182 cm (71.65\")   Wt (!) 142 kg (312 lb)   SpO2 97%   BMI 42.73 kg/m²     Body mass index is 42.73 kg/m².    All labs, imaging, test results, and specialty provider notes reviewed with patient.     Physical Exam  Vitals reviewed.   Constitutional:       Appearance: Normal appearance.   Cardiovascular:      Rate and Rhythm: Normal rate and regular rhythm.      Pulses: Normal pulses.      Heart sounds: Normal heart sounds.   Pulmonary:      Effort: Pulmonary effort is normal.      Breath sounds: Normal breath sounds.   Chest:      Comments: Breast exam performed  Genitourinary:     Comments: Pap smear performed  Neurological:      General: No focal deficit present.      Mental Status: She is alert and oriented to person, place, and time.                             Assessment and Plan:  Diagnoses and all orders for this visit:    1. PCOS (polycystic ovarian syndrome) (Primary)  Assessment & Plan:  All the lab work was negative, based off of symptoms, do still think patient has PCOS.  We will go ahead and formally diagnose at this time.  We will start on metformin to help with insulin resistance, think this will help with her fluctuation of blood sugar as well.  We will start on spironolactone to help with excess androgen production and excess facial hair/cystic acne.  Patient currently wanting to get pregnant, so we will hold off on any hormone replacement such as birth control at this time.    Orders:  -     metFORMIN ER (GLUCOPHAGE-XR) 500 MG 24 hr tablet; Take 1 tablet by mouth Daily With Breakfast.  Dispense: 90 tablet; Refill: 1  -     spironolactone (Aldactone) 25 MG tablet; Take 1 tablet by mouth Daily.  Dispense: 90 tablet; Refill: 1    2. Screening for cervical cancer  -     Chlamydia trachomatis, Neisseria gonorrhoeae, PCR - , Cervix; Future  -     IgP, Aptima HPV; Future  -     Gardnerella vaginalis, Trichomonas vaginalis, Candida albicans, DNA - " Swab, Vagina; Future  -     Gardnerella vaginalis, Trichomonas vaginalis, Candida albicans, DNA - Swab, Vagina  -     Chlamydia trachomatis, Neisseria gonorrhoeae, PCR - , Cervix  -     IgP, Aptima HPV    3. Vaginal discharge  -     Chlamydia trachomatis, Neisseria gonorrhoeae, PCR - , Cervix; Future  -     IgP, Aptima HPV; Future  -     Gardnerella vaginalis, Trichomonas vaginalis, Candida albicans, DNA - Swab, Vagina; Future  -     Gardnerella vaginalis, Trichomonas vaginalis, Candida albicans, DNA - Swab, Vagina  -     Chlamydia trachomatis, Neisseria gonorrhoeae, PCR - , Cervix  -     IgP, Aptima HPV    4. Encounter for annual routine gynecological examination  -     Chlamydia trachomatis, Neisseria gonorrhoeae, PCR - , Cervix; Future  -     IgP, Aptima HPV; Future  -     Gardnerella vaginalis, Trichomonas vaginalis, Candida albicans, DNA - Swab, Vagina; Future  -     Gardnerella vaginalis, Trichomonas vaginalis, Candida albicans, DNA - Swab, Vagina  -     Chlamydia trachomatis, Neisseria gonorrhoeae, PCR - , Cervix  -     IgP, Aptima HPV    5. Neck pain  Comments:  Likely because of left-sided weakness, obtain x-ray.  Further work-up done at that time.  Consider PT/referral.  Orders:  -     XR Spine Cervical Complete 4 or 5 View; Future    6. Routine lab draw  -     CBC Auto Differential; Future  -     Comprehensive Metabolic Panel; Future    7. Hidradenitis  Assessment & Plan:  Stable.  Continue with chlorhexidine.      8. Hypoglycemia  -     Hemoglobin A1c; Future    9. Irregular menstrual bleeding  -     Prolactin; Future  -     Testosterone Free Direct; Future  -     DHEA-sulfate; Future    10. Excess body and facial hair  -     Prolactin; Future  -     Testosterone Free Direct; Future  -     DHEA-sulfate; Future  -     spironolactone (Aldactone) 25 MG tablet; Take 1 tablet by mouth Daily.  Dispense: 90 tablet; Refill: 1    11. Acne vulgaris  -     spironolactone (Aldactone) 25 MG tablet; Take 1 tablet by  mouth Daily.  Dispense: 90 tablet; Refill: 1    12. Hypothyroidism, unspecified type  Comments:  Repeat TSH, consider adjustment of Synthroid if needed.  Orders:  -     TSH Rfx On Abnormal To Free T4; Future    13. Fatigue, unspecified type  -     Vitamin B12 & Folate; Future    Other orders  -     Chlorhexidine Gluconate (Latoya-Hex 4) 4 % solution; Apply 1 application  topically to the appropriate area as directed Daily.  Dispense: 473 mL; Refill: 12          Follow Up:  No follow-ups on file.    Patient was given instructions and counseling regarding her condition or for health maintenance advice. Please see specific information pulled into the AVS if appropriate.

## 2023-09-27 LAB — DHEA-S SERPL-MCNC: 55 UG/DL (ref 84.8–378)

## 2023-09-29 LAB
CYTOLOGIST CVX/VAG CYTO: NORMAL
CYTOLOGY CVX/VAG DOC CYTO: NORMAL
CYTOLOGY CVX/VAG DOC THIN PREP: NORMAL
DX ICD CODE: NORMAL
HIV 1 & 2 AB SER-IMP: NORMAL
HPV I/H RISK 4 DNA CVX QL PROBE+SIG AMP: NEGATIVE
OTHER STN SPEC: NORMAL
STAT OF ADQ CVX/VAG CYTO-IMP: NORMAL

## 2023-10-03 DIAGNOSIS — K58.2 IRRITABLE BOWEL SYNDROME WITH BOTH CONSTIPATION AND DIARRHEA: ICD-10-CM

## 2023-10-03 RX ORDER — LAMOTRIGINE 200 MG/1
200 TABLET ORAL 2 TIMES DAILY
Qty: 90 TABLET | Refills: 1 | Status: SHIPPED | OUTPATIENT
Start: 2023-10-03

## 2023-10-03 RX ORDER — DICYCLOMINE HYDROCHLORIDE 10 MG/1
10 CAPSULE ORAL
Qty: 120 CAPSULE | Refills: 2 | Status: SHIPPED | OUTPATIENT
Start: 2023-10-03

## 2023-10-04 ENCOUNTER — OFFICE VISIT (OUTPATIENT)
Dept: SLEEP MEDICINE | Facility: HOSPITAL | Age: 33
End: 2023-10-04
Payer: COMMERCIAL

## 2023-10-04 VITALS
SYSTOLIC BLOOD PRESSURE: 128 MMHG | HEART RATE: 65 BPM | DIASTOLIC BLOOD PRESSURE: 73 MMHG | HEIGHT: 72 IN | WEIGHT: 293 LBS | OXYGEN SATURATION: 95 % | BODY MASS INDEX: 39.68 KG/M2

## 2023-10-04 DIAGNOSIS — R06.81 WITNESSED APNEIC SPELLS: ICD-10-CM

## 2023-10-04 DIAGNOSIS — R29.818 SUSPECTED SLEEP APNEA: Primary | ICD-10-CM

## 2023-10-04 DIAGNOSIS — G25.81 RESTLESS LEGS SYNDROME (RLS): ICD-10-CM

## 2023-10-04 DIAGNOSIS — G47.63 SLEEP-RELATED BRUXISM: ICD-10-CM

## 2023-10-04 DIAGNOSIS — R06.83 SNORING: ICD-10-CM

## 2023-10-04 DIAGNOSIS — G47.10 HYPERSOMNIA: ICD-10-CM

## 2023-10-04 PROCEDURE — G0463 HOSPITAL OUTPT CLINIC VISIT: HCPCS

## 2023-10-04 RX ORDER — DOXEPIN HYDROCHLORIDE 3 MG/1
TABLET ORAL
COMMUNITY

## 2023-10-04 RX ORDER — BACLOFEN 10 MG/1
TABLET ORAL
COMMUNITY

## 2023-10-04 RX ORDER — PRAZOSIN HYDROCHLORIDE 5 MG/1
CAPSULE ORAL
COMMUNITY
Start: 2023-09-29

## 2023-10-05 LAB — TESTOST FREE SERPL-MCNC: 0.2 PG/ML (ref 0–4.2)

## 2023-10-12 ENCOUNTER — HOSPITAL ENCOUNTER (OUTPATIENT)
Dept: SLEEP MEDICINE | Facility: HOSPITAL | Age: 33
End: 2023-10-12
Payer: COMMERCIAL

## 2023-10-12 DIAGNOSIS — G25.81 RESTLESS LEGS SYNDROME (RLS): ICD-10-CM

## 2023-10-12 DIAGNOSIS — G47.10 HYPERSOMNIA: ICD-10-CM

## 2023-10-12 DIAGNOSIS — G47.63 SLEEP-RELATED BRUXISM: ICD-10-CM

## 2023-10-12 DIAGNOSIS — R06.83 SNORING: ICD-10-CM

## 2023-10-12 DIAGNOSIS — R29.818 SUSPECTED SLEEP APNEA: ICD-10-CM

## 2023-10-12 PROCEDURE — 95806 SLEEP STUDY UNATT&RESP EFFT: CPT

## 2023-10-16 DIAGNOSIS — M51.36 DDD (DEGENERATIVE DISC DISEASE), LUMBAR: ICD-10-CM

## 2023-10-16 DIAGNOSIS — R60.0 LOWER EXTREMITY EDEMA: ICD-10-CM

## 2023-10-16 DIAGNOSIS — J90 PLEURAL EFFUSION: ICD-10-CM

## 2023-10-16 RX ORDER — HYDROCHLOROTHIAZIDE 12.5 MG/1
TABLET ORAL
Qty: 30 TABLET | Refills: 0 | Status: SHIPPED | OUTPATIENT
Start: 2023-10-16

## 2023-10-24 ENCOUNTER — TELEPHONE (OUTPATIENT)
Dept: SLEEP MEDICINE | Facility: HOSPITAL | Age: 33
End: 2023-10-24
Payer: COMMERCIAL

## 2023-10-24 DIAGNOSIS — G47.10 HYPERSOMNIA: Primary | ICD-10-CM

## 2023-10-24 RX ORDER — ZOLPIDEM TARTRATE 10 MG/1
5 TABLET ORAL NIGHTLY PRN
Qty: 2 TABLET | Refills: 0 | Status: SHIPPED | OUTPATIENT
Start: 2023-10-24 | End: 2023-10-28

## 2023-10-30 RX ORDER — LANOLIN ALCOHOL/MO/W.PET/CERES
1 CREAM (GRAM) TOPICAL
Qty: 90 TABLET | Refills: 1 | Status: SHIPPED | OUTPATIENT
Start: 2023-10-30

## 2023-11-22 DIAGNOSIS — J90 PLEURAL EFFUSION: ICD-10-CM

## 2023-11-22 DIAGNOSIS — R60.0 LOWER EXTREMITY EDEMA: ICD-10-CM

## 2023-11-22 RX ORDER — HYDROCHLOROTHIAZIDE 12.5 MG/1
TABLET ORAL
Qty: 30 TABLET | Refills: 0 | Status: SHIPPED | OUTPATIENT
Start: 2023-11-22

## 2023-11-22 NOTE — PROGRESS NOTES
Sleep Consultation    Patient Name: Meghan Lui  Age/Sex: 33 y.o. female  : 1990  MRN: [id number]    Date of Encounter Visit: 10/04/2023  Encounter Provider: Guy Craig MD  Referring Provider: DARINEL Soriano  Place of Service: [facility]  Patient Care Team:  Dionna Valenzuela APRN as PCP - General (Nurse Practitioner)    Subjective:     Reason for Consult: JACINTO    History of Present Illness:  Meghan Lui is a 33 y.o. female is here for re-evaluation of JACINTO  Patient had a prior sleep study but No CPAP prescribed back in , according to the patient , that study was inconclusive .  Since then she thinks she may have gained 30 pounds     Patient complains of daytime fatigue and sleepiness with an [place] Sleepiness Scale (ESS) of 10.  Patient complains of restless leg syndrome, daytime fatigue and sleepiness, worsening symptoms with weight gain, loud snoring with witnessed apnea waking up gasping for breath and having a sore throat in the morning.  Patient has also some frequent leg jerking at night and symptoms that suggest  and also has nocturnal bruxism  Patient denies any cataplexy, sleep paralysis or other symptoms to suggest narcolepsy.  Patient denies any parasomnias.  Denies any history of seizure disorder or recent head trauma.  Patient spends adequate amount of time in bed with no evidence of sleep restriction or improper sleep hygiene.  Bedtime.  830 to 9 PM, wake up time is 6 in the morning, with 5 hours of sleep in between, sleep onset within 30 minutes.  Caffeine intake is usually 1 caffeinated beverage per day, patient is a former smoker, no alcohol or illicit substance abuse  Comorbidities include: Hypertension, anxiety and depression, migraine difficulty urinating, ear pain, neck pain, lower extremities edema, shortness of breath and cough, dizziness anxiety and depression, dysphagia abdominal bloating, excessive thirst with extreme  temperature intolerance, hypothyroidism, bipolar disorder, chondromalacia    Review of Systems:   A twelve-system review was conducted and was negative except as discussed above.        Past Medical History:  Past Medical History:   Diagnosis Date    Anemia 2023    Anxiety     Arthritis     Asthma     Bladder problem     Cholelithiasis     Removed 2011    Condition not found     HERNIATED DISC (UNSPECIFIED)     Depression     Foot pain, left 2019    Heel pain     HL (hearing loss)     Hx of blood diseases     Hx of degenerative disc disease     Hyperlipidemia     Hypertension     Hypothyroidism     Ingrown toenail     Irritable bowel syndrome     Leg pain     Leg swelling     Lumbago     Migraines     Mood disorder     Muscle cramps     Neuromuscular disorder 2018    Numbness in feet     Obesity     Thyroid disease     Visual impairment        Past Surgical History:   Procedure Laterality Date     SECTION      , ,     CHOLECYSTECTOMY      DILATION AND CURETTAGE, DIAGNOSTIC / THERAPEUTIC      WISDOM TOOTH EXTRACTION         Home Medications:     Current Outpatient Medications:     baclofen (LIORESAL) 10 MG tablet, Take 1 tablet 3 times a day by oral route as needed for 30 days., Disp: , Rfl:     Chlorhexidine Gluconate (Latoay-Hex 4) 4 % solution, Apply 1 application  topically to the appropriate area as directed Daily., Disp: 473 mL, Rfl: 12    clindamycin (CLEOCIN T) 1 % external solution, clindamycin phosphate 1 % topical solution, Disp: , Rfl:     diclofenac (VOLTAREN) 50 MG EC tablet, TAKE ONE TABLET BY MOUTH TWICE A DAY, Disp: 60 tablet, Rfl: 2    dicyclomine (BENTYL) 10 MG capsule, Take 1 capsule by mouth 4 (Four) Times a Day Before Meals & at Bedtime., Disp: 120 capsule, Rfl: 2    Doxepin HCl 3 MG tablet, , Disp: , Rfl:     ferrous sulfate 325 (65 FE) MG EC tablet, Take 1 tablet by mouth Daily With Breakfast., Disp: , Rfl:     gabapentin (NEURONTIN) 800 MG  tablet, 1 tablet 2 (Two) Times a Day., Disp: , Rfl:     hydroCHLOROthiazide (HYDRODIURIL) 12.5 MG tablet, TAKE ONE TABLET BY MOUTH ONCE DAILY, Disp: 30 tablet, Rfl: 0    HYDROcodone-acetaminophen (NORCO) 7.5-325 MG per tablet, Take 2 tablets by mouth Every 4 (Four) Hours As Needed., Disp: , Rfl:     hydrOXYzine pamoate (VISTARIL) 50 MG capsule, Take 1 capsule by mouth 3 (Three) Times a Day As Needed., Disp: , Rfl:     lamoTRIgine (LaMICtal) 200 MG tablet, Take 1 tablet by mouth 2 (Two) Times a Day., Disp: 90 tablet, Rfl: 1    lidocaine (XYLOCAINE) 2 % jelly, Apply  topically to the appropriate area as directed As Needed for Mild Pain., Disp: 28 g, Rfl: 0    lisinopril (PRINIVIL,ZESTRIL) 5 MG tablet, TAKE ONE TABLET BY MOUTH EVERY DAY, Disp: 90 tablet, Rfl: 1    metFORMIN ER (GLUCOPHAGE-XR) 500 MG 24 hr tablet, Take 1 tablet by mouth Daily With Breakfast., Disp: 90 tablet, Rfl: 1    prazosin (MINIPRESS) 5 MG capsule, , Disp: , Rfl:     spironolactone (Aldactone) 25 MG tablet, Take 1 tablet by mouth Daily., Disp: 90 tablet, Rfl: 1    Synthroid 137 MCG tablet, Take 1 tablet by mouth Daily., Disp: 90 tablet, Rfl: 1    triamcinolone (KENALOG) 0.1 % ointment, triamcinolone acetonide 0.1 % topical ointment, Disp: , Rfl:     Allergies:  Allergies   Allergen Reactions    Remeron [Mirtazapine] Other (See Comments)     Suicidal ideation    Mushroom GI Intolerance    Buspirone Mental Status Change     Suicidal ideation       Past Social History:  Social History     Socioeconomic History    Marital status: Single   Tobacco Use    Smoking status: Former     Packs/day: 0.50     Years: 16.00     Pack years: 8.00     Types: Cigarettes     Quit date: 2021     Years since quittin.2    Smokeless tobacco: Never   Vaping Use    Vaping Use: Every day    Start date: 7/15/2021    Substances: Nicotine, Flavoring    Devices: Disposable   Substance and Sexual Activity    Alcohol use: Not Currently     Comment: CURRENT SOME DAY (rare)  "   Drug use: Never    Sexual activity: Yes     Partners: Male     Birth control/protection: None       Past Family History:  Family History   Problem Relation Age of Onset    Diabetes Mother     Hypertension Mother     Vision loss Mother     Thyroid disease Mother     Obesity Mother     Cancer Father         Passed 2023    Early death Father         cancer    Thyroid disease Father     Arthritis Sister     Obesity Maternal Aunt     Obesity Maternal Uncle     Arthritis Paternal Grandmother     Cancer Paternal Grandmother     Cancer Paternal Grandfather     COPD Paternal Grandfather      999  Objective:        Vital Signs:   Visit Vitals  /73   Pulse 65   Ht 182 cm (71.65\")   Wt (!) 142 kg (313 lb 4.8 oz)   SpO2 95%   BMI 42.91 kg/m²     Wt Readings from Last 3 Encounters:   10/04/23 (!) 142 kg (313 lb 4.8 oz)   09/26/23 (!) 142 kg (312 lb)   09/14/23 (!) 142 kg (312 lb)     Neck Circumference: 14.5 inches    Physical Exam:   GEN:  No acute distress, alert, cooperative, well developed, obese  EYES:   Sclerae clear. No icterus. PERRL. Normal EOM  ENT:   External ears/nose normal, no oral lesions, no thrush, mucous membranes moist, Septum midline. Mallampati II airway.    NECK:  Supple, midline trachea, no JVD  LUNGS: Normal chest on inspection, CTAB, no wheezes. No rhonchi. No crackles. Respirations regular, even and unlabored.   CV:  Regular rhythm and rate. Normal S1/S2. No murmurs, gallops, or rubs noted.  ABD:  Soft, nontender and nondistended. Normal bowel sounds. No guarding  EXT:  Moves all extremities well. No cyanosis. No redness. No edema.   Skin: Dry, intact, no bleeding      Diagnostic Data:  No prior study to review     Assessment and Plan:       ICD-10-CM ICD-9-CM   1. Suspected sleep apnea  R29.818 781.99   2. Hypersomnia  G47.10 780.54   3. Restless legs syndrome (RLS)  G25.81 333.94   4. Sleep-related bruxism  G47.63 327.53   5. Snoring  R06.83 786.09   6. Witnessed apneic spells  R06.81 786.03 "       Recommendations:     We discussed the diagnostic testing and patient preferred to go home sleep study because she could not sleep at all in the sleep lab.  She is agreeable for CPAP treatment we did discuss that in details  As for the restless leg syndrome, patient has typical symptoms but we will consider that if her symptoms persist despite treating the sleep apnea, she does not seem to have significant problem with insomnia to suggest starting pharmacological therapy at this point    Patient was educated in depth about JACINTO and cardiovascular consequences if left untreated, including but not limited to CHF, CAD, arrhythmias, CVA, and/or HTN. Education also provided about the diagnostic tools for JACINTO, including the polysomnography and the treatment modalities, including the CPAP.     If patient has obstructive sleep apnea the recommend treatment is CPAP and will start CPAP and patient will follow up within 31-90 days after starting CPAP for compliance review.   Will address alternative treatment option if intolerant to CPAP     Adherence to the CPAP is a key factor in successful treatment of JACINTO and the patient was encouraged to contact us in case of problem with the CPAP or the mask that can limit the tolerance of the compliance with the therapy.    Patient was educated about the impact of obesity on sleep apnea and the benefit of weight loss and weight loss was recommended    Orders Placed This Encounter   Procedures    Home Sleep Study     No orders of the defined types were placed in this encounter.     Return in about 3 months (around 1/4/2024).    Guy Craig MD   [facility]   10/04/23  10:43 EDT    Dictated utilizing Dragon dictation           dependent (1 to 1)

## 2023-12-07 ENCOUNTER — OFFICE VISIT (OUTPATIENT)
Dept: FAMILY MEDICINE CLINIC | Facility: CLINIC | Age: 33
End: 2023-12-07
Payer: COMMERCIAL

## 2023-12-07 VITALS
WEIGHT: 293 LBS | HEIGHT: 72 IN | SYSTOLIC BLOOD PRESSURE: 108 MMHG | OXYGEN SATURATION: 98 % | HEART RATE: 77 BPM | TEMPERATURE: 97.9 F | BODY MASS INDEX: 39.68 KG/M2 | DIASTOLIC BLOOD PRESSURE: 68 MMHG

## 2023-12-07 DIAGNOSIS — B34.9 VIRAL ILLNESS: Primary | ICD-10-CM

## 2023-12-07 DIAGNOSIS — R42 DIZZINESS: ICD-10-CM

## 2023-12-07 DIAGNOSIS — A09 DIARRHEA OF INFECTIOUS ORIGIN: ICD-10-CM

## 2023-12-07 DIAGNOSIS — R11.2 NAUSEA AND VOMITING, UNSPECIFIED VOMITING TYPE: ICD-10-CM

## 2023-12-07 LAB
EXPIRATION DATE: NORMAL
HBA1C MFR BLD: 4.9 % (ref 4.5–5.7)
Lab: NORMAL

## 2023-12-07 RX ORDER — ONDANSETRON 4 MG/1
4 TABLET, ORALLY DISINTEGRATING ORAL EVERY 8 HOURS PRN
Qty: 30 TABLET | Refills: 0 | Status: SHIPPED | OUTPATIENT
Start: 2023-12-07

## 2023-12-07 NOTE — PROGRESS NOTES
"Meghan Lui presents to [facility] with complaints of dizziness, nausea, diarrhea, chills.      History of Present Illness  This is a 33-year-old female who presents to the clinic with complaints of dizziness, nausea, diarrhea and chills.    Patient states that she started feeling really bad yesterday while at work, states that she felt really dizzy, really shaky, and just overall felt awful.  Patient states that she has been in contact with a few people at work who have been ill, she also works at a school.  Patient states that she is concerned of her blood sugar, states that she felt this bad the last time her blood sugar was off, it is not sure if that is the cause of this or not.  Patient states that she has had chills, just cannot get warm, but also has hot sweats as well.  Has had profuse diarrhea, has been very nauseous and has a hard time keeping any food down.  Is also just not been hungry.  States that she may have had a fever, just does not have a thermometer to check.  Denies any other symptoms.    The following portions of the patient's history were personally reviewed and updated as appropriate: allergies, current medications, past medical history, past surgical history, past family history, and past social history.       Objective   Vital Signs:   /68 (BP Location: Left arm, Patient Position: Sitting, Cuff Size: Adult)   Pulse 77   Temp 97.9 °F (36.6 °C) (Temporal)   Ht 182 cm (71.65\")   Wt (!) 138 kg (304 lb 1.6 oz)   SpO2 98%   BMI 41.65 kg/m²     Body mass index is 41.65 kg/m².    All labs, imaging, test results, and specialty provider notes reviewed with patient.     Physical Exam  Vitals reviewed.   Constitutional:       Appearance: Normal appearance.   Cardiovascular:      Rate and Rhythm: Normal rate and regular rhythm.      Pulses: Normal pulses.      Heart sounds: Normal heart sounds.   Pulmonary:      Effort: Pulmonary effort is normal.     "  Breath sounds: Normal breath sounds.   Neurological:      General: No focal deficit present.      Mental Status: She is alert and oriented to person, place, and time.                Assessment and Plan:  Diagnoses and all orders for this visit:    1. Viral illness (Primary)    2. Nausea and vomiting, unspecified vomiting type  -     ondansetron ODT (ZOFRAN-ODT) 4 MG disintegrating tablet; Place 1 tablet on the tongue Every 8 (Eight) Hours As Needed for Nausea or Vomiting.  Dispense: 30 tablet; Refill: 0    3. Diarrhea of infectious origin    4. Dizziness  -     POC Glycosylated Hemoglobin (Hb A1C)      Did check blood sugar just to rule out any cause there, A1c was normal at 4.9% and blood sugar was at 82.  Do think that this is a GI bug, discussed with him that it will run its course, will keep patient off from work until Monday.  Will provide Zofran to assist with the nausea.  Make sure that she stays very hydrated, tries to eat bland foods.    Follow Up:  No follow-ups on file.    Patient was given instructions and counseling regarding her condition or for health maintenance advice. Please see specific information pulled into the AVS if appropriate.

## 2023-12-14 DIAGNOSIS — K58.2 IRRITABLE BOWEL SYNDROME WITH BOTH CONSTIPATION AND DIARRHEA: ICD-10-CM

## 2023-12-14 DIAGNOSIS — J90 PLEURAL EFFUSION: ICD-10-CM

## 2023-12-14 DIAGNOSIS — R60.0 LOWER EXTREMITY EDEMA: ICD-10-CM

## 2023-12-14 RX ORDER — HYDROCHLOROTHIAZIDE 12.5 MG/1
TABLET ORAL
Qty: 90 TABLET | Refills: 3 | Status: SHIPPED | OUTPATIENT
Start: 2023-12-14

## 2023-12-14 RX ORDER — DICYCLOMINE HYDROCHLORIDE 10 MG/1
10 CAPSULE ORAL
Qty: 120 CAPSULE | Refills: 2 | Status: SHIPPED | OUTPATIENT
Start: 2023-12-14

## 2023-12-26 ENCOUNTER — OFFICE VISIT (OUTPATIENT)
Dept: FAMILY MEDICINE CLINIC | Facility: CLINIC | Age: 33
End: 2023-12-26
Payer: COMMERCIAL

## 2023-12-26 VITALS
OXYGEN SATURATION: 99 % | BODY MASS INDEX: 39.68 KG/M2 | HEIGHT: 72 IN | SYSTOLIC BLOOD PRESSURE: 102 MMHG | WEIGHT: 293 LBS | TEMPERATURE: 98.4 F | DIASTOLIC BLOOD PRESSURE: 58 MMHG | HEART RATE: 75 BPM

## 2023-12-26 DIAGNOSIS — L70.0 ACNE VULGARIS: ICD-10-CM

## 2023-12-26 DIAGNOSIS — E28.2 PCOS (POLYCYSTIC OVARIAN SYNDROME): Primary | ICD-10-CM

## 2023-12-26 DIAGNOSIS — E66.01 MORBID (SEVERE) OBESITY DUE TO EXCESS CALORIES: ICD-10-CM

## 2023-12-26 DIAGNOSIS — L68.9 EXCESS BODY AND FACIAL HAIR: ICD-10-CM

## 2023-12-26 NOTE — ASSESSMENT & PLAN NOTE
Patient to follow-up with bariatric surgery in regards to possible bariatric sleeve.  Will continue with other treatment of chronic conditions with metformin.  Patient to continue lifestyle modifications to include diet adjustments and increased exercise.

## 2023-12-26 NOTE — PROGRESS NOTES
"Meghan Lui presents to [facility] who presents to the neck for 3-month follow-up.      History of Present Illness  This is a 33-year-old female who presents to the clinic for 3-month follow-up.    Obesity: Patient states that she has now completed the 6-month requirements for psychiatry to have the gastric sleeve procedure via bariatric surgery.  Patient states that she is get a call, she does know that she needs to do weight checks and some other things via her PCP, so is going to get with them to see what other forms need to be completed.  States that she is trying really hard, trying to adjust her diet, trying to increase her exercise as able, but does feel like this will be able to help her further.    PCOS: Patient states that she is tolerating the metformin well, and tolerating the spironolactone well.  States that it is helping with her facial hair, helping some with her acne, and does feel like the metformin is helping in some ways as well.  Overall doing well on these medications, but would ultimately like to come off all medications if able after getting the bariatric surgery completed.    Patient states that she also was able to get over her stomach virus that she had recently, has not had any residual effects, and doing is doing well.    The following portions of the patient's history were personally reviewed and updated as appropriate: allergies, current medications, past medical history, past surgical history, past family history, and past social history.       Objective   Vital Signs:   /58 (BP Location: Left arm, Patient Position: Sitting, Cuff Size: Adult)   Pulse 75   Temp 98.4 °F (36.9 °C) (Temporal)   Ht 182 cm (71.65\")   Wt (!) 141 kg (311 lb 4.8 oz)   SpO2 99%   BMI 42.63 kg/m²     Body mass index is 42.63 kg/m².    All labs, imaging, test results, and specialty provider notes reviewed with patient.     Physical Exam  Vitals reviewed. "   Constitutional:       Appearance: Normal appearance.   Cardiovascular:      Rate and Rhythm: Normal rate and regular rhythm.      Pulses: Normal pulses.      Heart sounds: Normal heart sounds.   Pulmonary:      Effort: Pulmonary effort is normal.      Breath sounds: Normal breath sounds.   Neurological:      General: No focal deficit present.      Mental Status: She is alert and oriented to person, place, and time.                               Assessment and Plan:  Diagnoses and all orders for this visit:    1. PCOS (polycystic ovarian syndrome) (Primary)  Assessment & Plan:  Doing well and tolerating metformin and spironolactone well, will continue on these medications.      2. Excess body and facial hair    3. Acne vulgaris    4. Morbid (severe) obesity due to excess calories  Assessment & Plan:  Patient to follow-up with bariatric surgery in regards to possible bariatric sleeve.  Will continue with other treatment of chronic conditions with metformin.  Patient to continue lifestyle modifications to include diet adjustments and increased exercise.            Follow Up:  Return in about 6 months (around 6/26/2024).    Patient was given instructions and counseling regarding her condition or for health maintenance advice. Please see specific information pulled into the AVS if appropriate.

## 2024-01-04 ENCOUNTER — TELEPHONE (OUTPATIENT)
Dept: BARIATRICS/WEIGHT MGMT | Facility: CLINIC | Age: 34
End: 2024-01-04
Payer: COMMERCIAL

## 2024-01-04 NOTE — TELEPHONE ENCOUNTER
patient to go over remaining requirements for bariatric process  Regarding surgical & insurance requirements let patient know that still have some surgical & insurance requirements that are needed  to be met before we can submit to insurance for approval. Reviewed the following requirements ; needs cardac  clearance updated labs  psych & bsf

## 2024-01-11 DIAGNOSIS — M51.36 DDD (DEGENERATIVE DISC DISEASE), LUMBAR: ICD-10-CM

## 2024-01-18 ENCOUNTER — OFFICE VISIT (OUTPATIENT)
Dept: FAMILY MEDICINE CLINIC | Facility: CLINIC | Age: 34
End: 2024-01-18
Payer: COMMERCIAL

## 2024-01-18 ENCOUNTER — LAB (OUTPATIENT)
Dept: LAB | Facility: HOSPITAL | Age: 34
End: 2024-01-18
Payer: COMMERCIAL

## 2024-01-18 VITALS
TEMPERATURE: 99 F | BODY MASS INDEX: 39.68 KG/M2 | DIASTOLIC BLOOD PRESSURE: 72 MMHG | HEART RATE: 77 BPM | HEIGHT: 72 IN | OXYGEN SATURATION: 98 % | WEIGHT: 293 LBS | SYSTOLIC BLOOD PRESSURE: 118 MMHG

## 2024-01-18 DIAGNOSIS — M54.2 NECK PAIN: ICD-10-CM

## 2024-01-18 DIAGNOSIS — R53.1 WEAKNESS: Primary | ICD-10-CM

## 2024-01-18 DIAGNOSIS — Z01.89 ROUTINE LAB DRAW: ICD-10-CM

## 2024-01-18 DIAGNOSIS — G43.E01 CHRONIC MIGRAINE WITH AURA AND WITH STATUS MIGRAINOSUS, NOT INTRACTABLE: ICD-10-CM

## 2024-01-18 DIAGNOSIS — M25.50 ARTHRALGIA, UNSPECIFIED JOINT: ICD-10-CM

## 2024-01-18 DIAGNOSIS — R20.0 NUMBNESS AND TINGLING: ICD-10-CM

## 2024-01-18 DIAGNOSIS — R53.83 FATIGUE, UNSPECIFIED TYPE: ICD-10-CM

## 2024-01-18 DIAGNOSIS — R20.2 NUMBNESS AND TINGLING: ICD-10-CM

## 2024-01-18 LAB
ALBUMIN SERPL-MCNC: 4.3 G/DL (ref 3.5–5.2)
ALBUMIN/GLOB SERPL: 1.7 G/DL
ALP SERPL-CCNC: 64 U/L (ref 39–117)
ALT SERPL W P-5'-P-CCNC: 15 U/L (ref 1–33)
ANION GAP SERPL CALCULATED.3IONS-SCNC: 10 MMOL/L (ref 5–15)
ASO AB SERPL-ACNC: POSITIVE [IU]/ML
AST SERPL-CCNC: 13 U/L (ref 1–32)
BASOPHILS # BLD AUTO: 0.08 10*3/MM3 (ref 0–0.2)
BASOPHILS NFR BLD AUTO: 1 % (ref 0–1.5)
BILIRUB SERPL-MCNC: 0.2 MG/DL (ref 0–1.2)
BUN SERPL-MCNC: 10 MG/DL (ref 6–20)
BUN/CREAT SERPL: 8.3 (ref 7–25)
CALCIUM SPEC-SCNC: 10 MG/DL (ref 8.6–10.5)
CHLORIDE SERPL-SCNC: 102 MMOL/L (ref 98–107)
CHROMATIN AB SERPL-ACNC: <10 IU/ML (ref 0–14)
CK SERPL-CCNC: 51 U/L (ref 20–180)
CO2 SERPL-SCNC: 28 MMOL/L (ref 22–29)
CREAT SERPL-MCNC: 1.21 MG/DL (ref 0.57–1)
CRP SERPL-MCNC: 0.9 MG/DL (ref 0–0.5)
DEPRECATED RDW RBC AUTO: 40.6 FL (ref 37–54)
EGFRCR SERPLBLD CKD-EPI 2021: 60.8 ML/MIN/1.73
EOSINOPHIL # BLD AUTO: 0.1 10*3/MM3 (ref 0–0.4)
EOSINOPHIL NFR BLD AUTO: 1.2 % (ref 0.3–6.2)
ERYTHROCYTE [DISTWIDTH] IN BLOOD BY AUTOMATED COUNT: 12.3 % (ref 12.3–15.4)
ERYTHROCYTE [SEDIMENTATION RATE] IN BLOOD: 6 MM/HR (ref 0–20)
FOLATE SERPL-MCNC: <2 NG/ML (ref 4.78–24.2)
GLOBULIN UR ELPH-MCNC: 2.5 GM/DL
GLUCOSE SERPL-MCNC: 74 MG/DL (ref 65–99)
HCT VFR BLD AUTO: 40.9 % (ref 34–46.6)
HGB BLD-MCNC: 13.8 G/DL (ref 12–15.9)
IMM GRANULOCYTES # BLD AUTO: 0.02 10*3/MM3 (ref 0–0.05)
IMM GRANULOCYTES NFR BLD AUTO: 0.2 % (ref 0–0.5)
LYMPHOCYTES # BLD AUTO: 2.05 10*3/MM3 (ref 0.7–3.1)
LYMPHOCYTES NFR BLD AUTO: 25.1 % (ref 19.6–45.3)
MCH RBC QN AUTO: 30.7 PG (ref 26.6–33)
MCHC RBC AUTO-ENTMCNC: 33.7 G/DL (ref 31.5–35.7)
MCV RBC AUTO: 90.9 FL (ref 79–97)
MONOCYTES # BLD AUTO: 0.41 10*3/MM3 (ref 0.1–0.9)
MONOCYTES NFR BLD AUTO: 5 % (ref 5–12)
NEUTROPHILS NFR BLD AUTO: 5.5 10*3/MM3 (ref 1.7–7)
NEUTROPHILS NFR BLD AUTO: 67.5 % (ref 42.7–76)
NRBC BLD AUTO-RTO: 0 /100 WBC (ref 0–0.2)
PLATELET # BLD AUTO: 301 10*3/MM3 (ref 140–450)
PMV BLD AUTO: 9.8 FL (ref 6–12)
POTASSIUM SERPL-SCNC: 4.1 MMOL/L (ref 3.5–5.2)
PROT SERPL-MCNC: 6.8 G/DL (ref 6–8.5)
RBC # BLD AUTO: 4.5 10*6/MM3 (ref 3.77–5.28)
SODIUM SERPL-SCNC: 140 MMOL/L (ref 136–145)
T4 FREE SERPL-MCNC: 1.14 NG/DL (ref 0.93–1.7)
TSH SERPL DL<=0.05 MIU/L-ACNC: 6.21 UIU/ML (ref 0.27–4.2)
URATE SERPL-MCNC: 6.3 MG/DL (ref 2.4–5.7)
VIT B12 BLD-MCNC: 442 PG/ML (ref 211–946)
WBC NRBC COR # BLD AUTO: 8.16 10*3/MM3 (ref 3.4–10.8)

## 2024-01-18 PROCEDURE — 36415 COLL VENOUS BLD VENIPUNCTURE: CPT

## 2024-01-18 PROCEDURE — 86140 C-REACTIVE PROTEIN: CPT

## 2024-01-18 PROCEDURE — 82550 ASSAY OF CK (CPK): CPT

## 2024-01-18 PROCEDURE — 86038 ANTINUCLEAR ANTIBODIES: CPT

## 2024-01-18 PROCEDURE — 86431 RHEUMATOID FACTOR QUANT: CPT

## 2024-01-18 PROCEDURE — 86063 ANTISTREPTOLYSIN O SCREEN: CPT

## 2024-01-18 PROCEDURE — 84443 ASSAY THYROID STIM HORMONE: CPT

## 2024-01-18 PROCEDURE — 85025 COMPLETE CBC W/AUTO DIFF WBC: CPT

## 2024-01-18 PROCEDURE — 86060 ANTISTREPTOLYSIN O TITER: CPT

## 2024-01-18 PROCEDURE — 84550 ASSAY OF BLOOD/URIC ACID: CPT

## 2024-01-18 PROCEDURE — 82607 VITAMIN B-12: CPT

## 2024-01-18 PROCEDURE — 86200 CCP ANTIBODY: CPT

## 2024-01-18 PROCEDURE — 82746 ASSAY OF FOLIC ACID SERUM: CPT

## 2024-01-18 PROCEDURE — 85652 RBC SED RATE AUTOMATED: CPT

## 2024-01-18 PROCEDURE — 84439 ASSAY OF FREE THYROXINE: CPT

## 2024-01-18 PROCEDURE — 80053 COMPREHEN METABOLIC PANEL: CPT

## 2024-01-18 RX ORDER — KETOROLAC TROMETHAMINE 30 MG/ML
60 INJECTION, SOLUTION INTRAMUSCULAR; INTRAVENOUS ONCE
Status: COMPLETED | OUTPATIENT
Start: 2024-01-18 | End: 2024-01-18

## 2024-01-18 RX ORDER — METHYLPREDNISOLONE ACETATE 40 MG/ML
80 INJECTION, SUSPENSION INTRA-ARTICULAR; INTRALESIONAL; INTRAMUSCULAR; SOFT TISSUE ONCE
Status: COMPLETED | OUTPATIENT
Start: 2024-01-18 | End: 2024-01-18

## 2024-01-18 RX ADMIN — KETOROLAC TROMETHAMINE 60 MG: 30 INJECTION, SOLUTION INTRAMUSCULAR; INTRAVENOUS at 15:41

## 2024-01-18 RX ADMIN — METHYLPREDNISOLONE ACETATE 80 MG: 40 INJECTION, SUSPENSION INTRA-ARTICULAR; INTRALESIONAL; INTRAMUSCULAR; SOFT TISSUE at 15:42

## 2024-01-18 NOTE — PROGRESS NOTES
Meghan Lui presents to [facility] who presents to the clinic for worsening migraines, weakness, dizziness, worsening neck pain, concern for underlying process.      History of Present Illness  This is a 33-year-old female who presents to the clinic with complaints of worsening migraines, weakness, dizziness, worsening neck pain, and concern for underlying process.    Patient states that she has been having some worsening migraines, worsening pain as well in her back that she does have chronic issues with.  Patient states the migraines have changed.  They were originally starting in the front of her face, now they are starting more in her neck and her back and then radiating around to the front.  States that when she gets a migraine at this point it is completely debilitating, she has to go and sit down she does have an aura with associated issues with hearing, vision changes, and just feels extremely poorly when these occur.  She states that she is correlated these migraines worsening with weakness.  Patient states that she thought that it was just because she was having a flareup of her chronic back issues, had been seen by her chiropractor who told her that he thinks that something more in depth as well.  Actually placed a referral to neurology to further assess.  Patient states it almost feels like the left half of her body does not respond to her brain.  Her brain tells her to do 1 thing, but her body does not respond in that fashion.  Has a lot of weakness, feels like her legs want to give out on her, and is really unsure what is going on.  Does remain seeing pain management, taking her Norco and gabapentin, and just states it is not helping a ton.  At this point does not know what else to do.    The following portions of the patient's history were personally reviewed and updated as appropriate: allergies, current medications, past medical history, past surgical history,  "past family history, and past social history.       Objective   Vital Signs:   /72 (BP Location: Left arm, Patient Position: Sitting, Cuff Size: Adult)   Pulse 77   Temp 99 °F (37.2 °C) (Temporal)   Ht 182 cm (71.65\")   Wt (!) 140 kg (308 lb 3.2 oz)   SpO2 98%   BMI 42.21 kg/m²     Body mass index is 42.21 kg/m².    All labs, imaging, test results, and specialty provider notes reviewed with patient.     Physical Exam  Vitals reviewed.   Constitutional:       Appearance: Normal appearance.   Cardiovascular:      Rate and Rhythm: Normal rate and regular rhythm.      Pulses: Normal pulses.      Heart sounds: Normal heart sounds.   Pulmonary:      Effort: Pulmonary effort is normal.      Breath sounds: Normal breath sounds.   Neurological:      General: No focal deficit present.      Mental Status: She is alert and oriented to person, place, and time.            Assessment and Plan:  Diagnoses and all orders for this visit:    1. Weakness (Primary)  -     MRI Brain With & Without Contrast; Future    2. Fatigue, unspecified type  -     Vitamin B12 & Folate; Future  -     MRI Brain With & Without Contrast; Future    3. Arthralgia, unspecified joint  -     LOIS Direct Reflex to 11 Biomarker; Future  -     Cyclic citrul peptide antibody, IgG/IgA; Future  -     CK; Future  -     C-reactive protein; Future  -     Sedimentation rate; Future  -     Uric acid; Future  -     Antistreptolysin O screen; Future  -     Rheumatoid Factor; Future    4. Chronic migraine with aura and with status migrainosus, not intractable  -     ketorolac (TORADOL) injection 60 mg  -     methylPREDNISolone acetate (DEPO-medrol) injection 80 mg  -     MRI Brain With & Without Contrast; Future    5. Routine lab draw  -     CBC Auto Differential; Future  -     Comprehensive Metabolic Panel; Future  -     TSH Rfx On Abnormal To Free T4; Future    6. Neck pain    7. Numbness and tingling      At this point, we will start with ordering an MRI of " the brain to further evaluate and see if any abnormal findings are there.  Will order also blood work to make sure no electrolyte abnormalities, vitamin deficiencies, and we will also rule out any autoimmune origin for patient's symptoms.  Patient already has referral to neurology, patient to keep that.  As far as the new migraine, will treat with acute Toradol and Depo-Medrol injection to help.  Discussed warning signs and when to seek emergency evaluation.    Follow Up:  No follow-ups on file.    Patient was given instructions and counseling regarding her condition or for health maintenance advice. Please see specific information pulled into the AVS if appropriate.

## 2024-01-20 ENCOUNTER — APPOINTMENT (OUTPATIENT)
Dept: GENERAL RADIOLOGY | Facility: HOSPITAL | Age: 34
End: 2024-01-20
Payer: COMMERCIAL

## 2024-01-20 ENCOUNTER — HOSPITAL ENCOUNTER (EMERGENCY)
Facility: HOSPITAL | Age: 34
Discharge: HOME | End: 2024-01-20
Attending: EMERGENCY MEDICINE
Payer: COMMERCIAL

## 2024-01-20 VITALS
DIASTOLIC BLOOD PRESSURE: 82 MMHG | OXYGEN SATURATION: 100 % | SYSTOLIC BLOOD PRESSURE: 116 MMHG | TEMPERATURE: 98.2 F | HEART RATE: 64 BPM | WEIGHT: 293 LBS | RESPIRATION RATE: 20 BRPM | BODY MASS INDEX: 39.68 KG/M2 | HEIGHT: 72 IN

## 2024-01-20 DIAGNOSIS — M25.551 RIGHT HIP PAIN: ICD-10-CM
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
## 2024-01-20 DIAGNOSIS — M54.50 LOW BACK PAIN, UNSPECIFIED BACK PAIN LATERALITY, UNSPECIFIED CHRONICITY, UNSPECIFIED WHETHER SCIATICA PRESENT: ICD-10-CM

## 2024-01-20 LAB
ASO AB SERPL-ACNC: 195.1 IU/ML (ref 0–200)
CCP IGA+IGG SERPL IA-ACNC: 6 UNITS (ref 0–19)

## 2024-01-20 PROCEDURE — 96372 THER/PROPH/DIAG INJ SC/IM: CPT

## 2024-01-20 PROCEDURE — 72100 X-RAY EXAM L-S SPINE 2/3 VWS: CPT

## 2024-01-20 PROCEDURE — 25010000002 KETOROLAC TROMETHAMINE PER 15 MG

## 2024-01-20 PROCEDURE — 71045 X-RAY EXAM CHEST 1 VIEW: CPT

## 2024-01-20 PROCEDURE — 73502 X-RAY EXAM HIP UNI 2-3 VIEWS: CPT

## 2024-01-20 PROCEDURE — 99283 EMERGENCY DEPT VISIT LOW MDM: CPT

## 2024-01-20 RX ORDER — KETOROLAC TROMETHAMINE 30 MG/ML
30 INJECTION, SOLUTION INTRAMUSCULAR; INTRAVENOUS ONCE
Status: COMPLETED | OUTPATIENT
Start: 2024-01-20 | End: 2024-01-20

## 2024-01-20 RX ORDER — KETOROLAC TROMETHAMINE 10 MG/1
10 TABLET, FILM COATED ORAL EVERY 6 HOURS PRN
Qty: 15 TABLET | Refills: 0 | Status: SHIPPED | OUTPATIENT
Start: 2024-01-20

## 2024-01-20 RX ADMIN — KETOROLAC TROMETHAMINE 30 MG: 30 INJECTION, SOLUTION INTRAMUSCULAR; INTRAVENOUS at 18:16
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
## 2024-01-22 DIAGNOSIS — R79.89 ELEVATED TSH: Primary | ICD-10-CM

## 2024-01-22 LAB — ANA SER QL: NEGATIVE

## 2024-01-22 RX ORDER — FOLIC ACID 1 MG/1
1 TABLET ORAL DAILY
Qty: 60 TABLET | Refills: 2 | Status: SHIPPED | OUTPATIENT
Start: 2024-01-22

## 2024-01-23 ENCOUNTER — TELEPHONE (OUTPATIENT)
Dept: FAMILY MEDICINE CLINIC | Facility: CLINIC | Age: 34
End: 2024-01-23
Payer: COMMERCIAL
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
## 2024-01-24 ENCOUNTER — HOSPITAL ENCOUNTER (EMERGENCY)
Facility: HOSPITAL | Age: 34
Discharge: HOME | End: 2024-01-24
Attending: EMERGENCY MEDICINE | Admitting: EMERGENCY MEDICINE
Payer: COMMERCIAL

## 2024-01-24 ENCOUNTER — APPOINTMENT (OUTPATIENT)
Dept: CT IMAGING | Facility: HOSPITAL | Age: 34
End: 2024-01-24
Payer: COMMERCIAL

## 2024-01-24 VITALS
TEMPERATURE: 97.7 F | BODY MASS INDEX: 39.68 KG/M2 | HEIGHT: 72 IN | HEART RATE: 55 BPM | WEIGHT: 293 LBS | RESPIRATION RATE: 20 BRPM | OXYGEN SATURATION: 96 % | DIASTOLIC BLOOD PRESSURE: 74 MMHG | SYSTOLIC BLOOD PRESSURE: 113 MMHG

## 2024-01-24 DIAGNOSIS — F07.81 POST CONCUSSION SYNDROME: Primary | ICD-10-CM

## 2024-01-24 PROCEDURE — 99284 EMERGENCY DEPT VISIT MOD MDM: CPT

## 2024-01-24 PROCEDURE — 63710000001 ONDANSETRON ODT 4 MG TABLET DISPERSIBLE: Performed by: EMERGENCY MEDICINE

## 2024-01-24 PROCEDURE — 70450 CT HEAD/BRAIN W/O DYE: CPT

## 2024-01-24 RX ORDER — IBUPROFEN 400 MG/1
800 TABLET ORAL ONCE
Status: COMPLETED | OUTPATIENT
Start: 2024-01-24 | End: 2024-01-24

## 2024-01-24 RX ORDER — ONDANSETRON 4 MG/1
4 TABLET, ORALLY DISINTEGRATING ORAL ONCE
Status: COMPLETED | OUTPATIENT
Start: 2024-01-24 | End: 2024-01-24

## 2024-01-24 RX ORDER — IBUPROFEN 800 MG/1
800 TABLET ORAL EVERY 8 HOURS PRN
Qty: 30 TABLET | Refills: 0 | Status: SHIPPED | OUTPATIENT
Start: 2024-01-24

## 2024-01-24 RX ADMIN — ONDANSETRON 4 MG: 4 TABLET, ORALLY DISINTEGRATING ORAL at 05:16

## 2024-01-24 RX ADMIN — IBUPROFEN 800 MG: 400 TABLET ORAL at 05:16

## 2024-01-24 NOTE — Clinical Note
[facility] EMERGENCY ROOM  [address]  [place] [address]  Phone: [phone]    Meghan Lui was seen and treated in our emergency department on 1/24/2024.  She may return to work on 01/26/2024.         Thank you for choosing [facility].    Kvng Gabriel MD
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
## 2024-01-30 DIAGNOSIS — E03.9 HYPOTHYROIDISM, UNSPECIFIED TYPE: ICD-10-CM

## 2024-01-30 RX ORDER — LEVOTHYROXINE SODIUM 137 MCG
137 TABLET ORAL DAILY
Qty: 90 TABLET | Refills: 1 | Status: SHIPPED | OUTPATIENT
Start: 2024-01-30

## 2024-02-15 ENCOUNTER — OFFICE VISIT (OUTPATIENT)
Dept: FAMILY MEDICINE CLINIC | Facility: CLINIC | Age: 34
End: 2024-02-15
Payer: COMMERCIAL

## 2024-02-15 VITALS
BODY MASS INDEX: 39.68 KG/M2 | HEART RATE: 64 BPM | HEIGHT: 72 IN | OXYGEN SATURATION: 98 % | WEIGHT: 293 LBS | TEMPERATURE: 97.6 F | DIASTOLIC BLOOD PRESSURE: 68 MMHG | SYSTOLIC BLOOD PRESSURE: 102 MMHG

## 2024-02-15 DIAGNOSIS — R26.9 GAIT DISTURBANCE: ICD-10-CM

## 2024-02-15 DIAGNOSIS — M79.2 NEUROPATHIC PAIN, LEG, RIGHT: ICD-10-CM
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
## 2024-02-15 DIAGNOSIS — M54.41 ACUTE BILATERAL LOW BACK PAIN WITH RIGHT-SIDED SCIATICA: ICD-10-CM

## 2024-02-15 DIAGNOSIS — M25.551 RIGHT HIP PAIN: ICD-10-CM

## 2024-02-15 DIAGNOSIS — S06.0X0A CONCUSSION WITHOUT LOSS OF CONSCIOUSNESS, INITIAL ENCOUNTER: ICD-10-CM

## 2024-02-15 DIAGNOSIS — M25.561 ACUTE PAIN OF RIGHT KNEE: ICD-10-CM

## 2024-02-16 ENCOUNTER — PROCEDURE VISIT (OUTPATIENT)
Dept: OTOLARYNGOLOGY | Facility: CLINIC | Age: 34
End: 2024-02-16
Payer: COMMERCIAL

## 2024-02-16 DIAGNOSIS — H93.8X3 EAR PRESSURE, BILATERAL: ICD-10-CM

## 2024-02-16 DIAGNOSIS — H90.A22 SENSORINEURAL HEARING LOSS (SNHL) OF LEFT EAR WITH RESTRICTED HEARING OF RIGHT EAR: ICD-10-CM

## 2024-02-16 DIAGNOSIS — H90.A21 SENSORINEURAL HEARING LOSS (SNHL) OF RIGHT EAR WITH RESTRICTED HEARING OF LEFT EAR: Primary | ICD-10-CM

## 2024-02-16 DIAGNOSIS — H93.13 TINNITUS, BILATERAL: ICD-10-CM

## 2024-02-22 ENCOUNTER — HOSPITAL ENCOUNTER (OUTPATIENT)
Dept: MRI IMAGING | Facility: HOSPITAL | Age: 34
Discharge: HOME | End: 2024-02-22
Payer: COMMERCIAL

## 2024-02-22 DIAGNOSIS — G43.E01 CHRONIC MIGRAINE WITH AURA AND WITH STATUS MIGRAINOSUS, NOT INTRACTABLE: ICD-10-CM

## 2024-02-22 DIAGNOSIS — R53.83 FATIGUE, UNSPECIFIED TYPE: ICD-10-CM

## 2024-02-22 DIAGNOSIS — R53.1 WEAKNESS: ICD-10-CM

## 2024-02-22 PROCEDURE — 0 GADOBENATE DIMEGLUMINE 529 MG/ML SOLUTION

## 2024-02-22 PROCEDURE — A9577 INJ MULTIHANCE: HCPCS

## 2024-02-22 PROCEDURE — 70553 MRI BRAIN STEM W/O & W/DYE: CPT

## 2024-02-22 RX ADMIN — GADOBENATE DIMEGLUMINE 20 ML: 529 INJECTION, SOLUTION INTRAVENOUS at 17:02

## 2024-02-27 DIAGNOSIS — E28.2 PCOS (POLYCYSTIC OVARIAN SYNDROME): ICD-10-CM

## 2024-02-27 DIAGNOSIS — L68.9 EXCESS BODY AND FACIAL HAIR: ICD-10-CM

## 2024-02-27 DIAGNOSIS — R11.2 NAUSEA AND VOMITING, UNSPECIFIED VOMITING TYPE: ICD-10-CM

## 2024-02-27 DIAGNOSIS — I10 PRIMARY HYPERTENSION: ICD-10-CM

## 2024-02-27 DIAGNOSIS — L70.0 ACNE VULGARIS: ICD-10-CM

## 2024-02-27 RX ORDER — LISINOPRIL 5 MG/1
5 TABLET ORAL DAILY
Qty: 90 TABLET | Refills: 1 | Status: SHIPPED | OUTPATIENT
Start: 2024-02-27

## 2024-02-27 RX ORDER — ONDANSETRON 4 MG/1
TABLET, ORALLY DISINTEGRATING ORAL
Qty: 30 TABLET | Refills: 0 | Status: SHIPPED | OUTPATIENT
Start: 2024-02-27

## 2024-02-27 RX ORDER — SPIRONOLACTONE 25 MG/1
25 TABLET ORAL DAILY
Qty: 90 TABLET | Refills: 1 | Status: SHIPPED | OUTPATIENT
Start: 2024-02-27

## 2024-02-27 RX ORDER — METFORMIN HYDROCHLORIDE 500 MG/1
500 TABLET, EXTENDED RELEASE ORAL
Qty: 90 TABLET | Refills: 1 | Status: SHIPPED | OUTPATIENT
Start: 2024-02-27

## 2024-03-13 ENCOUNTER — OFFICE VISIT (OUTPATIENT)
Dept: FAMILY MEDICINE CLINIC | Facility: CLINIC | Age: 34
End: 2024-03-13
Payer: COMMERCIAL

## 2024-03-13 VITALS
WEIGHT: 293 LBS | SYSTOLIC BLOOD PRESSURE: 116 MMHG | HEART RATE: 67 BPM | TEMPERATURE: 98.2 F | OXYGEN SATURATION: 97 % | HEIGHT: 72 IN | DIASTOLIC BLOOD PRESSURE: 68 MMHG | BODY MASS INDEX: 39.68 KG/M2

## 2024-03-13 DIAGNOSIS — L02.32 BOIL OF BUTTOCK: ICD-10-CM

## 2024-03-13 DIAGNOSIS — L73.2 HIDRADENITIS: Primary | ICD-10-CM

## 2024-03-13 PROCEDURE — 3074F SYST BP LT 130 MM HG: CPT

## 2024-03-13 PROCEDURE — 3078F DIAST BP <80 MM HG: CPT

## 2024-03-13 PROCEDURE — 99213 OFFICE O/P EST LOW 20 MIN: CPT

## 2024-03-13 RX ORDER — MUPIROCIN CALCIUM 20 MG/G
1 CREAM TOPICAL 3 TIMES DAILY
Qty: 30 G | Refills: 0 | Status: SHIPPED | OUTPATIENT
Start: 2024-03-13

## 2024-03-13 NOTE — PROGRESS NOTES
"Meghan Lui presents to [facility] with complaints of at HS flare of the buttock.      History of Present Illness  This is a 33-year-old female who presents to the clinic with complaints of HS flare buttock.    States that she gets these periodically, usually she can get a hold of them before they become severe, but states that she has tried her normal regiment over-the-counter and nothing is giving her any relief and she is really worried that this 1 is going to end up having to be surgically removed like she has had done in the past.  Patient states it is very painful, she knows that is what it is, and is here to see what needs to be done to get it to go away.  Denies any fever/chill/bodyaches, denies any drainage, no other symptoms.    The following portions of the patient's history were personally reviewed and updated as appropriate: allergies, current medications, past medical history, past surgical history, past family history, and past social history.       Objective   Vital Signs:   /68 (BP Location: Left arm, Patient Position: Sitting, Cuff Size: Adult)   Pulse 67   Temp 98.2 °F (36.8 °C)   Ht 185.4 cm (73\")   Wt 135 kg (297 lb 4.8 oz)   SpO2 97%   BMI 39.22 kg/m²     Body mass index is 39.22 kg/m².    All labs, imaging, test results, and specialty provider notes reviewed with patient.     Physical Exam  Vitals reviewed.   Constitutional:       Appearance: Normal appearance.   Pulmonary:      Effort: Pulmonary effort is normal.   Neurological:      General: No focal deficit present.      Mental Status: She is alert and oriented to person, place, and time.            Incision & Drainage    Date/Time: 3/13/2024 2:25 PM    Performed by: Dionna Valenzuela APRN  Authorized by: Dionna Valenzuela APRN  Type: cyst  Body area: anogenital  Location details: gluteal cleft  Anesthesia: local infiltration    Anesthesia:  Local Anesthetic: lidocaine 2% without " epinephrine    Sedation:  Patient sedated: no    Scalpel size: 10  Drainage: purulent  Drainage amount: scant  Wound treatment: wound left open  Patient tolerance: patient tolerated the procedure well with no immediate complications                        Assessment and Plan:  Diagnoses and all orders for this visit:    1. Hidradenitis (Primary)  -     mupirocin (BACTROBAN) 2 % cream; Apply 1 Application topically to the appropriate area as directed 3 (Three) Times a Day.  Dispense: 30 g; Refill: 0    2. Boil of buttock  -     mupirocin (BACTROBAN) 2 % cream; Apply 1 Application topically to the appropriate area as directed 3 (Three) Times a Day.  Dispense: 30 g; Refill: 0    Other orders  -     Incision & Drainage    Drained area, discussed with patient to start putting the mupirocin cream on twice a day, discussed warning signs and when to seek further evaluation.      Follow Up:  No follow-ups on file.    Patient was given instructions and counseling regarding her condition or for health maintenance advice. Please see specific information pulled into the AVS if appropriate.

## 2024-03-20 ENCOUNTER — HOSPITAL ENCOUNTER (OUTPATIENT)
Dept: CT IMAGING | Facility: HOSPITAL | Age: 34
Discharge: HOME | End: 2024-03-20
Payer: COMMERCIAL

## 2024-03-20 DIAGNOSIS — R06.02 SHORTNESS OF BREATH: ICD-10-CM

## 2024-03-20 DIAGNOSIS — J90 PLEURAL EFFUSION: ICD-10-CM

## 2024-03-20 PROCEDURE — 71250 CT THORAX DX C-: CPT

## 2024-03-26 ENCOUNTER — OFFICE VISIT (OUTPATIENT)
Dept: FAMILY MEDICINE CLINIC | Facility: CLINIC | Age: 34
End: 2024-03-26
Payer: COMMERCIAL

## 2024-03-26 VITALS
SYSTOLIC BLOOD PRESSURE: 128 MMHG | HEIGHT: 72 IN | DIASTOLIC BLOOD PRESSURE: 82 MMHG | OXYGEN SATURATION: 97 % | WEIGHT: 293 LBS | HEART RATE: 78 BPM | BODY MASS INDEX: 39.68 KG/M2 | TEMPERATURE: 97.6 F

## 2024-03-26 DIAGNOSIS — L73.2 HIDRADENITIS: ICD-10-CM

## 2024-03-26 DIAGNOSIS — M54.41 ACUTE BILATERAL LOW BACK PAIN WITH RIGHT-SIDED SCIATICA: ICD-10-CM

## 2024-03-26 DIAGNOSIS — L02.32 BOIL OF BUTTOCK: Primary | ICD-10-CM

## 2024-03-26 DIAGNOSIS — M25.551 RIGHT HIP PAIN: ICD-10-CM

## 2024-03-26 PROCEDURE — 1159F MED LIST DOCD IN RCRD: CPT

## 2024-03-26 PROCEDURE — 99214 OFFICE O/P EST MOD 30 MIN: CPT

## 2024-03-26 PROCEDURE — 1160F RVW MEDS BY RX/DR IN RCRD: CPT

## 2024-03-26 PROCEDURE — 3079F DIAST BP 80-89 MM HG: CPT

## 2024-03-26 PROCEDURE — 3074F SYST BP LT 130 MM HG: CPT

## 2024-03-26 RX ORDER — CLONIDINE HYDROCHLORIDE 0.1 MG/1
TABLET ORAL
COMMUNITY
Start: 2024-03-22

## 2024-03-26 NOTE — PROGRESS NOTES
"Meghan Lui presents to [facility] who presents to the clinic for follow-up boil.      History of Present Illness  This is a 33-year-old female who presents to the clinic for follow-up boil.    Patient was last seen by me about 2 weeks ago, had this lesion/boil on her right buttock/chronic area.  We did drain this partially, and provided medication for her to use at home in the meantime.  Patient states that she is coming back today because it has gotten worse, states that it seems almost like it is getting bigger, and that it is not ever successfully drained completely.  She has had a lot of these lesions/boils in the past, has even had to have them surgically removed in the past.  States at this point, it is very tender for her to sit in that location, is in a very bad spot as far as when she has GI issues, and states that she just needs it taken care of because it is causing her misery.  Denies any fever/chills/body aches.  Denies any other symptoms.    Patient also states that she is still having some pretty severe pain in her lower back and her right hip.  She did have MRIs ordered, they were denied by insurance for physical therapy, and she would like to start on that so that way we can see if things are improving with physical therapy or if we need to repeat imaging at that time.  No findings were found via x-rays.    The following portions of the patient's history were personally reviewed and updated as appropriate: allergies, current medications, past medical history, past surgical history, past family history, and past social history.       Objective   Vital Signs:   /82 (BP Location: Left arm, Patient Position: Sitting, Cuff Size: Adult)   Pulse 78   Temp 97.6 °F (36.4 °C)   Ht 185.4 cm (73\")   Wt (!) 138 kg (303 lb 14.4 oz)   SpO2 97%   BMI 40.09 kg/m²     Body mass index is 40.09 kg/m².    All labs, imaging, test results, and specialty provider notes " reviewed with patient.     Physical Exam  Vitals reviewed.   Constitutional:       Appearance: Normal appearance.   Cardiovascular:      Rate and Rhythm: Normal rate and regular rhythm.      Pulses: Normal pulses.      Heart sounds: Normal heart sounds.   Pulmonary:      Effort: Pulmonary effort is normal.      Breath sounds: Normal breath sounds.   Neurological:      General: No focal deficit present.      Mental Status: She is alert and oriented to person, place, and time.              Assessment and Plan:  Diagnoses and all orders for this visit:    1. Boil of buttock (Primary)  Comments:  Refer to general surgery for removal, much deeper upon exam this time.  May need surgical intervention and removal.  Orders:  -     Ambulatory Referral to General Surgery    2. Hidradenitis  -     Ambulatory Referral to General Surgery    3. Acute bilateral low back pain with right-sided sciatica  Comments:  Will order physical therapy, if still improving, we will then try for MRI at that time.  Orders:  -     Ambulatory Referral to Physical Therapy Evaluate and treat    4. Right hip pain  -     Ambulatory Referral to Physical Therapy Evaluate and treat          Follow Up:  No follow-ups on file.    Patient was given instructions and counseling regarding her condition or for health maintenance advice. Please see specific information pulled into the AVS if appropriate.

## 2024-03-27 ENCOUNTER — LAB (OUTPATIENT)
Dept: LAB | Facility: HOSPITAL | Age: 34
End: 2024-03-27
Payer: COMMERCIAL

## 2024-03-27 ENCOUNTER — APPOINTMENT (OUTPATIENT)
Dept: MRI IMAGING | Facility: HOSPITAL | Age: 34
End: 2024-03-27
Payer: COMMERCIAL

## 2024-03-27 ENCOUNTER — HOSPITAL ENCOUNTER (OUTPATIENT)
Dept: MRI IMAGING | Facility: HOSPITAL | Age: 34
Discharge: HOME | End: 2024-03-27
Payer: COMMERCIAL

## 2024-03-27 DIAGNOSIS — M25.561 ACUTE PAIN OF RIGHT KNEE: ICD-10-CM

## 2024-03-27 DIAGNOSIS — R79.89 ELEVATED TSH: ICD-10-CM

## 2024-03-27 PROCEDURE — 73721 MRI JNT OF LWR EXTRE W/O DYE: CPT

## 2024-04-09 ENCOUNTER — PREP FOR SURGERY (OUTPATIENT)
Dept: OTHER | Facility: HOSPITAL | Age: 34
End: 2024-04-09
Payer: COMMERCIAL

## 2024-04-09 DIAGNOSIS — E66.01 OBESITY, CLASS III, BMI 40-49.9 (MORBID OBESITY): Primary | ICD-10-CM

## 2024-04-09 RX ORDER — METOCLOPRAMIDE HYDROCHLORIDE 5 MG/ML
10 INJECTION INTRAMUSCULAR; INTRAVENOUS ONCE
OUTPATIENT
Start: 2024-04-09 | End: 2024-04-09

## 2024-04-09 RX ORDER — CHLORHEXIDINE GLUCONATE ORAL RINSE 1.2 MG/ML
15 SOLUTION DENTAL SEE ADMIN INSTRUCTIONS
OUTPATIENT
Start: 2024-04-09

## 2024-04-09 RX ORDER — PROMETHAZINE HYDROCHLORIDE 12.5 MG/1
12.5 TABLET ORAL ONCE AS NEEDED
OUTPATIENT
Start: 2024-04-09

## 2024-04-09 RX ORDER — GABAPENTIN 300 MG/1
300 CAPSULE ORAL ONCE
OUTPATIENT
Start: 2024-04-09 | End: 2024-04-09

## 2024-04-09 RX ORDER — SODIUM CHLORIDE 0.9 % (FLUSH) 0.9 %
3 SYRINGE (ML) INJECTION EVERY 12 HOURS SCHEDULED
OUTPATIENT
Start: 2024-04-09

## 2024-04-09 RX ORDER — SODIUM CHLORIDE, SODIUM LACTATE, POTASSIUM CHLORIDE, CALCIUM CHLORIDE 600; 310; 30; 20 MG/100ML; MG/100ML; MG/100ML; MG/100ML
100 INJECTION, SOLUTION INTRAVENOUS CONTINUOUS
OUTPATIENT
Start: 2024-04-09

## 2024-04-09 RX ORDER — PROMETHAZINE HYDROCHLORIDE 25 MG/1
25 SUPPOSITORY RECTAL ONCE AS NEEDED
OUTPATIENT
Start: 2024-04-09

## 2024-04-09 RX ORDER — SCOLOPAMINE TRANSDERMAL SYSTEM 1 MG/1
1 PATCH, EXTENDED RELEASE TRANSDERMAL CONTINUOUS
OUTPATIENT
Start: 2024-04-09 | End: 2024-04-12

## 2024-04-09 RX ORDER — SODIUM CHLORIDE 9 MG/ML
40 INJECTION, SOLUTION INTRAVENOUS AS NEEDED
OUTPATIENT
Start: 2024-04-09

## 2024-04-09 RX ORDER — SODIUM CHLORIDE 0.9 % (FLUSH) 0.9 %
3-10 SYRINGE (ML) INJECTION AS NEEDED
OUTPATIENT
Start: 2024-04-09

## 2024-04-09 RX ORDER — PANTOPRAZOLE SODIUM 40 MG/10ML
40 INJECTION, POWDER, LYOPHILIZED, FOR SOLUTION INTRAVENOUS ONCE
OUTPATIENT
Start: 2024-04-09 | End: 2024-04-09

## 2024-04-16 ENCOUNTER — OFFICE VISIT (OUTPATIENT)
Dept: SURGERY | Facility: CLINIC | Age: 34
End: 2024-04-16
Payer: COMMERCIAL

## 2024-04-16 VITALS — WEIGHT: 208 LBS | RESPIRATION RATE: 18 BRPM | BODY MASS INDEX: 28.17 KG/M2 | HEIGHT: 72 IN

## 2024-04-16 DIAGNOSIS — L73.2 HIDRADENITIS SUPPURATIVA OF ANUS: Primary | ICD-10-CM

## 2024-04-16 PROCEDURE — 1160F RVW MEDS BY RX/DR IN RCRD: CPT | Performed by: SURGERY

## 2024-04-16 PROCEDURE — 99204 OFFICE O/P NEW MOD 45 MIN: CPT | Performed by: SURGERY

## 2024-04-16 PROCEDURE — 1159F MED LIST DOCD IN RCRD: CPT | Performed by: SURGERY

## 2024-04-16 NOTE — PROGRESS NOTES
Inpatient History and Physical Surgical Orders    Preadmission Location:   Preadmission Time:  Facility:  Surgery Date:  Surgery Time:  Preadmission Test date:     Chief Complaint  Outpatient History and Physical / Surgical Orders    Primary Care Provider: Dionna Valenzuela APRN    Referring Provider: Dionna Valenzuela APRN    Subjective      Patient Name: Meghan Lui : 1990    HPI  The patient is a 33-year-old female who has had longstanding trouble with hidradenitis.  She has had surgery before on her left leg for this problem.  She has developed another area of hidradenitis adjacent to her anus.  She had that drained by her primary care provider and is doing better.    Past History:  Medical History: has a past medical history of Anemia (2023), Anxiety, Arthritis, Asthma, Bladder problem, Cholelithiasis, Condition not found, Cyst of skin, Depression, Foot pain, left (2019), Heel pain, HL (hearing loss), blood diseases, degenerative disc disease, Hyperlipidemia, Hypertension (), Hypothyroidism, Ingrown toenail, Irritable bowel syndrome, Leg pain, Leg swelling, Lumbago, Migraines, Mood disorder, Muscle cramps, Neuromuscular disorder (), Numbness in feet, Obesity, Thyroid disease, and Visual impairment.   Surgical History: has a past surgical history that includes  section; Cholecystectomy (); Dilation and curettage, diagnostic / therapeutic (); and [place] tooth extraction ().   Family History: family history includes Arthritis in her paternal grandmother and sister; COPD in her paternal grandfather; Cancer in her father, paternal grandfather, and paternal grandmother; Diabetes in her mother; Early death in her father; Hypertension in her mother; Obesity in her maternal aunt, maternal uncle, and mother; Thyroid disease in her father and mother; Vision loss in her mother.   Social History: reports that she quit smoking about 2 years ago. Her smoking use  included cigarettes. She started smoking about 18 years ago. She has a 8 pack-year smoking history. She has never used smokeless tobacco. She reports that she does not currently use alcohol. She reports that she does not use drugs.  Allergies: Remeron [mirtazapine], Mushroom, and Buspirone       Current Outpatient Medications:     baclofen (LIORESAL) 10 MG tablet, Take 1 tablet 3 times a day by oral route as needed for 30 days., Disp: , Rfl:     Chlorhexidine Gluconate (Latoya-Hex 4) 4 % solution, Apply 1 application  topically to the appropriate area as directed Daily., Disp: 473 mL, Rfl: 12    cloNIDine (CATAPRES) 0.1 MG tablet, , Disp: , Rfl:     diclofenac (VOLTAREN) 50 MG EC tablet, TAKE ONE TABLET BY MOUTH TWICE A DAY, Disp: 60 tablet, Rfl: 2    dicyclomine (BENTYL) 10 MG capsule, TAKE ONE CAPSULE BY MOUTH FOUR TIMES A DAY BEFORE MEALS AND AT BEDTIME, Disp: 120 capsule, Rfl: 2    Doxepin HCl 3 MG tablet, , Disp: , Rfl:     ferrous sulfate 325 (65 FE) MG EC tablet, TAKE ONE TABLET BY MOUTH ONCE DAILY WITH BREAKFAST, Disp: 90 tablet, Rfl: 1    folic acid (FOLVITE) 1 MG tablet, Take 1 tablet by mouth Daily., Disp: 60 tablet, Rfl: 2    gabapentin (NEURONTIN) 800 MG tablet, 1 tablet 2 (Two) Times a Day., Disp: , Rfl:     hydroCHLOROthiazide (HYDRODIURIL) 12.5 MG tablet, TAKE ONE TABLET BY MOUTH ONCE DAILY, Disp: 90 tablet, Rfl: 3    HYDROcodone-acetaminophen (NORCO) 7.5-325 MG per tablet, Take 2 tablets by mouth Every 4 (Four) Hours As Needed., Disp: , Rfl:     hydrOXYzine pamoate (VISTARIL) 50 MG capsule, Take 1 capsule by mouth 3 (Three) Times a Day As Needed., Disp: , Rfl:     lamoTRIgine (LaMICtal) 200 MG tablet, Take 1 tablet by mouth 2 (Two) Times a Day., Disp: 90 tablet, Rfl: 1    lidocaine (XYLOCAINE) 2 % jelly, Apply  topically to the appropriate area as directed As Needed for Mild Pain., Disp: 28 g, Rfl: 0    lisinopril (PRINIVIL,ZESTRIL) 5 MG tablet, TAKE ONE TABLET BY MOUTH ONCE DAILY, Disp: 90 tablet,  "Rfl: 1    metFORMIN ER (GLUCOPHAGE-XR) 500 MG 24 hr tablet, TAKE ONE TABLET BY MOUTH ONCE DAILY WITH BREAKFAST, Disp: 90 tablet, Rfl: 1    mupirocin (BACTROBAN) 2 % cream, Apply 1 Application topically to the appropriate area as directed 3 (Three) Times a Day., Disp: 30 g, Rfl: 0    spironolactone (ALDACTONE) 25 MG tablet, TAKE ONE TABLET BY MOUTH ONCE DAILY, Disp: 90 tablet, Rfl: 1    Synthroid 137 MCG tablet, TAKE ONE TABLET BY MOUTH EVERY DAY, Disp: 90 tablet, Rfl: 1    clindamycin (CLEOCIN T) 1 % external solution, clindamycin phosphate 1 % topical solution, Disp: , Rfl:        Objective   Vital Signs:   Resp 18   Ht 185.4 cm (72.99\")   Wt 94.3 kg (208 lb)   BMI 27.45 kg/m²       Physical Exam  Vitals and nursing note reviewed.   Constitutional:       Appearance: Normal appearance. The patient is well-developed.   Cardiovascular:      Rate and Rhythm: Normal rate and regular rhythm.   Pulmonary:      Effort: Pulmonary effort is normal.      Breath sounds: Normal air entry.   Abdominal:      General: Bowel sounds are normal.      Palpations: Abdomen is soft.      Skin:     General: Skin is warm and dry.   Neurological:      Mental Status: The patient is alert and oriented to person, place, and time.      Motor: Motor function is intact.   Psychiatric:         Mood and Affect: Mood normal.   Rectum: Perianal hidradenitis noted on the medial right buttock    Result Review :               Assessment and Plan   Diagnoses and all orders for this visit:    1. Hidradenitis suppurativa of anus (Primary)    I have told Meghan that at some point she should probably consider having this area excised because I think she will continue to have recurrent infections otherwise.  I explained to her that it would have to be an excision with a wound that was left open and allowed to heal by secondary intention given its proximity to the rectum.  She indicated that she is getting ready to have bariatric surgery later this month " and I told her that we would probably want her to get through that process before addressing this area next to her rectum.  She indicated that she would call me once she is done with bariatric surgery and recovering well and we would consider scheduling that electively.    I  Quoc Chaudhry MD  04/16/2024

## 2024-04-18 ENCOUNTER — PRE-ADMISSION TESTING (OUTPATIENT)
Dept: PREADMISSION TESTING | Facility: HOSPITAL | Age: 34
End: 2024-04-18
Payer: COMMERCIAL

## 2024-04-18 ENCOUNTER — CONSULT (OUTPATIENT)
Dept: BARIATRICS/WEIGHT MGMT | Facility: CLINIC | Age: 34
End: 2024-04-18
Payer: COMMERCIAL

## 2024-04-18 VITALS
HEIGHT: 72 IN | HEART RATE: 61 BPM | DIASTOLIC BLOOD PRESSURE: 74 MMHG | BODY MASS INDEX: 39.68 KG/M2 | WEIGHT: 293 LBS | TEMPERATURE: 97.8 F | SYSTOLIC BLOOD PRESSURE: 136 MMHG

## 2024-04-18 VITALS
TEMPERATURE: 97.9 F | OXYGEN SATURATION: 99 % | HEART RATE: 72 BPM | HEIGHT: 72 IN | DIASTOLIC BLOOD PRESSURE: 66 MMHG | RESPIRATION RATE: 20 BRPM | SYSTOLIC BLOOD PRESSURE: 106 MMHG | BODY MASS INDEX: 40.77 KG/M2

## 2024-04-18 DIAGNOSIS — I10 PRIMARY HYPERTENSION: ICD-10-CM

## 2024-04-18 DIAGNOSIS — M25.50 MULTIPLE JOINT PAIN: ICD-10-CM

## 2024-04-18 DIAGNOSIS — K58.2 IRRITABLE BOWEL SYNDROME WITH BOTH CONSTIPATION AND DIARRHEA: ICD-10-CM

## 2024-04-18 DIAGNOSIS — E66.01 OBESITY, CLASS III, BMI 40-49.9 (MORBID OBESITY): ICD-10-CM

## 2024-04-18 DIAGNOSIS — E03.8 OTHER SPECIFIED HYPOTHYROIDISM: ICD-10-CM

## 2024-04-18 DIAGNOSIS — E28.2 PCOS (POLYCYSTIC OVARIAN SYNDROME): ICD-10-CM

## 2024-04-18 DIAGNOSIS — E66.01 OBESITY, CLASS III, BMI 40-49.9 (MORBID OBESITY): Primary | ICD-10-CM

## 2024-04-18 DIAGNOSIS — F41.8 DEPRESSION WITH ANXIETY: ICD-10-CM

## 2024-04-18 LAB
ALBUMIN SERPL-MCNC: 4.1 G/DL (ref 3.5–5.2)
ALBUMIN/GLOB SERPL: 1.7 G/DL
ALP SERPL-CCNC: 63 U/L (ref 39–117)
ALT SERPL W P-5'-P-CCNC: 18 U/L (ref 1–33)
ANION GAP SERPL CALCULATED.3IONS-SCNC: 11 MMOL/L (ref 5–15)
AST SERPL-CCNC: 12 U/L (ref 1–32)
BILIRUB SERPL-MCNC: 0.3 MG/DL (ref 0–1.2)
BUN SERPL-MCNC: 12 MG/DL (ref 6–20)
BUN/CREAT SERPL: 11 (ref 7–25)
CALCIUM SPEC-SCNC: 9 MG/DL (ref 8.6–10.5)
CHLORIDE SERPL-SCNC: 102 MMOL/L (ref 98–107)
CO2 SERPL-SCNC: 26 MMOL/L (ref 22–29)
CREAT SERPL-MCNC: 1.09 MG/DL (ref 0.57–1)
DEPRECATED RDW RBC AUTO: 43.6 FL (ref 37–54)
EGFRCR SERPLBLD CKD-EPI 2021: 68.9 ML/MIN/1.73
ERYTHROCYTE [DISTWIDTH] IN BLOOD BY AUTOMATED COUNT: 12.8 % (ref 12.3–15.4)
GLOBULIN UR ELPH-MCNC: 2.4 GM/DL
GLUCOSE SERPL-MCNC: 85 MG/DL (ref 65–99)
HCG SERPL QL: NEGATIVE
HCT VFR BLD AUTO: 39.6 % (ref 34–46.6)
HGB BLD-MCNC: 13.3 G/DL (ref 12–15.9)
MCH RBC QN AUTO: 31.4 PG (ref 26.6–33)
MCHC RBC AUTO-ENTMCNC: 33.6 G/DL (ref 31.5–35.7)
MCV RBC AUTO: 93.4 FL (ref 79–97)
PLATELET # BLD AUTO: 270 10*3/MM3 (ref 140–450)
PMV BLD AUTO: 9.6 FL (ref 6–12)
POTASSIUM SERPL-SCNC: 3.5 MMOL/L (ref 3.5–5.2)
PROT SERPL-MCNC: 6.5 G/DL (ref 6–8.5)
QT INTERVAL: 434 MS
QTC INTERVAL: 434 MS
RBC # BLD AUTO: 4.24 10*6/MM3 (ref 3.77–5.28)
SODIUM SERPL-SCNC: 139 MMOL/L (ref 136–145)
WBC NRBC COR # BLD AUTO: 9.24 10*3/MM3 (ref 3.4–10.8)

## 2024-04-18 PROCEDURE — 80053 COMPREHEN METABOLIC PANEL: CPT

## 2024-04-18 PROCEDURE — 36415 COLL VENOUS BLD VENIPUNCTURE: CPT

## 2024-04-18 PROCEDURE — 93005 ELECTROCARDIOGRAM TRACING: CPT

## 2024-04-18 PROCEDURE — 93010 ELECTROCARDIOGRAM REPORT: CPT | Performed by: INTERNAL MEDICINE

## 2024-04-18 PROCEDURE — 85027 COMPLETE CBC AUTOMATED: CPT

## 2024-04-18 PROCEDURE — 84703 CHORIONIC GONADOTROPIN ASSAY: CPT

## 2024-04-18 RX ORDER — ENOXAPARIN SODIUM 100 MG/ML
40 INJECTION SUBCUTANEOUS EVERY 12 HOURS SCHEDULED
Qty: 11.2 ML | Refills: 0 | Status: SHIPPED | OUTPATIENT
Start: 2024-04-18 | End: 2024-05-02

## 2024-04-18 NOTE — H&P
Bariatric Consult:  Referred by Dionna Valenzuela APRN    Meghan Lui is here today for consult on Consult (Sleeve pre-op consult )      History of Present Illness:     Meghan Lui is a 33 y.o. female with morbid obesity with co-morbidities including hypertension, back pain, and knee pain who presents for surgical consultation for the above procedure. Meghan has completed the initial intake visit and has been examined by our nurse practitioner, dietician, psychologist and underwent the extensive educational teaching process under the guidance of our bariatric coordinator and myself. Meghan also has seen or if has not yet will see the educational video VISH on the surgical procedure if available. Meghan attended today more educational teaching from our bariatric coordinator and myself. Meghan has had an extensive medical workup including a visit with their primary care physician, EKG, chest radiograph, blood work, EGD or UGI and possibly further testing. These have been reviewed by me and discussed with the patient. Meghan is now ready to proceed with surgery. Meghan presently denies nausea, vomiting, fever, chills, chest pain, shortness of air, melena, hematochezia, hemetemesis, dysuria, frequency, hematuria.     Past Medical History:   Diagnosis Date    Anemia april 2023    Anxiety     Arthritis     Asthma     Bladder problem     Cholelithiasis     Removed October 2011    Condition not found     HERNIATED DISC (UNSPECIFIED)     Cyst of skin     Depression     Foot pain, left 12/04/2019    Heel pain     HL (hearing loss)     Hx of blood diseases     Hx of degenerative disc disease     Hyperlipidemia     Hypertension 2015    Hypothyroidism     Ingrown toenail     Irritable bowel syndrome     Leg pain     Leg swelling     Lumbago     Migraines     Mood disorder     Muscle cramps     Neuromuscular disorder 2018    Numbness in feet     Obesity     Thyroid disease     Visual impairment         Encounter Diagnoses   Name Primary?    Obesity, Class III, BMI 40-49.9 (morbid obesity) Yes    Primary hypertension     Multiple joint pain     Depression with anxiety     Irritable bowel syndrome with both constipation and diarrhea     PCOS (polycystic ovarian syndrome)     Other specified hypothyroidism        Past Surgical History:   Procedure Laterality Date     SECTION      , , 2017    CHOLECYSTECTOMY      DILATION AND CURETTAGE, DIAGNOSTIC / THERAPEUTIC      WISDOM TOOTH EXTRACTION           * No active hospital problems. *      Allergies   Allergen Reactions    Remeron [Mirtazapine] Other (See Comments)     Suicidal ideation    Mushroom GI Intolerance    Buspirone Mental Status Change     Suicidal ideation         Current Outpatient Medications:     baclofen (LIORESAL) 10 MG tablet, Take 1 tablet 3 times a day by oral route as needed for 30 days., Disp: , Rfl:     Chlorhexidine Gluconate (Latoya-Hex 4) 4 % solution, Apply 1 application  topically to the appropriate area as directed Daily., Disp: 473 mL, Rfl: 12    cloNIDine (CATAPRES) 0.1 MG tablet, , Disp: , Rfl:     dicyclomine (BENTYL) 10 MG capsule, TAKE ONE CAPSULE BY MOUTH FOUR TIMES A DAY BEFORE MEALS AND AT BEDTIME, Disp: 120 capsule, Rfl: 2    Doxepin HCl 3 MG tablet, , Disp: , Rfl:     Enoxaparin Sodium (LOVENOX) 40 MG/0.4ML solution prefilled syringe syringe, Inject 0.4 mL under the skin into the appropriate area as directed Every 12 (Twelve) Hours for 14 days. Start after surgery unless instructed otherwise, Disp: 11.2 mL, Rfl: 0    ferrous sulfate 325 (65 FE) MG EC tablet, TAKE ONE TABLET BY MOUTH ONCE DAILY WITH BREAKFAST, Disp: 90 tablet, Rfl: 1    folic acid (FOLVITE) 1 MG tablet, Take 1 tablet by mouth Daily., Disp: 60 tablet, Rfl: 2    gabapentin (NEURONTIN) 800 MG tablet, 1 tablet 2 (Two) Times a Day., Disp: , Rfl:     hydroCHLOROthiazide (HYDRODIURIL) 12.5 MG tablet, TAKE ONE TABLET BY MOUTH ONCE DAILY,  Disp: 90 tablet, Rfl: 3    HYDROcodone-acetaminophen (NORCO) 7.5-325 MG per tablet, Take 2 tablets by mouth Every 4 (Four) Hours As Needed., Disp: , Rfl:     hydrOXYzine pamoate (VISTARIL) 50 MG capsule, Take 1 capsule by mouth 3 (Three) Times a Day As Needed., Disp: , Rfl:     lamoTRIgine (LaMICtal) 200 MG tablet, Take 1 tablet by mouth 2 (Two) Times a Day., Disp: 90 tablet, Rfl: 1    lidocaine (XYLOCAINE) 2 % jelly, Apply  topically to the appropriate area as directed As Needed for Mild Pain., Disp: 28 g, Rfl: 0    lisinopril (PRINIVIL,ZESTRIL) 5 MG tablet, TAKE ONE TABLET BY MOUTH ONCE DAILY, Disp: 90 tablet, Rfl: 1    metFORMIN ER (GLUCOPHAGE-XR) 500 MG 24 hr tablet, TAKE ONE TABLET BY MOUTH ONCE DAILY WITH BREAKFAST, Disp: 90 tablet, Rfl: 1    mupirocin (BACTROBAN) 2 % cream, Apply 1 Application topically to the appropriate area as directed 3 (Three) Times a Day., Disp: 30 g, Rfl: 0    spironolactone (ALDACTONE) 25 MG tablet, TAKE ONE TABLET BY MOUTH ONCE DAILY, Disp: 90 tablet, Rfl: 1    Synthroid 137 MCG tablet, TAKE ONE TABLET BY MOUTH EVERY DAY, Disp: 90 tablet, Rfl: 1    Social History     Socioeconomic History    Marital status: Single   Tobacco Use    Smoking status: Every Day     Current packs/day: 0.00     Average packs/day: 0.5 packs/day for 16.0 years (8.0 ttl pk-yrs)     Types: Cigarettes     Start date: 2005     Last attempt to quit: 2021     Years since quittin.8    Smokeless tobacco: Never    Tobacco comments:     VAPES EVERYDAY    Vaping Use    Vaping status: Every Day    Start date: 7/15/2021    Substances: Nicotine, Flavoring    Devices: Disposable   Substance and Sexual Activity    Alcohol use: Not Currently     Comment: CURRENT SOME DAY (rare)    Drug use: Never    Sexual activity: Yes     Partners: Male     Birth control/protection: None       Family History   Problem Relation Age of Onset    Diabetes Mother     Hypertension Mother     Vision loss Mother     Thyroid disease  Mother     Obesity Mother     Cancer Father         Passed 2023    Early death Father         cancer    Thyroid disease Father     Arthritis Sister     Obesity Maternal Aunt     Obesity Maternal Uncle     Arthritis Paternal Grandmother     Cancer Paternal Grandmother     Cancer Paternal Grandfather     COPD Paternal Grandfather        Review of Systems:  Review of Systems   Constitutional:  Positive for fatigue.   Gastrointestinal:  Positive for constipation and diarrhea.   Musculoskeletal:  Positive for arthralgias.   All other systems reviewed and are negative.      Physical Exam:  Vital Signs:  Weight: (!) 140 kg (309 lb)   Body mass index is 42.31 kg/m².  Temp: 97.8 °F (36.6 °C)   Heart Rate: 61   BP: 136/74     Physical Exam  Vitals reviewed.   HENT:      Head: Normocephalic and atraumatic.      Mouth/Throat:      Mouth: Mucous membranes are moist.      Pharynx: Oropharynx is clear.   Eyes:      General: No scleral icterus.     Extraocular Movements: Extraocular movements intact.      Conjunctiva/sclera: Conjunctivae normal.      Pupils: Pupils are equal, round, and reactive to light.   Neck:      Thyroid: No thyromegaly.   Cardiovascular:      Rate and Rhythm: Normal rate.   Pulmonary:      Effort: Pulmonary effort is normal. No respiratory distress.      Breath sounds: Normal breath sounds. No stridor. No wheezing or rhonchi.   Abdominal:      General: Bowel sounds are normal.      Palpations: Abdomen is soft.      Tenderness: There is no abdominal tenderness. There is no right CVA tenderness, left CVA tenderness, guarding or rebound.      Hernia: No hernia is present.   Musculoskeletal:         General: Normal range of motion.      Cervical back: Normal range of motion and neck supple.   Lymphadenopathy:      Cervical: No cervical adenopathy.   Skin:     General: Skin is warm and dry.      Findings: No erythema.   Neurological:      Mental Status: She is alert and oriented to person, place, and time.    Psychiatric:         Mood and Affect: Mood normal.         Behavior: Behavior normal.         Thought Content: Thought content normal.         Judgment: Judgment normal.         Assessment:    Meghan Lui is a 33 y.o. year old female with medically complicated severe obesity with a BMI of Body mass index is 42.31 kg/m². and multiple co-morbidities listed in the encounter diagnosis.    I think she is an appropriate candidate for this surgery, and is ready to proceed.    Plan/Discussion/Summary:  No hiatal hernia per upper GI.  No PPI.  H. pylori negative    The patient has returned to the office for a surgical consultation and has requested to proceed with gastric sleeve.  I have had the opportunity to obtain a history, examine the patient and review the patient's chart. The procedure could be done laparoscopically and or robotically.    The patient understands that surgery is a tool and that weight loss is not guaranteed but only seen in the context of appropriate use, regular follow up, exercise and making appropriate food choices.     I personally discussed the potential complications of the laparoscopic gastric sleeve with this patient.  The patient is well aware of potential complications of the surgery that include but not limited to bleeding, infections, deep vein thrombosis, pulmonary embolism, pulmonary complications such as pneumonia, cardiac event, hernias, small bowel obstruction, damage to the spleen or other organs, bowel injury, disfiguring scars, failure to lose weight, need for additional surgery, conversion to an open procedure and death.  The patient is also aware of complications which apply in particular to the gastric sleeve and can include but not limited to the leakage of gastric contents at the staple line, the development of an intra-abdominal abscess, gastroesophageal reflux disease, Lane's esophagus, ulcers, vitamin/mineral deficiencies, strictures, and the possibility of  converting this procedure to a Rhonda-en-Y gastric bypass. The patient also understands the possibility of requiring an acid reducer medication for the rest of their life.    The risks, benefits, potential complications and alternative therapies were discussed at great length as outlined in our extensive consent forms, online consent and educational teaching processes.    The patient has confirmed the participation in the programs extensive educational activities.    All questions and concerns were answered to patient's satisfaction.  The patient now wishes to proceed with surgery.     The patient has agreed to a postoperative course of anitcoagulant therapy.      I instructed patient that the surgery could be laparoscopically and/or robotically.    I instructed patient to start on a H2 blocker or proton pump inhibitor if not already on one of these medications.    I explained in detail the procedures that are in the consent.  All of these procedures have a chance to convert to open if any technical challenges or complications do occur.  Bariatric surgery is not cosmetic surgery but rather a tool to help a patient make a life-long commitment lifestyle change including diet, exercise, behavior changes, and taking supplemental vitamins and minerals.    Problems after surgery may require more operations to correct them.    The risks, benefits, alternatives, and potential complications of all of the procedures were explained in detail including, but not limited to death, anesthesia and medication adverse effect, deep venous thrombosis, pulmonary embolism, trocar site/incisional hernia, wound infection, abdominal infection, bleeding, failure to lose weight, gain weight, a change in body image, metabolic complications with vitamin deficiences and anemia.    Weight loss expectations were discussed with the patient in detail. The weight loss operations most commonly performed are the sleeve gastrectomy and the Rhonda-en-Y  gastric bypass. These operations result in weight losses up to approximately 25-35% of initial body weight 12 to 24 months after surgery with the gastric bypass usually the higher percent of weight loss but depends on patient using the tool.    For the gastric bypass and loop duodenal switch (JANNETH-S) the risks include but not limited to the following early complications:  Anastomotic leak/peritonitis, Rhonda/Alimentary/biliopancreatic limb obstruction, severe & minor wound infection/seroma, and nausea/vomiting.  Late complications can include but are not limited to malnutrition, vitamin deficiencies, frequent loose stools,  stomal stenosis, marginal ulcer, bowel obstruction, intussusception, internal, and incisional hernia.    Regarding the gastric sleeve, there is higher risk of dysphagia and reflux leading to possible Lane's esophagus compared to a gastric bypass, as well as risk of internal visceral/organ injury, splenectomy, bleeding, infection, leak (which could require further intervention possible conversion to Rhonda-en-Y gastric bypass), stenosis and possibility of regaining weight.    Meghan was counseled regarding diagnostic results, instructions for management, risk factor reductions, prognosis, patient and family education, impressions, risks and benefits of treatment options and importance of compliance with treatment. Total time of the encounter was over 45 minutes counseling the patient regarding the procedure as above and reviewing as well as ordering labs, medications and the procedure.  The chart was also reviewed prior to seeing the patient reviewing previous testing, studies and labs.    Meghan understands the surgical procedures and the different surgical options that are available.  She understands the lifestyle changes that are required after surgery and has agreed to follow the guidelines outlined in the weight management program.  She also expressed understanding of the risks involved  and had all of female questions answered and desires to proceed.      Steve Ferraro MD  4/18/2024

## 2024-04-18 NOTE — DISCHARGE INSTRUCTIONS
Take only the following medications the morning of surgery:     none    Do not take Bariatric Vitamins, Folic Acid, Actigall (if applicable) or Lovenox Injections (if applicable) the morning of surgery.  If you have a history of blood clots or have a BMI greater than 50, Dr. Ferraro may order Lovenox for after surgery. Do not take Lovenox blood thinner before surgery.      General Instructions:    Liquids only the day before surgery.  Drink one 20 ounce Gatorade G2 the evening before surgery.  Nothing red in color.   The morning of surgery have another 20 ounce Gatorade G2.  Again, nothing red in color.  Your drink must be completed 2 hours before your arrival time.   Patients who avoid smoking, chewing tobacco and alcohol for 4 weeks prior to surgery have a reduced risk of post-operative complications.  Quit smoking as many days before surgery as you can.  Do not smoke, use chewing tobacco or drink alcohol the day of surgery.   Bring any papers given to you in the doctor's office.  Wear clean comfortable clothes.  Do not wear contact lenses, false eyelashes or make-up.  Bring a case for your glasses.   Bring crutches or walker if applicable.  Remove all piercings.  Leave jewelry and any other valuables at home.  Remove fingernail polish, gel overlays or any artificial nails.  Hair extensions with metal clips must be removed prior to surgery.  The Pre-Admission Testing nurse will instruct you to bring medications if unable to obtain an accurate list in Pre-Admission Testing.    If you were given a blood bank ID arm band remember to bring it with you the day of surgery.    Preventing a Surgical Site Infection:  For 2 to 3 days before surgery, avoid shaving with a razor because the razor can irritate skin and make it easier to develop an infection.    Any areas of open skin can increase the risk of a post-operative wound infection by allowing bacteria to enter and travel throughout the body.  Notify your surgeon if you  have any skin wounds / rashes even if it is not near the expected surgical site.  The area will need assessed to determine if surgery should be delayed until it is healed.  2 days prior to surgery, take a shower using a fresh bar of anti-bacterial soap (such as Dial).  Use a clean washcloth and dry with a clean towel.    The day prior to surgery, take a shower using a fresh bar of anti-bacterial soap (such as Dial).  Use a clean washcloth and dry with a clean towel.  Sleep in a clean bed with clean clothing.  Do not allow pets to sleep with you.  The morning of surgery shower using a fresh bar of anti-bacterial soap (such as Dial).  Use a clean washcloth and dry with a clean towel.  Follow the Chlorhexidine instructions below.    CHLORHEXIDINE CLOTH INSTRUCTIONS  The morning of surgery follow these instructions using the Chlorhexidine cloths you've been given.  These steps reduce bacteria on the body.  Do not use the cloths near your eyes, ears mouth, genitalia or on open wounds.  Throw the cloths away after use but do not try to flush them down a toilet.    Open and remove one cloth at a time from the package.    Leave the cloth unfolded and begin the bathing.  Massage the skin with the cloths using gentle pressure to remove bacteria.  Do not scrub harshly.   Follow the steps below with one 2% CHG cloth per area (6 total cloths).  One cloth for neck, shoulders and chest.  One cloth for both arms, hands, fingers and underarms (do underarms last).  One cloth for the abdomen followed by groin.  One cloth for right leg and foot including between the toes.  One cloth for left leg and foot including between the toes.  The last cloth is to be used for the back of the neck, back and buttocks.    Allow the CHG to air dry 3 minutes on the skin which will give it time to work and decrease the chance of irritation.  The skin may feel sticky until it is dry.  Do not rinse with water or any other liquid or you will lose the  beneficial effects of the CHG.  If mild skin irritation occurs, do rinse the skin to remove the CHG.  Report this to the nurse at time of admission.  Do not apply lotions, creams, ointments, deodorants or perfumes after using the clothes. Dress in clean clothes before coming to the hospital.    Ask your surgeon if you will be receiving antibiotics prior to surgery.  Make sure you, your family, and all healthcare providers clean their hands with soap and water or an alcohol based hand  before caring for you or your wound.      Day of surgery:4/24/2024     Your arrival time is approximately two hours before your scheduled surgery time.  Upon arrival, a Pre-op nurse and Anesthesiologist will review your health history, obtain vital signs, and answer questions you may have.  A Pre-op nurse will start an IV and you may receive medication in preparation for surgery, including something to help you relax.  If applicable, we do ask that you have your C-PAP/BI-PAP machine available. It can be utilized the night of surgery.     Please be aware that surgery does come with discomfort.  We want to make every effort to control your discomfort so please discuss any uncontrolled symptoms with your nurse.   Your doctor will most likely have prescribed pain medications.      If you are going home after surgery you will receive individualized written care instructions before being discharged.  A responsible adult must drive you to and from the hospital on the day of your surgery and ideally stay with you through the night.   Discharge prescriptions can be filled by the hospital pharmacy during regular pharmacy hours.  If you are having surgery late in the day/evening your prescription may be e-prescribed to your pharmacy.  Please verify your pharmacy hours or chose a 24 hour pharmacy to avoid not having access to your prescription because your pharmacy has closed for the day.    If you are staying overnight following surgery,  you will be transported to your hospital room following the recovery period.  [facility] has all private rooms.    If you have any questions please call Pre-Admission Testing at [phone].  Deductibles and co-payments are collected on the day of service. Please be prepared to pay the required co-pay, deductible or deposit on the day of service as defined by your plan.    Call your surgeon immediately if you experience any of the following symptoms:  Sore Throat  Shortness of Breath or difficulty breathing  Cough  Chills  Body soreness or muscle pain  Headache  Fever  New loss of taste or smell  Do not arrive for your surgery ill.  Your procedure will need to be rescheduled to another time.  You will need to call your physician before the day of surgery to avoid any unnecessary exposure to hospital staff as well as other patients.

## 2024-04-18 NOTE — PATIENT INSTRUCTIONS
Bariatric Manual    You were provided a manual specific to the procedure that you have chosen.  Please refer to that with any questions or call the office at [phone]

## 2024-04-22 RX ORDER — PNV NO.95/FERROUS FUM/FOLIC AC 28MG-0.8MG
TABLET ORAL
Qty: 90 TABLET | Refills: 1 | Status: SHIPPED | OUTPATIENT
Start: 2024-04-22

## 2024-04-23 DIAGNOSIS — M51.36 DDD (DEGENERATIVE DISC DISEASE), LUMBAR: ICD-10-CM

## 2024-04-24 ENCOUNTER — ANESTHESIA (OUTPATIENT)
Dept: PERIOP | Facility: HOSPITAL | Age: 34
End: 2024-04-24
Payer: COMMERCIAL

## 2024-04-24 ENCOUNTER — HOSPITAL ENCOUNTER (INPATIENT)
Facility: HOSPITAL | Age: 34
LOS: 1 days | Discharge: HOME | DRG: 621 | End: 2024-04-24
Attending: SURGERY | Admitting: SURGERY
Payer: COMMERCIAL

## 2024-04-24 ENCOUNTER — ANESTHESIA EVENT (OUTPATIENT)
Dept: PERIOP | Facility: HOSPITAL | Age: 34
End: 2024-04-24
Payer: COMMERCIAL

## 2024-04-24 DIAGNOSIS — E66.01 OBESITY, CLASS III, BMI 40-49.9 (MORBID OBESITY): ICD-10-CM

## 2024-04-24 PROBLEM — K44.9 PARAESOPHAGEAL HERNIA: Status: ACTIVE | Noted: 2024-04-24

## 2024-04-24 PROCEDURE — 25010000002 CEFAZOLIN 3 G RECONSTITUTED SOLUTION 1 EACH VIAL: Performed by: SURGERY

## 2024-04-24 PROCEDURE — 25010000002 MIDAZOLAM PER 1 MG: Performed by: ANESTHESIOLOGY

## 2024-04-24 PROCEDURE — 88307 TISSUE EXAM BY PATHOLOGIST: CPT | Performed by: SURGERY

## 2024-04-24 PROCEDURE — 25010000002 SUGAMMADEX 200 MG/2ML SOLUTION: Performed by: NURSE ANESTHETIST, CERTIFIED REGISTERED

## 2024-04-24 PROCEDURE — 25010000002 AMISULPRIDE (ANTIEMETIC) 5 MG/2ML SOLUTION: Performed by: SURGERY

## 2024-04-24 PROCEDURE — 25010000002 CLONIDINE PER 1 MG: Performed by: SURGERY

## 2024-04-24 PROCEDURE — 25810000003 LACTATED RINGERS PER 1000 ML: Performed by: SURGERY

## 2024-04-24 PROCEDURE — 25010000002 METOCLOPRAMIDE PER 10 MG: Performed by: SURGERY

## 2024-04-24 PROCEDURE — 25810000003 LACTATED RINGERS SOLUTION: Performed by: SURGERY

## 2024-04-24 PROCEDURE — 25010000002 DEXAMETHASONE SODIUM PHOSPHATE 20 MG/5ML SOLUTION: Performed by: NURSE ANESTHETIST, CERTIFIED REGISTERED

## 2024-04-24 PROCEDURE — 25010000002 MAGNESIUM SULFATE PER 500 MG OF MAGNESIUM: Performed by: NURSE ANESTHETIST, CERTIFIED REGISTERED

## 2024-04-24 PROCEDURE — 25010000002 LABETALOL 5 MG/ML SOLUTION: Performed by: NURSE ANESTHETIST, CERTIFIED REGISTERED

## 2024-04-24 PROCEDURE — 25010000002 ENOXAPARIN PER 10 MG: Performed by: SURGERY

## 2024-04-24 PROCEDURE — C1713 ANCHOR/SCREW BN/BN,TIS/BN: HCPCS | Performed by: SURGERY

## 2024-04-24 PROCEDURE — 25010000002 ACETAMINOPHEN 10 MG/ML SOLUTION: Performed by: NURSE ANESTHETIST, CERTIFIED REGISTERED

## 2024-04-24 PROCEDURE — 25810000003 SODIUM CHLORIDE PER 500 ML: Performed by: SURGERY

## 2024-04-24 PROCEDURE — 25010000002 KETOROLAC TROMETHAMINE PER 15 MG: Performed by: SURGERY

## 2024-04-24 PROCEDURE — 25010000002 ONDANSETRON PER 1 MG: Performed by: NURSE ANESTHETIST, CERTIFIED REGISTERED

## 2024-04-24 PROCEDURE — 0BQT4ZZ REPAIR DIAPHRAGM, PERCUTANEOUS ENDOSCOPIC APPROACH: ICD-10-PCS | Performed by: SURGERY

## 2024-04-24 PROCEDURE — 25010000002 HYDROMORPHONE PER 4 MG: Performed by: SURGERY

## 2024-04-24 PROCEDURE — 25010000002 ROPIVACAINE PER 1 MG: Performed by: SURGERY

## 2024-04-24 PROCEDURE — 25010000002 HYDROMORPHONE PER 4 MG: Performed by: NURSE ANESTHETIST, CERTIFIED REGISTERED

## 2024-04-24 PROCEDURE — 25010000002 FENTANYL CITRATE (PF) 50 MCG/ML SOLUTION: Performed by: NURSE ANESTHETIST, CERTIFIED REGISTERED

## 2024-04-24 PROCEDURE — 0DB64Z3 EXCISION OF STOMACH, PERCUTANEOUS ENDOSCOPIC APPROACH, VERTICAL: ICD-10-PCS | Performed by: SURGERY

## 2024-04-24 PROCEDURE — 25810000003 LACTATED RINGERS PER 1000 ML: Performed by: ANESTHESIOLOGY

## 2024-04-24 PROCEDURE — 43775 LAP SLEEVE GASTRECTOMY: CPT | Performed by: SURGERY

## 2024-04-24 PROCEDURE — 25010000002 EPINEPHRINE 1 MG/ML SOLUTION 30 ML VIAL: Performed by: SURGERY

## 2024-04-24 PROCEDURE — 25010000002 PROPOFOL 10 MG/ML EMULSION: Performed by: NURSE ANESTHETIST, CERTIFIED REGISTERED

## 2024-04-24 PROCEDURE — 25010000002 PROPOFOL 200 MG/20ML EMULSION: Performed by: NURSE ANESTHETIST, CERTIFIED REGISTERED

## 2024-04-24 DEVICE — STPLR GASTRC/SLV TITANSGS NON/ARTICULR PWR W/SEAMGUARD 23CM: Type: IMPLANTABLE DEVICE | Site: ABDOMEN | Status: FUNCTIONAL

## 2024-04-24 RX ORDER — EPHEDRINE SULFATE 50 MG/ML
5 INJECTION, SOLUTION INTRAVENOUS ONCE AS NEEDED
Status: DISCONTINUED | OUTPATIENT
Start: 2024-04-24 | End: 2024-04-24 | Stop reason: HOSPADM

## 2024-04-24 RX ORDER — PROMETHAZINE HYDROCHLORIDE 12.5 MG/1
12.5 TABLET ORAL EVERY 4 HOURS PRN
Status: DISCONTINUED | OUTPATIENT
Start: 2024-04-24 | End: 2024-04-25 | Stop reason: HOSPADM

## 2024-04-24 RX ORDER — HYDROCODONE BITARTRATE AND ACETAMINOPHEN 5; 325 MG/1; MG/1
1 TABLET ORAL ONCE AS NEEDED
Status: DISCONTINUED | OUTPATIENT
Start: 2024-04-24 | End: 2024-04-24 | Stop reason: HOSPADM

## 2024-04-24 RX ORDER — FLUMAZENIL 0.1 MG/ML
0.2 INJECTION INTRAVENOUS AS NEEDED
Status: DISCONTINUED | OUTPATIENT
Start: 2024-04-24 | End: 2024-04-24 | Stop reason: HOSPADM

## 2024-04-24 RX ORDER — MIDAZOLAM HYDROCHLORIDE 1 MG/ML
1 INJECTION INTRAMUSCULAR; INTRAVENOUS
Status: DISCONTINUED | OUTPATIENT
Start: 2024-04-24 | End: 2024-04-24 | Stop reason: HOSPADM

## 2024-04-24 RX ORDER — FENTANYL CITRATE 50 UG/ML
INJECTION, SOLUTION INTRAMUSCULAR; INTRAVENOUS AS NEEDED
Status: DISCONTINUED | OUTPATIENT
Start: 2024-04-24 | End: 2024-04-24 | Stop reason: SURG

## 2024-04-24 RX ORDER — NALOXONE HCL 0.4 MG/ML
0.2 VIAL (ML) INJECTION AS NEEDED
Status: DISCONTINUED | OUTPATIENT
Start: 2024-04-24 | End: 2024-04-24 | Stop reason: HOSPADM

## 2024-04-24 RX ORDER — LEVOTHYROXINE SODIUM 137 UG/1
137 TABLET ORAL
Status: DISCONTINUED | OUTPATIENT
Start: 2024-04-25 | End: 2024-04-25 | Stop reason: HOSPADM

## 2024-04-24 RX ORDER — ENOXAPARIN SODIUM 100 MG/ML
40 INJECTION SUBCUTANEOUS ONCE
Status: COMPLETED | OUTPATIENT
Start: 2024-04-25 | End: 2024-04-25

## 2024-04-24 RX ORDER — LIDOCAINE HYDROCHLORIDE 20 MG/ML
INJECTION, SOLUTION EPIDURAL; INFILTRATION; INTRACAUDAL; PERINEURAL AS NEEDED
Status: DISCONTINUED | OUTPATIENT
Start: 2024-04-24 | End: 2024-04-24 | Stop reason: SURG

## 2024-04-24 RX ORDER — ONDANSETRON 2 MG/ML
4 INJECTION INTRAMUSCULAR; INTRAVENOUS ONCE AS NEEDED
Status: DISCONTINUED | OUTPATIENT
Start: 2024-04-24 | End: 2024-04-24 | Stop reason: HOSPADM

## 2024-04-24 RX ORDER — PROCHLORPERAZINE EDISYLATE 5 MG/ML
10 INJECTION INTRAMUSCULAR; INTRAVENOUS EVERY 6 HOURS PRN
Status: DISCONTINUED | OUTPATIENT
Start: 2024-04-24 | End: 2024-04-25 | Stop reason: HOSPADM

## 2024-04-24 RX ORDER — FAMOTIDINE 10 MG/ML
20 INJECTION, SOLUTION INTRAVENOUS EVERY 12 HOURS SCHEDULED
Status: DISCONTINUED | OUTPATIENT
Start: 2024-04-24 | End: 2024-04-25 | Stop reason: HOSPADM

## 2024-04-24 RX ORDER — SODIUM CHLORIDE, SODIUM LACTATE, POTASSIUM CHLORIDE, CALCIUM CHLORIDE 600; 310; 30; 20 MG/100ML; MG/100ML; MG/100ML; MG/100ML
150 INJECTION, SOLUTION INTRAVENOUS CONTINUOUS
Status: DISCONTINUED | OUTPATIENT
Start: 2024-04-24 | End: 2024-04-25 | Stop reason: HOSPADM

## 2024-04-24 RX ORDER — SODIUM CHLORIDE 0.9 % (FLUSH) 0.9 %
3-10 SYRINGE (ML) INJECTION AS NEEDED
Status: DISCONTINUED | OUTPATIENT
Start: 2024-04-24 | End: 2024-04-24 | Stop reason: HOSPADM

## 2024-04-24 RX ORDER — ACETAMINOPHEN 160 MG/5ML
975 SOLUTION ORAL EVERY 6 HOURS
Status: DISCONTINUED | OUTPATIENT
Start: 2024-04-24 | End: 2024-04-25 | Stop reason: HOSPADM

## 2024-04-24 RX ORDER — METOCLOPRAMIDE HYDROCHLORIDE 5 MG/ML
10 INJECTION INTRAMUSCULAR; INTRAVENOUS ONCE
Status: COMPLETED | OUTPATIENT
Start: 2024-04-24 | End: 2024-04-24

## 2024-04-24 RX ORDER — ONDANSETRON 2 MG/ML
INJECTION INTRAMUSCULAR; INTRAVENOUS AS NEEDED
Status: DISCONTINUED | OUTPATIENT
Start: 2024-04-24 | End: 2024-04-24 | Stop reason: SURG

## 2024-04-24 RX ORDER — HYDROMORPHONE HYDROCHLORIDE 1 MG/ML
0.5 INJECTION, SOLUTION INTRAMUSCULAR; INTRAVENOUS; SUBCUTANEOUS
Status: DISCONTINUED | OUTPATIENT
Start: 2024-04-24 | End: 2024-04-25 | Stop reason: HOSPADM

## 2024-04-24 RX ORDER — ACETAMINOPHEN 500 MG
1000 TABLET ORAL EVERY 6 HOURS
Qty: 16 TABLET | Refills: 0 | Status: DISCONTINUED | OUTPATIENT
Start: 2024-04-24 | End: 2024-04-25 | Stop reason: HOSPADM

## 2024-04-24 RX ORDER — PROMETHAZINE HYDROCHLORIDE 25 MG/1
25 SUPPOSITORY RECTAL ONCE AS NEEDED
Status: DISCONTINUED | OUTPATIENT
Start: 2024-04-24 | End: 2024-04-24 | Stop reason: HOSPADM

## 2024-04-24 RX ORDER — SODIUM CHLORIDE 9 MG/ML
INJECTION, SOLUTION INTRAVENOUS AS NEEDED
Status: DISCONTINUED | OUTPATIENT
Start: 2024-04-24 | End: 2024-04-24 | Stop reason: HOSPADM

## 2024-04-24 RX ORDER — PANTOPRAZOLE SODIUM 40 MG/10ML
40 INJECTION, POWDER, LYOPHILIZED, FOR SOLUTION INTRAVENOUS ONCE
Status: COMPLETED | OUTPATIENT
Start: 2024-04-24 | End: 2024-04-24

## 2024-04-24 RX ORDER — SCOLOPAMINE TRANSDERMAL SYSTEM 1 MG/1
1 PATCH, EXTENDED RELEASE TRANSDERMAL CONTINUOUS
Status: DISCONTINUED | OUTPATIENT
Start: 2024-04-24 | End: 2024-04-25 | Stop reason: HOSPADM

## 2024-04-24 RX ORDER — CLONIDINE HYDROCHLORIDE 0.1 MG/1
0.1 TABLET ORAL NIGHTLY
Status: DISCONTINUED | OUTPATIENT
Start: 2024-04-24 | End: 2024-04-25 | Stop reason: HOSPADM

## 2024-04-24 RX ORDER — FENTANYL CITRATE 50 UG/ML
50 INJECTION, SOLUTION INTRAMUSCULAR; INTRAVENOUS
Status: DISCONTINUED | OUTPATIENT
Start: 2024-04-24 | End: 2024-04-24 | Stop reason: HOSPADM

## 2024-04-24 RX ORDER — MAGNESIUM SULFATE HEPTAHYDRATE 500 MG/ML
INJECTION, SOLUTION INTRAMUSCULAR; INTRAVENOUS AS NEEDED
Status: DISCONTINUED | OUTPATIENT
Start: 2024-04-24 | End: 2024-04-24 | Stop reason: SURG

## 2024-04-24 RX ORDER — SODIUM CHLORIDE, SODIUM LACTATE, POTASSIUM CHLORIDE, CALCIUM CHLORIDE 600; 310; 30; 20 MG/100ML; MG/100ML; MG/100ML; MG/100ML
100 INJECTION, SOLUTION INTRAVENOUS CONTINUOUS
Status: DISCONTINUED | OUTPATIENT
Start: 2024-04-24 | End: 2024-04-24

## 2024-04-24 RX ORDER — ROCURONIUM BROMIDE 10 MG/ML
INJECTION, SOLUTION INTRAVENOUS AS NEEDED
Status: DISCONTINUED | OUTPATIENT
Start: 2024-04-24 | End: 2024-04-24 | Stop reason: SURG

## 2024-04-24 RX ORDER — SODIUM CHLORIDE 0.9 % (FLUSH) 0.9 %
3 SYRINGE (ML) INJECTION EVERY 12 HOURS SCHEDULED
Status: DISCONTINUED | OUTPATIENT
Start: 2024-04-24 | End: 2024-04-24 | Stop reason: HOSPADM

## 2024-04-24 RX ORDER — DEXAMETHASONE SODIUM PHOSPHATE 4 MG/ML
INJECTION, SOLUTION INTRA-ARTICULAR; INTRALESIONAL; INTRAMUSCULAR; INTRAVENOUS; SOFT TISSUE AS NEEDED
Status: DISCONTINUED | OUTPATIENT
Start: 2024-04-24 | End: 2024-04-24 | Stop reason: SURG

## 2024-04-24 RX ORDER — PROMETHAZINE HYDROCHLORIDE 25 MG/1
25 TABLET ORAL ONCE AS NEEDED
Status: DISCONTINUED | OUTPATIENT
Start: 2024-04-24 | End: 2024-04-24 | Stop reason: HOSPADM

## 2024-04-24 RX ORDER — LISINOPRIL 5 MG/1
5 TABLET ORAL DAILY
Status: DISCONTINUED | OUTPATIENT
Start: 2024-04-24 | End: 2024-04-25 | Stop reason: HOSPADM

## 2024-04-24 RX ORDER — IPRATROPIUM BROMIDE AND ALBUTEROL SULFATE 2.5; .5 MG/3ML; MG/3ML
3 SOLUTION RESPIRATORY (INHALATION) ONCE AS NEEDED
Status: DISCONTINUED | OUTPATIENT
Start: 2024-04-24 | End: 2024-04-24 | Stop reason: HOSPADM

## 2024-04-24 RX ORDER — METOCLOPRAMIDE HYDROCHLORIDE 5 MG/ML
10 INJECTION INTRAMUSCULAR; INTRAVENOUS EVERY 6 HOURS
Status: DISCONTINUED | OUTPATIENT
Start: 2024-04-24 | End: 2024-04-25 | Stop reason: HOSPADM

## 2024-04-24 RX ORDER — CYANOCOBALAMIN 1000 UG/ML
1000 INJECTION, SOLUTION INTRAMUSCULAR; SUBCUTANEOUS ONCE
Status: COMPLETED | OUTPATIENT
Start: 2024-04-25 | End: 2024-04-25

## 2024-04-24 RX ORDER — PROPOFOL 10 MG/ML
INJECTION, EMULSION INTRAVENOUS AS NEEDED
Status: DISCONTINUED | OUTPATIENT
Start: 2024-04-24 | End: 2024-04-24 | Stop reason: SURG

## 2024-04-24 RX ORDER — DROPERIDOL 2.5 MG/ML
0.62 INJECTION, SOLUTION INTRAMUSCULAR; INTRAVENOUS
Status: DISCONTINUED | OUTPATIENT
Start: 2024-04-24 | End: 2024-04-24 | Stop reason: HOSPADM

## 2024-04-24 RX ORDER — GABAPENTIN 300 MG/1
300 CAPSULE ORAL ONCE
Status: COMPLETED | OUTPATIENT
Start: 2024-04-24 | End: 2024-04-24

## 2024-04-24 RX ORDER — ONDANSETRON 2 MG/ML
4 INJECTION INTRAMUSCULAR; INTRAVENOUS EVERY 4 HOURS PRN
Status: DISCONTINUED | OUTPATIENT
Start: 2024-04-24 | End: 2024-04-25 | Stop reason: HOSPADM

## 2024-04-24 RX ORDER — HYDRALAZINE HYDROCHLORIDE 20 MG/ML
5 INJECTION INTRAMUSCULAR; INTRAVENOUS
Status: DISCONTINUED | OUTPATIENT
Start: 2024-04-24 | End: 2024-04-24 | Stop reason: HOSPADM

## 2024-04-24 RX ORDER — PROMETHAZINE HYDROCHLORIDE 25 MG/1
12.5 TABLET ORAL ONCE AS NEEDED
Status: DISCONTINUED | OUTPATIENT
Start: 2024-04-24 | End: 2024-04-24 | Stop reason: HOSPADM

## 2024-04-24 RX ORDER — LABETALOL HYDROCHLORIDE 5 MG/ML
INJECTION, SOLUTION INTRAVENOUS AS NEEDED
Status: DISCONTINUED | OUTPATIENT
Start: 2024-04-24 | End: 2024-04-24 | Stop reason: SURG

## 2024-04-24 RX ORDER — HYDROMORPHONE HYDROCHLORIDE 1 MG/ML
0.5 INJECTION, SOLUTION INTRAMUSCULAR; INTRAVENOUS; SUBCUTANEOUS
Status: DISCONTINUED | OUTPATIENT
Start: 2024-04-24 | End: 2024-04-24 | Stop reason: HOSPADM

## 2024-04-24 RX ORDER — PROMETHAZINE HYDROCHLORIDE 12.5 MG/1
12.5 SUPPOSITORY RECTAL EVERY 4 HOURS PRN
Status: DISCONTINUED | OUTPATIENT
Start: 2024-04-24 | End: 2024-04-25 | Stop reason: HOSPADM

## 2024-04-24 RX ORDER — KETAMINE HCL IN NACL, ISO-OSM 100MG/10ML
SYRINGE (ML) INJECTION AS NEEDED
Status: DISCONTINUED | OUTPATIENT
Start: 2024-04-24 | End: 2024-04-24 | Stop reason: SURG

## 2024-04-24 RX ORDER — ACETAMINOPHEN 10 MG/ML
INJECTION, SOLUTION INTRAVENOUS AS NEEDED
Status: DISCONTINUED | OUTPATIENT
Start: 2024-04-24 | End: 2024-04-24 | Stop reason: SURG

## 2024-04-24 RX ORDER — ALBUTEROL SULFATE 2.5 MG/3ML
2.5 SOLUTION RESPIRATORY (INHALATION) EVERY 4 HOURS PRN
Status: DISCONTINUED | OUTPATIENT
Start: 2024-04-24 | End: 2024-04-25 | Stop reason: HOSPADM

## 2024-04-24 RX ORDER — DIPHENHYDRAMINE HYDROCHLORIDE 50 MG/ML
12.5 INJECTION INTRAMUSCULAR; INTRAVENOUS
Status: DISCONTINUED | OUTPATIENT
Start: 2024-04-24 | End: 2024-04-24 | Stop reason: HOSPADM

## 2024-04-24 RX ORDER — CHLORHEXIDINE GLUCONATE ORAL RINSE 1.2 MG/ML
15 SOLUTION DENTAL SEE ADMIN INSTRUCTIONS
Status: COMPLETED | OUTPATIENT
Start: 2024-04-24 | End: 2024-04-24

## 2024-04-24 RX ORDER — SODIUM CHLORIDE, SODIUM LACTATE, POTASSIUM CHLORIDE, CALCIUM CHLORIDE 600; 310; 30; 20 MG/100ML; MG/100ML; MG/100ML; MG/100ML
9 INJECTION, SOLUTION INTRAVENOUS CONTINUOUS
Status: DISCONTINUED | OUTPATIENT
Start: 2024-04-24 | End: 2024-04-24

## 2024-04-24 RX ORDER — SODIUM CHLORIDE 9 MG/ML
40 INJECTION, SOLUTION INTRAVENOUS AS NEEDED
Status: DISCONTINUED | OUTPATIENT
Start: 2024-04-24 | End: 2024-04-24 | Stop reason: HOSPADM

## 2024-04-24 RX ORDER — LORAZEPAM 1 MG/1
1 TABLET ORAL EVERY 12 HOURS PRN
Status: DISCONTINUED | OUTPATIENT
Start: 2024-04-24 | End: 2024-04-25 | Stop reason: HOSPADM

## 2024-04-24 RX ORDER — HYDROMORPHONE HYDROCHLORIDE 4 MG/1
2 TABLET ORAL EVERY 4 HOURS PRN
Status: DISCONTINUED | OUTPATIENT
Start: 2024-04-24 | End: 2024-04-25 | Stop reason: HOSPADM

## 2024-04-24 RX ORDER — DIPHENHYDRAMINE HYDROCHLORIDE 50 MG/ML
25 INJECTION INTRAMUSCULAR; INTRAVENOUS EVERY 4 HOURS PRN
Status: DISCONTINUED | OUTPATIENT
Start: 2024-04-24 | End: 2024-04-25 | Stop reason: HOSPADM

## 2024-04-24 RX ORDER — OXYCODONE AND ACETAMINOPHEN 7.5; 325 MG/1; MG/1
1 TABLET ORAL EVERY 4 HOURS PRN
Status: DISCONTINUED | OUTPATIENT
Start: 2024-04-24 | End: 2024-04-24 | Stop reason: HOSPADM

## 2024-04-24 RX ORDER — NALOXONE HCL 0.4 MG/ML
0.1 VIAL (ML) INJECTION
Status: DISCONTINUED | OUTPATIENT
Start: 2024-04-24 | End: 2024-04-25 | Stop reason: HOSPADM

## 2024-04-24 RX ORDER — MAGNESIUM HYDROXIDE 1200 MG/15ML
LIQUID ORAL AS NEEDED
Status: DISCONTINUED | OUTPATIENT
Start: 2024-04-24 | End: 2024-04-24 | Stop reason: HOSPADM

## 2024-04-24 RX ORDER — ENOXAPARIN SODIUM 100 MG/ML
40 INJECTION SUBCUTANEOUS ONCE
Status: COMPLETED | OUTPATIENT
Start: 2024-04-24 | End: 2024-04-24

## 2024-04-24 RX ORDER — LABETALOL HYDROCHLORIDE 5 MG/ML
5 INJECTION, SOLUTION INTRAVENOUS
Status: DISCONTINUED | OUTPATIENT
Start: 2024-04-24 | End: 2024-04-24 | Stop reason: HOSPADM

## 2024-04-24 RX ORDER — TRAMADOL HYDROCHLORIDE 50 MG/1
50 TABLET ORAL EVERY 4 HOURS PRN
Status: DISCONTINUED | OUTPATIENT
Start: 2024-04-24 | End: 2024-04-25 | Stop reason: HOSPADM

## 2024-04-24 RX ORDER — ONDANSETRON 4 MG/1
4 TABLET, ORALLY DISINTEGRATING ORAL EVERY 4 HOURS PRN
Status: DISCONTINUED | OUTPATIENT
Start: 2024-04-24 | End: 2024-04-25 | Stop reason: HOSPADM

## 2024-04-24 RX ORDER — HYDRALAZINE HYDROCHLORIDE 20 MG/ML
10 INJECTION INTRAMUSCULAR; INTRAVENOUS
Status: DISCONTINUED | OUTPATIENT
Start: 2024-04-24 | End: 2024-04-25 | Stop reason: HOSPADM

## 2024-04-24 RX ADMIN — GABAPENTIN 300 MG: 300 CAPSULE ORAL at 06:52

## 2024-04-24 RX ADMIN — ROCURONIUM BROMIDE 50 MG: 10 INJECTION, SOLUTION INTRAVENOUS at 07:51

## 2024-04-24 RX ADMIN — METOCLOPRAMIDE 10 MG: 5 INJECTION, SOLUTION INTRAMUSCULAR; INTRAVENOUS at 07:32

## 2024-04-24 RX ADMIN — METOCLOPRAMIDE 10 MG: 5 INJECTION, SOLUTION INTRAMUSCULAR; INTRAVENOUS at 18:04

## 2024-04-24 RX ADMIN — SODIUM CHLORIDE, POTASSIUM CHLORIDE, SODIUM LACTATE AND CALCIUM CHLORIDE 150 ML/HR: 600; 310; 30; 20 INJECTION, SOLUTION INTRAVENOUS at 13:57

## 2024-04-24 RX ADMIN — PROPOFOL 175 MCG/KG/MIN: 10 INJECTION, EMULSION INTRAVENOUS at 07:51

## 2024-04-24 RX ADMIN — ACETAMINOPHEN 1000 MG: 500 TABLET ORAL at 18:04

## 2024-04-24 RX ADMIN — FAMOTIDINE 20 MG: 10 INJECTION INTRAVENOUS at 20:31

## 2024-04-24 RX ADMIN — HYDROMORPHONE HYDROCHLORIDE 0.5 MG: 1 INJECTION, SOLUTION INTRAMUSCULAR; INTRAVENOUS; SUBCUTANEOUS at 11:30

## 2024-04-24 RX ADMIN — ROCURONIUM BROMIDE 20 MG: 10 INJECTION, SOLUTION INTRAVENOUS at 08:30

## 2024-04-24 RX ADMIN — PROPOFOL 150 MG: 10 INJECTION, EMULSION INTRAVENOUS at 07:51

## 2024-04-24 RX ADMIN — ACETAMINOPHEN 1000 MG: 1000 INJECTION INTRAVENOUS at 08:40

## 2024-04-24 RX ADMIN — HYDROMORPHONE HYDROCHLORIDE 2 MG: 4 TABLET ORAL at 20:31

## 2024-04-24 RX ADMIN — FENTANYL CITRATE 25 MCG: 50 INJECTION, SOLUTION INTRAMUSCULAR; INTRAVENOUS at 08:58

## 2024-04-24 RX ADMIN — SODIUM CHLORIDE, POTASSIUM CHLORIDE, SODIUM LACTATE AND CALCIUM CHLORIDE 100 ML/HR: 600; 310; 30; 20 INJECTION, SOLUTION INTRAVENOUS at 09:04

## 2024-04-24 RX ADMIN — SUGAMMADEX 300 MG: 100 INJECTION, SOLUTION INTRAVENOUS at 08:47

## 2024-04-24 RX ADMIN — SODIUM CHLORIDE, POTASSIUM CHLORIDE, SODIUM LACTATE AND CALCIUM CHLORIDE: 600; 310; 30; 20 INJECTION, SOLUTION INTRAVENOUS at 08:54

## 2024-04-24 RX ADMIN — AMISULPRIDE 10 MG: 2.5 INJECTION, SOLUTION INTRAVENOUS at 09:26

## 2024-04-24 RX ADMIN — HYOSCYAMINE SULFATE 125 MCG: 0.12 TABLET ORAL; SUBLINGUAL at 19:19

## 2024-04-24 RX ADMIN — SCOPALAMINE 1 PATCH: 1 PATCH, EXTENDED RELEASE TRANSDERMAL at 06:52

## 2024-04-24 RX ADMIN — CLONIDINE HYDROCHLORIDE 0.1 MG: 0.1 TABLET ORAL at 20:31

## 2024-04-24 RX ADMIN — LIDOCAINE HYDROCHLORIDE 100 MG: 20 INJECTION, SOLUTION EPIDURAL; INFILTRATION; INTRACAUDAL; PERINEURAL at 07:51

## 2024-04-24 RX ADMIN — FENTANYL CITRATE 50 MCG: 50 INJECTION, SOLUTION INTRAMUSCULAR; INTRAVENOUS at 08:02

## 2024-04-24 RX ADMIN — LABETALOL HYDROCHLORIDE 10 MG: 5 INJECTION, SOLUTION INTRAVENOUS at 08:17

## 2024-04-24 RX ADMIN — METOCLOPRAMIDE 10 MG: 5 INJECTION, SOLUTION INTRAMUSCULAR; INTRAVENOUS at 11:54

## 2024-04-24 RX ADMIN — DEXAMETHASONE SODIUM PHOSPHATE 10 MG: 4 INJECTION, SOLUTION INTRAMUSCULAR; INTRAVENOUS at 08:00

## 2024-04-24 RX ADMIN — ENOXAPARIN SODIUM 40 MG: 100 INJECTION SUBCUTANEOUS at 09:03

## 2024-04-24 RX ADMIN — SODIUM CHLORIDE 3000 MG: 900 INJECTION INTRAVENOUS at 07:41

## 2024-04-24 RX ADMIN — PROPOFOL 50 MG: 10 INJECTION, EMULSION INTRAVENOUS at 08:17

## 2024-04-24 RX ADMIN — FAMOTIDINE 20 MG: 10 INJECTION INTRAVENOUS at 11:54

## 2024-04-24 RX ADMIN — ACETAMINOPHEN 1000 MG: 500 TABLET ORAL at 11:54

## 2024-04-24 RX ADMIN — Medication 30 MG: at 08:08

## 2024-04-24 RX ADMIN — SODIUM CHLORIDE, POTASSIUM CHLORIDE, SODIUM LACTATE AND CALCIUM CHLORIDE 500 ML: 600; 310; 30; 20 INJECTION, SOLUTION INTRAVENOUS at 06:55

## 2024-04-24 RX ADMIN — 0.12% CHLORHEXIDINE GLUCONATE 15 ML: 1.2 RINSE ORAL at 06:52

## 2024-04-24 RX ADMIN — MAGNESIUM SULFATE HEPTAHYDRATE 2 G: 500 INJECTION, SOLUTION INTRAMUSCULAR; INTRAVENOUS at 08:02

## 2024-04-24 RX ADMIN — HYOSCYAMINE SULFATE 125 MCG: 0.12 TABLET ORAL; SUBLINGUAL at 09:18

## 2024-04-24 RX ADMIN — LISINOPRIL 5 MG: 5 TABLET ORAL at 11:54

## 2024-04-24 RX ADMIN — PANTOPRAZOLE SODIUM 40 MG: 40 INJECTION, POWDER, FOR SOLUTION INTRAVENOUS at 06:54

## 2024-04-24 RX ADMIN — FENTANYL CITRATE 25 MCG: 50 INJECTION, SOLUTION INTRAMUSCULAR; INTRAVENOUS at 08:54

## 2024-04-24 RX ADMIN — Medication 20 MG: at 08:17

## 2024-04-24 RX ADMIN — FENTANYL CITRATE 50 MCG: 50 INJECTION, SOLUTION INTRAMUSCULAR; INTRAVENOUS at 09:40

## 2024-04-24 RX ADMIN — LABETALOL HYDROCHLORIDE 5 MG: 5 INJECTION, SOLUTION INTRAVENOUS at 08:31

## 2024-04-24 RX ADMIN — ONDANSETRON 4 MG: 2 INJECTION INTRAMUSCULAR; INTRAVENOUS at 08:36

## 2024-04-24 RX ADMIN — MIDAZOLAM 1 MG: 1 INJECTION INTRAMUSCULAR; INTRAVENOUS at 07:31

## 2024-04-24 RX ADMIN — HYDROMORPHONE HYDROCHLORIDE 2 MG: 4 TABLET ORAL at 13:41

## 2024-04-24 RX ADMIN — HYDROMORPHONE HYDROCHLORIDE 0.5 MG: 1 INJECTION, SOLUTION INTRAMUSCULAR; INTRAVENOUS; SUBCUTANEOUS at 09:02

## 2024-04-24 RX ADMIN — ROCURONIUM BROMIDE 20 MG: 10 INJECTION, SOLUTION INTRAVENOUS at 08:08

## 2024-04-24 NOTE — OP NOTE
PREOPERATIVE DIAGNOSIS:  Morbid obesity with multiple comorbidities as referenced in the most recent history and physical.    POSTOPERATIVE DIAGNOSIS:  Morbid obesity with multiple comorbidities as referenced in the most recent history and physical.  Paraesophageal hernia    PROCEDURES PERFORMED:  1.  Laparoscopic sleeve gastrectomy with Titan stapler # [id number] with buttress  2.  Laparoscopic paraesophageal hernia repair    SURGEON:  Steve Ferraro Jr., MD    ASSISTANT: ASTER David      Surgery assisted and facilitated by a certified physician assistant, who directly resulted in a decreased operative time, anesthetic time, wound exposure, and possibly of an operative wound infection, thereby decreasing patient morbidity and ultimately total expenditures.  The surgical assistant assisted in placement of trochars, take down of the gastrocolic omentum, short gastric vessels and dissection at the angle of His.  Also assisted in retraction of the stomach during stapling so as not to kink the gastric sleeve.  Also assisted in removing of the gastric specimen, closure of the fascial defect as well as closure of the skin incisions.    ANESTHESIA:  General endotracheal and TAP block with Ropivacaine mixture    ESTIMATED BLOOD LOSS:   Less than 25 mL unless dictated below.    FLUIDS:  Crystalloids.    SPECIMENS:  Gastric remnant    DRAINS:  None.    COUNTS:  Correct.    COMPLICATIONS:  None.    INDICATIONS:  This patient with morbid obesity and associated comorbidities presents for elective laparoscopic, possible open sleeve gastrectomy.  The patient has received medical clearance to proceed.  The patient has undergone our extensive educational process and consent process and wishes to proceed.    DESCRIPTION OF PROCEDURE:  The patient was brought to the operating room and placed supine upon the operating room table. SCD hose were placed.  The patient underwent uneventful general endotracheal anesthesia per the  anesthesiology staff. The abdomen was prepped with ChloraPrep and draped in the usual sterile fashion.  An Ioban was used as well if not allergic.  Anesthesia staff then passed a 38-Fr balloon bougie into the stomach to decompress.  A 5-10 mm transverse incision was made a few centimeters above and to the left of the umbilicus and the peritoneal cavity entered under direct camera visualization using a 5 or 10 mm 0° laparoscope and an Optiview trocar.  The abdomen was then insufflated to a pressure of 15 mmHg with CO2 gas.  Exploratory laparoscopy revealed no evidence of injury from the entrance technique and no significant abnormalities unless addendum dictated below.  An angled laparoscope was then used.  The patient was placed in reverse Trendelenburg position.  Under direct camera visualization a 19 mm trocar was placed in the right lateral subcostal position.  A 5 mm trocar was placed in the right midabdominal position.  A 5 mm trocar was placed in the left midabdominal position. A Sarah retractor was placed through an epigastric incision and used to elevate the left lobe of the liver.  The fat pad was elevated and the left jyoti exposed.  At this point, approximately care home along the greater curvature, the gastrocolic omentum was divided with the Enseal and this proceeded superiorly to the angle of His taking down the short gastric vessels.  All posterior attachments of the lesser sac and posterior aspect of the stomach to the pancreas were taken down as well.  The left jyoti was exposed along its length.  Dissection then proceeded medially taking down the greater curvature with an Enseal until just proximal to the pylorus.  The 38 Jordanian bougie was then directed distally into the stomach to the pylorus.  The balloon was inflated with 15 cc of saline.  The stomach was marked in the 3 positions using indelible ink.  The 3 markings were at the angle of Hiss, the incisura and antrum using 1cm, 3cm and 4-5cm  respectively.  The Titan stapler was then placed through the 19 mm trocar into the abdomen and opened up and the stomach pulled in to the markings on the stomach.  Careful attention was made to stay 1 cm from the esophagus.  Positive pressure retraction was then used to size the stomach perfectly.  The balloon on the bougie was then deflated and placed to suction prior to closing the stapler.  The stapler was then fired and then removed after completion.  Areas of the staple line were oversewn with absorbable sutures as needed for bleeding or questionable staple lines.  At this point, the gastrectomy specimen was withdrawn through the 12 mm trocar site incision. The specimen staple line was inspected and was intact.  The specimen was then sent off to pathology.  At this point, the sleeve was submerged under saline and using the bougie to insufflate the stomach, a leak test was performed.  This revealed the sleeve to be watertight, no air bubbles, no leak, and no bleeding seen from the staple lines and no significant abnormalities.  Irrigation fluid from the abdomen was then suctioned.  The gastric sleeve staple line was then treated with fibrin glue if buttress was not used. The fascia at the 19 mm trocar site incision was closed with a single 0 Vicryl suture in a figure-of-eight fashion placed under direct laparoscopic camera visualization with a suture passer and tying the knot extracorporeally.  The fascia in the area was infiltrated with local anesthesia. All incisions were then infiltrated with local anesthetic. The remaining trocars were removed under direct camera visualization with no bleeding noted from their sites.  The abdomen was desufflated of gas. The skin in each incision was closed using 4-0 antibiotic impregnated Monocryl in a subcuticular fashion followed by Dermabond.  The patient tolerated the procedure well without complication and was taken to the recovery room in stable condition.  All sponge,  needle and instrument counts were correct.     The patient was noted to have a paraesophageal hernia.  The phrenoesophageal membrane was opened up with electrocautery and the right and left crura were cleaned off using sharp and blunt dissection.  The peritoneum overlying the right jyoti was incised and the plane along the hernia sac was developed.  The dissection then extended anteriorly and laterally to the left jyoti.  The base of the crural confluence was dissected free of adhesions to the hernia sac.  The hernia sac was carefully dissected into the mediastinum with caudal traction.  The interfaces between the pleura, pericardium, spine, and aorta were developed as a dissection was carried cephalically to the top of the hernia sac.  Sac contents were completely reduced back into the abdominal cavity.  Careful attention was made not to injure the vagus nerve.  The esophagus was identified and dissected circumferentially and along its previous stomach course in order to reduce tension, allowing the gastroesophageal junction to rest comfortably within the abdominal cavity.  The gastrosplenic ligament and the short gastric vessels were divided with the Enseal device.  The retroesophageal window was developed and the esophagus was retracted caudally.  The crural pillars were then approximated with 2-0 V Loc and continued along the left jyoti to pexy the esophagus up to the 12 o'clock position.  The repair was then checked with the bougie and was adequate and not too tight.

## 2024-04-24 NOTE — PLAN OF CARE
Goal Outcome Evaluation:              Outcome Evaluation: Lap Sleeve Gastrectomy w/hernia repair today, A&O, VSS, RA, up ad wilian, voiding intact, incisions c/d/i, dilaudid for pain, fiance at bedside, LR @ 150ml/hr, plans to d/c home tomorrow, sips of gatorade zero given, educated on bp monitoring, CTM

## 2024-04-24 NOTE — ANESTHESIA PROCEDURE NOTES
Airway  Urgency: elective    Date/Time: 4/24/2024 7:57 AM  Airway not difficult    General Information and Staff    Patient location during procedure: OR  CRNA/CAA: Kristen Carolina CRNA    Indications and Patient Condition  Indications for airway management: airway protection    Preoxygenated: yes  Mask difficulty assessment: 1 - vent by mask    Final Airway Details  Final airway type: endotracheal airway      Successful airway: ETT  Cuffed: yes   Successful intubation technique: direct laryngoscopy  Facilitating devices/methods: intubating stylet  Endotracheal tube insertion site: oral  Blade: Magda  Blade size: 3  ETT size (mm): 7.0  Cormack-Lehane Classification: grade I - full view of glottis  Placement verified by: chest auscultation and capnometry   Cuff volume (mL): 8  Measured from: lips  Number of attempts at approach: 1  Assessment: lips, teeth, and gum same as pre-op and atraumatic intubation

## 2024-04-24 NOTE — ANESTHESIA PREPROCEDURE EVALUATION
Anesthesia Evaluation     no history of anesthetic complications:   NPO Solid Status: > 8 hours  NPO Liquid Status: > 2 hours           Airway   Mallampati: II  no difficulty expected  Dental      Comment: Upper partial for several teeth    Pulmonary - normal exam   (+) ,sleep apnea  (-) COPD, asthma, not a smoker    PE comment: nonlabored  Cardiovascular - normal exam  Exercise tolerance: good (4-7 METS)    Rhythm: regular  Rate: normal    (+) hypertension, hyperlipidemia  (-) valvular problems/murmurs, past MI, CAD, dysrhythmias, angina      Neuro/Psych  (+) headaches, psychiatric history Anxiety, Depression and Bipolar  (-) seizures, TIA, CVA    ROS Comment: Balance disorder    GI/Hepatic/Renal/Endo    (+) morbid obesity, thyroid problem (Hashimoto's thyroiditis) hypothyroidism  (-) GERD, liver disease, no renal disease, diabetes    Musculoskeletal     (+) arthralgias, back pain  Abdominal    Substance History      OB/GYN      Comment: PCOS       Other   arthritis, blood dyscrasia anemia,     ROS/Med Hx Other: Hidradenitis                Anesthesia Plan    ASA 3     general   total IV anesthesia  intravenous induction     Anesthetic plan, risks, benefits, and alternatives have been provided, discussed and informed consent has been obtained with: patient.    CODE STATUS:

## 2024-04-24 NOTE — SIGNIFICANT NOTE
"   04/24/24 0652   Peripheral IV 04/24/24 0652 Anterior;Left   Placement date: If unknown, DO NOT use \"Add Comment\" note/Placement time: If unknown, DO NOT use \"Add Comment\" note: 04/24/24 0652   Hand Hygiene Completed: Yes  Size (Gauge): (c) 20 G  Orientation: Anterior;Left  Site Prep: Chlorhexidine  Technique: ...   Site Assessment Clean;Dry;Intact   Dressing Type Transparent   Line Status Blood return noted;Infusing;Flushed   Dressing Status Clean;Dry;Intact   Dressing Intervention New dressing     Ultrasound Inserted IV Site:lfa    Catheter Length:1.88in    Diameter:0.20cm    Depth:0.25cm      Vascular Access Score=4  1) Palpable / Visible / Dis  2) Palpable / Viasible / Not Distended  3) Easily Palpable / Not Visibile  4) Poorly Palpable / Visible  5) Poorly / Nonpalpable / NV   "

## 2024-04-24 NOTE — ANESTHESIA POSTPROCEDURE EVALUATION
Patient: Meghan Lui    Procedure Summary       Date: 04/24/24 Room / Location:  [facility]    Anesthesia Start: 0745 Anesthesia Stop: 0859    Procedure: Sleeve gastrectomy LAPAROSCOPIC WITH PARAESPHAGEAL HERNIA REPAIR (Abdomen) Diagnosis:       Obesity, Class III, BMI 40-49.9 (morbid obesity)      (Obesity, Class III, BMI 40-49.9 (morbid obesity) [E66.01])    Surgeons: Steve Ferraro Jr., MD Provider: Eric Ashley MD    Anesthesia Type: general ASA Status: 3            Anesthesia Type: general    Vitals  Vitals Value Taken Time   /81 04/24/24 0945   Temp 36.3 °C (97.4 °F) 04/24/24 0945   Pulse 59 04/24/24 0956   Resp 16 04/24/24 0945   SpO2 97 % 04/24/24 0956   Vitals shown include unfiled device data.        Post Anesthesia Care and Evaluation    Patient location during evaluation: bedside  Patient participation: complete - patient participated  Level of consciousness: awake and alert  Pain management: adequate    Airway patency: patent  Anesthetic complications: No anesthetic complications  PONV Status: controlled  Cardiovascular status: blood pressure returned to baseline and acceptable  Respiratory status: acceptable  Hydration status: acceptable

## 2024-04-25 VITALS
BODY MASS INDEX: 39.68 KG/M2 | SYSTOLIC BLOOD PRESSURE: 124 MMHG | WEIGHT: 293 LBS | HEART RATE: 54 BPM | HEIGHT: 72 IN | TEMPERATURE: 98 F | DIASTOLIC BLOOD PRESSURE: 78 MMHG | RESPIRATION RATE: 16 BRPM | OXYGEN SATURATION: 95 %

## 2024-04-25 LAB
ALBUMIN SERPL-MCNC: 3.9 G/DL (ref 3.5–5.2)
ALBUMIN/GLOB SERPL: 1.7 G/DL
ALP SERPL-CCNC: 51 U/L (ref 39–117)
ALT SERPL W P-5'-P-CCNC: 27 U/L (ref 1–33)
ANION GAP SERPL CALCULATED.3IONS-SCNC: 10 MMOL/L (ref 5–15)
AST SERPL-CCNC: 22 U/L (ref 1–32)
BASOPHILS # BLD AUTO: 0.05 10*3/MM3 (ref 0–0.2)
BASOPHILS NFR BLD AUTO: 0.4 % (ref 0–1.5)
BILIRUB SERPL-MCNC: 0.3 MG/DL (ref 0–1.2)
BUN SERPL-MCNC: 14 MG/DL (ref 6–20)
BUN/CREAT SERPL: 16.7 (ref 7–25)
CALCIUM SPEC-SCNC: 8.3 MG/DL (ref 8.6–10.5)
CHLORIDE SERPL-SCNC: 103 MMOL/L (ref 98–107)
CO2 SERPL-SCNC: 26 MMOL/L (ref 22–29)
CREAT SERPL-MCNC: 0.84 MG/DL (ref 0.57–1)
DEPRECATED RDW RBC AUTO: 43.8 FL (ref 37–54)
EGFRCR SERPLBLD CKD-EPI 2021: 94.2 ML/MIN/1.73
EOSINOPHIL # BLD AUTO: 0 10*3/MM3 (ref 0–0.4)
EOSINOPHIL NFR BLD AUTO: 0 % (ref 0.3–6.2)
ERYTHROCYTE [DISTWIDTH] IN BLOOD BY AUTOMATED COUNT: 12.8 % (ref 12.3–15.4)
GLOBULIN UR ELPH-MCNC: 2.3 GM/DL
GLUCOSE SERPL-MCNC: 95 MG/DL (ref 65–99)
HCT VFR BLD AUTO: 36 % (ref 34–46.6)
HGB BLD-MCNC: 12.2 G/DL (ref 12–15.9)
IMM GRANULOCYTES # BLD AUTO: 0.03 10*3/MM3 (ref 0–0.05)
IMM GRANULOCYTES NFR BLD AUTO: 0.2 % (ref 0–0.5)
LAB AP CASE REPORT: NORMAL
LYMPHOCYTES # BLD AUTO: 1.65 10*3/MM3 (ref 0.7–3.1)
LYMPHOCYTES NFR BLD AUTO: 13.6 % (ref 19.6–45.3)
MAGNESIUM SERPL-MCNC: 2 MG/DL (ref 1.6–2.6)
MCH RBC QN AUTO: 31.6 PG (ref 26.6–33)
MCHC RBC AUTO-ENTMCNC: 33.9 G/DL (ref 31.5–35.7)
MCV RBC AUTO: 93.3 FL (ref 79–97)
MONOCYTES # BLD AUTO: 0.9 10*3/MM3 (ref 0.1–0.9)
MONOCYTES NFR BLD AUTO: 7.4 % (ref 5–12)
NEUTROPHILS NFR BLD AUTO: 78.4 % (ref 42.7–76)
NEUTROPHILS NFR BLD AUTO: 9.5 10*3/MM3 (ref 1.7–7)
NRBC BLD AUTO-RTO: 0 /100 WBC (ref 0–0.2)
PATH REPORT.FINAL DX SPEC: NORMAL
PATH REPORT.GROSS SPEC: NORMAL
PHOSPHATE SERPL-MCNC: 2.1 MG/DL (ref 2.5–4.5)
PLATELET # BLD AUTO: 246 10*3/MM3 (ref 140–450)
PMV BLD AUTO: 9.9 FL (ref 6–12)
POTASSIUM SERPL-SCNC: 3.9 MMOL/L (ref 3.5–5.2)
PROT SERPL-MCNC: 6.2 G/DL (ref 6–8.5)
RBC # BLD AUTO: 3.86 10*6/MM3 (ref 3.77–5.28)
SODIUM SERPL-SCNC: 139 MMOL/L (ref 136–145)
WBC NRBC COR # BLD AUTO: 12.13 10*3/MM3 (ref 3.4–10.8)

## 2024-04-25 PROCEDURE — 84100 ASSAY OF PHOSPHORUS: CPT | Performed by: SURGERY

## 2024-04-25 PROCEDURE — 25010000002 METOCLOPRAMIDE PER 10 MG: Performed by: SURGERY

## 2024-04-25 PROCEDURE — 25810000003 SODIUM CHLORIDE 0.9 % SOLUTION 1,000 ML FLEX CONT: Performed by: SURGERY

## 2024-04-25 PROCEDURE — 85025 COMPLETE CBC W/AUTO DIFF WBC: CPT | Performed by: SURGERY

## 2024-04-25 PROCEDURE — 25010000002 CYANOCOBALAMIN PER 1000 MCG: Performed by: SURGERY

## 2024-04-25 PROCEDURE — 25010000002 ENOXAPARIN PER 10 MG: Performed by: SURGERY

## 2024-04-25 PROCEDURE — 80053 COMPREHEN METABOLIC PANEL: CPT | Performed by: SURGERY

## 2024-04-25 PROCEDURE — 83735 ASSAY OF MAGNESIUM: CPT | Performed by: SURGERY

## 2024-04-25 PROCEDURE — 25010000002 THIAMINE PER 100 MG: Performed by: SURGERY

## 2024-04-25 RX ORDER — ONDANSETRON 4 MG/1
4 TABLET, ORALLY DISINTEGRATING ORAL EVERY 4 HOURS PRN
Qty: 30 TABLET | Refills: 0 | Status: SHIPPED | OUTPATIENT
Start: 2024-04-25

## 2024-04-25 RX ADMIN — METOCLOPRAMIDE 10 MG: 5 INJECTION, SOLUTION INTRAMUSCULAR; INTRAVENOUS at 05:35

## 2024-04-25 RX ADMIN — ENOXAPARIN SODIUM 40 MG: 100 INJECTION SUBCUTANEOUS at 09:01

## 2024-04-25 RX ADMIN — ACETAMINOPHEN 1000 MG: 500 TABLET ORAL at 05:35

## 2024-04-25 RX ADMIN — ACETAMINOPHEN 1000 MG: 500 TABLET ORAL at 00:31

## 2024-04-25 RX ADMIN — METOCLOPRAMIDE 10 MG: 5 INJECTION, SOLUTION INTRAMUSCULAR; INTRAVENOUS at 00:31

## 2024-04-25 RX ADMIN — HYDROMORPHONE HYDROCHLORIDE 2 MG: 4 TABLET ORAL at 04:23

## 2024-04-25 RX ADMIN — LISINOPRIL 5 MG: 5 TABLET ORAL at 09:01

## 2024-04-25 RX ADMIN — LEVOTHYROXINE SODIUM 137 MCG: 137 TABLET ORAL at 05:34

## 2024-04-25 RX ADMIN — FAMOTIDINE 20 MG: 10 INJECTION INTRAVENOUS at 09:01

## 2024-04-25 RX ADMIN — THIAMINE HYDROCHLORIDE 250 ML/HR: 100 INJECTION, SOLUTION INTRAMUSCULAR; INTRAVENOUS at 00:31

## 2024-04-25 RX ADMIN — CYANOCOBALAMIN 1000 MCG: 1000 INJECTION, SOLUTION INTRAMUSCULAR; SUBCUTANEOUS at 09:01

## 2024-04-25 RX ADMIN — HYDROMORPHONE HYDROCHLORIDE 2 MG: 4 TABLET ORAL at 00:31

## 2024-04-25 RX ADMIN — HYOSCYAMINE SULFATE 125 MCG: 0.12 TABLET ORAL; SUBLINGUAL at 05:34

## 2024-04-25 RX ADMIN — DIBASIC SODIUM PHOSPHATE, MONOBASIC POTASSIUM PHOSPHATE AND MONOBASIC SODIUM PHOSPHATE 2 TABLET: 852; 155; 130 TABLET ORAL at 10:31

## 2024-04-25 RX ADMIN — HYDROMORPHONE HYDROCHLORIDE 2 MG: 4 TABLET ORAL at 09:01

## 2024-04-25 NOTE — PLAN OF CARE
Goal Outcome Evaluation:      A/ox4, up ad wilian, gas pain throughout night, ambulated around unit, 4 lap sites bruising around some of them.       Problem: Adult Inpatient Plan of Care  Goal: Absence of Hospital-Acquired Illness or Injury  Intervention: Identify and Manage Fall Risk  Recent Flowsheet Documentation  Taken 4/25/2024 0423 by Manisha Isidro RN  Safety Promotion/Fall Prevention:   assistive device/personal items within reach   safety round/check completed  Taken 4/25/2024 0217 by Manisha Isidro RN  Safety Promotion/Fall Prevention:   assistive device/personal items within reach   safety round/check completed  Taken 4/25/2024 0031 by Manisha Isidro RN  Safety Promotion/Fall Prevention:   assistive device/personal items within reach   safety round/check completed  Taken 4/24/2024 2212 by Manisha Isidro RN  Safety Promotion/Fall Prevention:   assistive device/personal items within reach   safety round/check completed  Taken 4/24/2024 2031 by Manisha Isidro RN  Safety Promotion/Fall Prevention:   activity supervised   assistive device/personal items within reach   clutter free environment maintained   fall prevention program maintained   gait belt   lighting adjusted   mobility aid in reach   nonskid shoes/slippers when out of bed   safety round/check completed  Taken 4/24/2024 2029 by Manisha Isidro RN  Safety Promotion/Fall Prevention:   assistive device/personal items within reach   activity supervised   clutter free environment maintained   fall prevention program maintained   gait belt   lighting adjusted   mobility aid in reach   nonskid shoes/slippers when out of bed   safety round/check completed  Intervention: Prevent Skin Injury  Recent Flowsheet Documentation  Taken 4/25/2024 0423 by Manisha Isidro RN  Body Position: position changed independently  Taken 4/25/2024 0217 by Manisha Isidro RN  Body Position: position changed independently  Taken 4/25/2024 0031 by Manisha Isidro RN  Body Position:   position changed  independently   neutral body alignment   sitting up in bed  Taken 4/24/2024 2212 by Manisha Isidro RN  Body Position: sitting up in bed  Taken 4/24/2024 2031 by Manisha Isidro RN  Body Position:   position changed independently   neutral body alignment  Skin Protection:   adhesive use limited   incontinence pads utilized   protective footwear used  Taken 4/24/2024 2029 by Manisha Isidro RN  Body Position:   position changed independently   neutral body alignment  Intervention: Prevent and Manage VTE (Venous Thromboembolism) Risk  Recent Flowsheet Documentation  Taken 4/25/2024 0423 by Manisha Isidro RN  Activity Management:   ambulated outside room   activity encouraged  Taken 4/24/2024 2031 by Manisha Isidro RN  Activity Management:   ambulated outside room   dorsiflexion/plantar flexion performed   sitting, edge of bed   activity encouraged  VTE Prevention/Management:   bilateral   sequential compression devices off  Range of Motion: active ROM (range of motion) encouraged  Taken 4/24/2024 2029 by Manisha Isidro RN  Activity Management:   ambulated outside room   dorsiflexion/plantar flexion performed   activity encouraged   ambulated in room   back to bed  Goal: Optimal Comfort and Wellbeing  Intervention: Monitor Pain and Promote Comfort  Recent Flowsheet Documentation  Taken 4/25/2024 0423 by Manisha Isidro RN  Pain Management Interventions: see MAR  Taken 4/25/2024 0031 by Manisha Isidro RN  Pain Management Interventions: see MAR  Taken 4/24/2024 2031 by Manisha Isidro RN  Pain Management Interventions: see MAR  Intervention: Provide Person-Centered Care  Recent Flowsheet Documentation  Taken 4/24/2024 2031 by Manisha Isidro RN  Trust Relationship/Rapport:   care explained   thoughts/feelings acknowledged     Problem: Hypertension Comorbidity  Goal: Blood Pressure in Desired Range  Intervention: Maintain Blood Pressure Management  Recent Flowsheet Documentation  Taken 4/25/2024 0423 by Manisha Isidro RN  Medication  Review/Management: medications reviewed  Taken 4/25/2024 0217 by Manisha Isidro RN  Medication Review/Management: medications reviewed  Taken 4/25/2024 0031 by Manisha Isidro RN  Medication Review/Management: medications reviewed  Taken 4/24/2024 2212 by Manisha Isidro, RN  Medication Review/Management: medications reviewed  Taken 4/24/2024 2031 by Manisha Isidro RN  Syncope Management:   position changed slowly   legs elevated  Medication Review/Management: medications reviewed  Taken 4/24/2024 2029 by Manisha Isidro, RN  Medication Review/Management: medications reviewed

## 2024-04-25 NOTE — PROGRESS NOTES
Continued Stay Note  [facility]     Patient Name: Meghan Lui  MRN: [id number]  Today's Date: 4/25/2024    Admit Date: 4/24/2024        Discharge Plan       Row Name 04/25/24 1656       Plan    Final Discharge Disposition Code 01 - home or self-care    Final Note D/c home                   Discharge Codes    No documentation.                 Expected Discharge Date and Time       Expected Discharge Date Expected Discharge Time    Apr 25, 2024               Radhika Webb RN

## 2024-04-25 NOTE — DISCHARGE SUMMARY
"Discharge Summary    Patient name: Meghan Lui    Medical record number: [id number]    Admission date: 4/24/2024  Discharge date:      Attending physician: Dr. Steve Ferraro    Primary care physician: Dionna Valenzuela APRN    Referring physician: Steve Ferraro Jr., MD  [address] [place] [id number]    Condition on discharge: Stable    Primary Diagnoses:  Morbid obesity with co-morbidities    Operative Procedure:  Laparoscopic gastric sleeve w/ PHR     Meghan Lui  is post op day one status post procedure listed. Patient denies shortness of air and lower extremity pain. Feels better than yesterday. No vomiting this am. Ambulating well and using incentive spirometer.          /78 (BP Location: Right arm, Patient Position: Lying)   Pulse 54   Temp 98 °F (36.7 °C) (Oral)   Resp 16   Ht 185.4 cm (73\")   Wt (!) 140 kg (309 lb)   SpO2 95%   BMI 40.77 kg/m²     General:  alert, appears stated age, cooperative, and no distress   Abdomen: soft, bowel sounds active, appropriate tenderness   Incision:   healing well, no drainage, no erythema, no hernia, no seroma, no swelling, no dehiscence, incision well approximated   Heart: Regular rate   Lungs: Clear to auscultation bilaterally     I reviewed the patient's new clinical results.     Lab Results (last 24 hours)       Procedure Component Value Units Date/Time    Comprehensive Metabolic Panel [[id number]]  (Abnormal) Collected: 04/25/24 0557    Specimen: Blood Updated: 04/25/24 0635     Glucose 95 mg/dL      BUN 14 mg/dL      Creatinine 0.84 mg/dL      Sodium 139 mmol/L      Potassium 3.9 mmol/L      Chloride 103 mmol/L      CO2 26.0 mmol/L      Calcium 8.3 mg/dL      Total Protein 6.2 g/dL      Albumin 3.9 g/dL      ALT (SGPT) 27 U/L      AST (SGOT) 22 U/L      Alkaline Phosphatase 51 U/L      Total Bilirubin 0.3 mg/dL      Globulin 2.3 gm/dL      A/G Ratio 1.7 g/dL      BUN/Creatinine Ratio 16.7     Anion Gap 10.0 mmol/L  "     eGFR 94.2 mL/min/1.73     Narrative:      GFR Normal >60  Chronic Kidney Disease <60  Kidney Failure <15      Phosphorus [[id number]]  (Abnormal) Collected: 04/25/24 0557    Specimen: Blood Updated: 04/25/24 0635     Phosphorus 2.1 mg/dL     Magnesium [[id number]]  (Normal) Collected: 04/25/24 0557    Specimen: Blood Updated: 04/25/24 0635     Magnesium 2.0 mg/dL     CBC & Differential [[id number]]  (Abnormal) Collected: 04/25/24 0557    Specimen: Blood Updated: 04/25/24 0617    Narrative:      The following orders were created for panel order CBC & Differential.  Procedure                               Abnormality         Status                     ---------                               -----------         ------                     CBC Auto Differential[[id number]]        Abnormal            Final result                 Please view results for these tests on the individual orders.    CBC Auto Differential [[id number]]  (Abnormal) Collected: 04/25/24 0557    Specimen: Blood Updated: 04/25/24 0617     WBC 12.13 10*3/mm3      RBC 3.86 10*6/mm3      Hemoglobin 12.2 g/dL      Hematocrit 36.0 %      MCV 93.3 fL      MCH 31.6 pg      MCHC 33.9 g/dL      RDW 12.8 %      RDW-SD 43.8 fl      MPV 9.9 fL      Platelets 246 10*3/mm3      Neutrophil % 78.4 %      Lymphocyte % 13.6 %      Monocyte % 7.4 %      Eosinophil % 0.0 %      Basophil % 0.4 %      Immature Grans % 0.2 %      Neutrophils, Absolute 9.50 10*3/mm3      Lymphocytes, Absolute 1.65 10*3/mm3      Monocytes, Absolute 0.90 10*3/mm3      Eosinophils, Absolute 0.00 10*3/mm3      Basophils, Absolute 0.05 10*3/mm3      Immature Grans, Absolute 0.03 10*3/mm3      nRBC 0.0 /100 WBC     Tissue Pathology Exam [[id number]] Collected: 04/24/24 0835    Specimen: Tissue from Stomach Updated: 04/24/24 0909               Assessment:      Doing well postoperatively.      Plan:   1. Continue Stage 1 diet  2. Continue with ambulation and Incentive spirometry  3. Plan for discharge  home    Patient was seen and examined by Dr. Ferraro.    Hospital Course: The patient is a very pleasant 33 y.o. female that was admitted to the hospital with morbid obesity with co-morbidities- HTN, PCOS. Patient underwent laparoscopic sleeve gastrectomy with paraesophageal hernia repair (see OP note) without complication. The patient was then admitted to the bariatric unit per protocol where they remained stable. POD #1 she was started on a stage 1 bariatric diet which she tolerated so she was able to be discharged home in good condition.      Discharge medications:      Discharge Medications        New Medications        Instructions Start Date   ondansetron ODT 4 MG disintegrating tablet  Commonly known as: ZOFRAN-ODT   4 mg, Translingual, Every 4 Hours PRN             Changes to Medications        Instructions Start Date   Synthroid 137 MCG tablet  Generic drug: levothyroxine  What changed: when to take this   137 mcg, Oral, Daily             Continue These Medications        Instructions Start Date   baclofen 10 MG tablet  Commonly known as: LIORESAL   Take 1 tablet 3 times a day by oral route as needed for 30 days.      cloNIDine 0.1 MG tablet  Commonly known as: CATAPRES   Take 1 tablet by mouth Every Night.      dicyclomine 10 MG capsule  Commonly known as: BENTYL   10 mg, Oral, 4 Times Daily Before Meals & Nightly      Doxepin HCl 3 MG tablet   Take 3 mg by mouth Every Night.      Enoxaparin Sodium 40 MG/0.4ML solution prefilled syringe syringe  Commonly known as: LOVENOX   40 mg, Subcutaneous, Every 12 Hours Scheduled, Start after surgery unless instructed otherwise      ferrous sulfate 325 (65 Fe) MG tablet   TAKE ONE TABLET BY MOUTH EVERY MORNING WITH FOOD      folic acid 1 MG tablet  Commonly known as: FOLVITE   1 mg, Oral, Daily      gabapentin 800 MG tablet  Commonly known as: NEURONTIN   800 mg, Oral, 2 Times Daily      hydroCHLOROthiazide 12.5 MG tablet   TAKE ONE TABLET BY MOUTH ONCE DAILY       HYDROcodone-acetaminophen 7.5-325 MG per tablet  Commonly known as: NORCO   1 tablet, Oral, Every Morning      hydrOXYzine pamoate 50 MG capsule  Commonly known as: VISTARIL   50 mg, Oral, 3 Times Daily PRN      lamoTRIgine 200 MG tablet  Commonly known as: LaMICtal   200 mg, Oral, 2 Times Daily      lidocaine 2 % jelly  Commonly known as: XYLOCAINE   Topical, As Needed      lisinopril 5 MG tablet  Commonly known as: PRINIVIL,ZESTRIL   5 mg, Oral, Daily      metFORMIN  MG 24 hr tablet  Commonly known as: GLUCOPHAGE-XR   500 mg, Oral, Daily With Breakfast      mupirocin 2 % cream  Commonly known as: BACTROBAN   1 Application, Topical, 3 Times Daily      spironolactone 25 MG tablet  Commonly known as: ALDACTONE   25 mg, Oral, Daily             Stop These Medications      Latoya-Hex 4 4 % solution  Generic drug: Chlorhexidine Gluconate              Discharge instructions:  Per Bariatric manual; per our protocol      Follow-up appointment: Follow up with Dr. Ferraro in the office as scheduled.  If not already scheduled call for appointment at [phone].

## 2024-04-25 NOTE — PAYOR COMM NOTE
"Meghan Patel May (33 y.o. Female)                                 PASSPORT FAXED [place] SUMMARY CASE REF [id number]                                 REPLY TO [facility] DEPT  [phone]           Date of Birth   1990    Social Security Number       [place]   [address] [facility] [place] [address]    Home Phone   [phone]    MRN   [id number]       [employer]   Non-Rastafari    Marital Status   Single                            Admission Date   4/24/24    Admission Type   Elective    Admitting Provider   Steve Ferraro Jr., MD    Attending Provider       Department, Room/Bed   [address], P791/1       Discharge Date   4/24/2024    Discharge Disposition   Home or Self Care    Discharge Destination                                 Attending Provider: (none)   Allergies: Remeron [Mirtazapine], Mushroom, Buspirone    Isolation: None   Infection: None   Code Status: Prior    Ht: 185.4 cm (73\")   Wt: 140 kg (309 lb)    Admission Cmt: None   Principal Problem: Obesity, Class III, BMI 40-49.9 (morbid obesity) [E66.01]                   Active Insurance as of 4/24/2024       Primary Coverage       Payor Plan Insurance Group Employer/Plan Group    [facility] BY TERRY [facility] BY TERRY [id number]       Payor Plan Address Payor Plan Phone Number Payor Plan Fax Number Effective Dates    [address] [address]   1/1/2021 - None Entered    [facility] [id number]         Subscriber Name Subscriber Birth Date Member ID       MEGHAN PATEL MAY 1990 [id number]                     Emergency Contacts        (Rel.) Home Phone Work Phone Mobile Phone    DONALD VASQUEZ (Significant Other) [phone] -- [phone]                 History & Physical        Steve Ferraro Jr., MD at 04/24/24 0649            H&P reviewed. The patient was examined and there are no changes to the H&P.          Electronically signed by Steve Ferraro Jr., MD at 04/24/24 0649   " Source Note: H&P (View-Only)          Bariatric Consult:  Referred by Dionna Valenzuela APRN    Meghan Lui is here today for consult on Consult (Sleeve pre-op consult )      History of Present Illness:     Meghan Lui is a 33 y.o. female with morbid obesity with co-morbidities including hypertension, back pain, and knee pain who presents for surgical consultation for the above procedure. Meghan has completed the initial intake visit and has been examined by our nurse practitioner, dietician, psychologist and underwent the extensive educational teaching process under the guidance of our bariatric coordinator and myself. Meghan also has seen or if has not yet will see the educational video VISH on the surgical procedure if available. Meghan attended today more educational teaching from our bariatric coordinator and myself. Meghan has had an extensive medical workup including a visit with their primary care physician, EKG, chest radiograph, blood work, EGD or UGI and possibly further testing. These have been reviewed by me and discussed with the patient. Meghan is now ready to proceed with surgery. Meghan presently denies nausea, vomiting, fever, chills, chest pain, shortness of air, melena, hematochezia, hemetemesis, dysuria, frequency, hematuria.     Past Medical History:   Diagnosis Date    Anemia april 2023    Anxiety     Arthritis     Asthma     Bladder problem     Cholelithiasis     Removed October 2011    Condition not found     HERNIATED DISC (UNSPECIFIED)     Cyst of skin     Depression     Foot pain, left 12/04/2019    Heel pain     HL (hearing loss)     Hx of blood diseases     Hx of degenerative disc disease     Hyperlipidemia     Hypertension 2015    Hypothyroidism     Ingrown toenail     Irritable bowel syndrome     Leg pain     Leg swelling     Lumbago     Migraines     Mood disorder     Muscle cramps     Neuromuscular disorder 2018    Numbness in feet     Obesity      Thyroid disease     Visual impairment        Encounter Diagnoses   Name Primary?    Obesity, Class III, BMI 40-49.9 (morbid obesity) Yes    Primary hypertension     Multiple joint pain     Depression with anxiety     Irritable bowel syndrome with both constipation and diarrhea     PCOS (polycystic ovarian syndrome)     Other specified hypothyroidism        Past Surgical History:   Procedure Laterality Date     SECTION      , , 2017    CHOLECYSTECTOMY      DILATION AND CURETTAGE, DIAGNOSTIC / THERAPEUTIC      WISDOM TOOTH EXTRACTION           * No active hospital problems. *      Allergies   Allergen Reactions    Remeron [Mirtazapine] Other (See Comments)     Suicidal ideation    Mushroom GI Intolerance    Buspirone Mental Status Change     Suicidal ideation         Current Outpatient Medications:     baclofen (LIORESAL) 10 MG tablet, Take 1 tablet 3 times a day by oral route as needed for 30 days., Disp: , Rfl:     Chlorhexidine Gluconate (Latoya-Hex 4) 4 % solution, Apply 1 application  topically to the appropriate area as directed Daily., Disp: 473 mL, Rfl: 12    cloNIDine (CATAPRES) 0.1 MG tablet, , Disp: , Rfl:     dicyclomine (BENTYL) 10 MG capsule, TAKE ONE CAPSULE BY MOUTH FOUR TIMES A DAY BEFORE MEALS AND AT BEDTIME, Disp: 120 capsule, Rfl: 2    Doxepin HCl 3 MG tablet, , Disp: , Rfl:     Enoxaparin Sodium (LOVENOX) 40 MG/0.4ML solution prefilled syringe syringe, Inject 0.4 mL under the skin into the appropriate area as directed Every 12 (Twelve) Hours for 14 days. Start after surgery unless instructed otherwise, Disp: 11.2 mL, Rfl: 0    ferrous sulfate 325 (65 FE) MG EC tablet, TAKE ONE TABLET BY MOUTH ONCE DAILY WITH BREAKFAST, Disp: 90 tablet, Rfl: 1    folic acid (FOLVITE) 1 MG tablet, Take 1 tablet by mouth Daily., Disp: 60 tablet, Rfl: 2    gabapentin (NEURONTIN) 800 MG tablet, 1 tablet 2 (Two) Times a Day., Disp: , Rfl:     hydroCHLOROthiazide (HYDRODIURIL) 12.5 MG tablet,  TAKE ONE TABLET BY MOUTH ONCE DAILY, Disp: 90 tablet, Rfl: 3    HYDROcodone-acetaminophen (NORCO) 7.5-325 MG per tablet, Take 2 tablets by mouth Every 4 (Four) Hours As Needed., Disp: , Rfl:     hydrOXYzine pamoate (VISTARIL) 50 MG capsule, Take 1 capsule by mouth 3 (Three) Times a Day As Needed., Disp: , Rfl:     lamoTRIgine (LaMICtal) 200 MG tablet, Take 1 tablet by mouth 2 (Two) Times a Day., Disp: 90 tablet, Rfl: 1    lidocaine (XYLOCAINE) 2 % jelly, Apply  topically to the appropriate area as directed As Needed for Mild Pain., Disp: 28 g, Rfl: 0    lisinopril (PRINIVIL,ZESTRIL) 5 MG tablet, TAKE ONE TABLET BY MOUTH ONCE DAILY, Disp: 90 tablet, Rfl: 1    metFORMIN ER (GLUCOPHAGE-XR) 500 MG 24 hr tablet, TAKE ONE TABLET BY MOUTH ONCE DAILY WITH BREAKFAST, Disp: 90 tablet, Rfl: 1    mupirocin (BACTROBAN) 2 % cream, Apply 1 Application topically to the appropriate area as directed 3 (Three) Times a Day., Disp: 30 g, Rfl: 0    spironolactone (ALDACTONE) 25 MG tablet, TAKE ONE TABLET BY MOUTH ONCE DAILY, Disp: 90 tablet, Rfl: 1    Synthroid 137 MCG tablet, TAKE ONE TABLET BY MOUTH EVERY DAY, Disp: 90 tablet, Rfl: 1    Social History     Socioeconomic History    Marital status: Single   Tobacco Use    Smoking status: Every Day     Current packs/day: 0.00     Average packs/day: 0.5 packs/day for 16.0 years (8.0 ttl pk-yrs)     Types: Cigarettes     Start date: 2005     Last attempt to quit: 2021     Years since quittin.8    Smokeless tobacco: Never    Tobacco comments:     VAPES EVERYDAY    Vaping Use    Vaping status: Every Day    Start date: 7/15/2021    Substances: Nicotine, Flavoring    Devices: Disposable   Substance and Sexual Activity    Alcohol use: Not Currently     Comment: CURRENT SOME DAY (rare)    Drug use: Never    Sexual activity: Yes     Partners: Male     Birth control/protection: None       Family History   Problem Relation Age of Onset    Diabetes Mother     Hypertension Mother      Vision loss Mother     Thyroid disease Mother     Obesity Mother     Cancer Father         Passed 2023    Early death Father         cancer    Thyroid disease Father     Arthritis Sister     Obesity Maternal Aunt     Obesity Maternal Uncle     Arthritis Paternal Grandmother     Cancer Paternal Grandmother     Cancer Paternal Grandfather     COPD Paternal Grandfather        Review of Systems:  Review of Systems   Constitutional:  Positive for fatigue.   Gastrointestinal:  Positive for constipation and diarrhea.   Musculoskeletal:  Positive for arthralgias.   All other systems reviewed and are negative.      Physical Exam:  Vital Signs:  Weight: (!) 140 kg (309 lb)   Body mass index is 42.31 kg/m².  Temp: 97.8 °F (36.6 °C)   Heart Rate: 61   BP: 136/74     Physical Exam  Vitals reviewed.   HENT:      Head: Normocephalic and atraumatic.      Mouth/Throat:      Mouth: Mucous membranes are moist.      Pharynx: Oropharynx is clear.   Eyes:      General: No scleral icterus.     Extraocular Movements: Extraocular movements intact.      Conjunctiva/sclera: Conjunctivae normal.      Pupils: Pupils are equal, round, and reactive to light.   Neck:      Thyroid: No thyromegaly.   Cardiovascular:      Rate and Rhythm: Normal rate.   Pulmonary:      Effort: Pulmonary effort is normal. No respiratory distress.      Breath sounds: Normal breath sounds. No stridor. No wheezing or rhonchi.   Abdominal:      General: Bowel sounds are normal.      Palpations: Abdomen is soft.      Tenderness: There is no abdominal tenderness. There is no right CVA tenderness, left CVA tenderness, guarding or rebound.      Hernia: No hernia is present.   Musculoskeletal:         General: Normal range of motion.      Cervical back: Normal range of motion and neck supple.   Lymphadenopathy:      Cervical: No cervical adenopathy.   Skin:     General: Skin is warm and dry.      Findings: No erythema.   Neurological:      Mental Status: She is alert and  oriented to person, place, and time.   Psychiatric:         Mood and Affect: Mood normal.         Behavior: Behavior normal.         Thought Content: Thought content normal.         Judgment: Judgment normal.         Assessment:    Meghan Lui is a 33 y.o. year old female with medically complicated severe obesity with a BMI of Body mass index is 42.31 kg/m². and multiple co-morbidities listed in the encounter diagnosis.    I think she is an appropriate candidate for this surgery, and is ready to proceed.    Plan/Discussion/Summary:  No hiatal hernia per upper GI.  No PPI.  H. pylori negative    The patient has returned to the office for a surgical consultation and has requested to proceed with gastric sleeve.  I have had the opportunity to obtain a history, examine the patient and review the patient's chart. The procedure could be done laparoscopically and or robotically.    The patient understands that surgery is a tool and that weight loss is not guaranteed but only seen in the context of appropriate use, regular follow up, exercise and making appropriate food choices.     I personally discussed the potential complications of the laparoscopic gastric sleeve with this patient.  The patient is well aware of potential complications of the surgery that include but not limited to bleeding, infections, deep vein thrombosis, pulmonary embolism, pulmonary complications such as pneumonia, cardiac event, hernias, small bowel obstruction, damage to the spleen or other organs, bowel injury, disfiguring scars, failure to lose weight, need for additional surgery, conversion to an open procedure and death.  The patient is also aware of complications which apply in particular to the gastric sleeve and can include but not limited to the leakage of gastric contents at the staple line, the development of an intra-abdominal abscess, gastroesophageal reflux disease, Lane's esophagus, ulcers, vitamin/mineral deficiencies,  strictures, and the possibility of converting this procedure to a Rhonda-en-Y gastric bypass. The patient also understands the possibility of requiring an acid reducer medication for the rest of their life.    The risks, benefits, potential complications and alternative therapies were discussed at great length as outlined in our extensive consent forms, online consent and educational teaching processes.    The patient has confirmed the participation in the programs extensive educational activities.    All questions and concerns were answered to patient's satisfaction.  The patient now wishes to proceed with surgery.     The patient has agreed to a postoperative course of anitcoagulant therapy.      I instructed patient that the surgery could be laparoscopically and/or robotically.    I instructed patient to start on a H2 blocker or proton pump inhibitor if not already on one of these medications.    I explained in detail the procedures that are in the consent.  All of these procedures have a chance to convert to open if any technical challenges or complications do occur.  Bariatric surgery is not cosmetic surgery but rather a tool to help a patient make a life-long commitment lifestyle change including diet, exercise, behavior changes, and taking supplemental vitamins and minerals.    Problems after surgery may require more operations to correct them.    The risks, benefits, alternatives, and potential complications of all of the procedures were explained in detail including, but not limited to death, anesthesia and medication adverse effect, deep venous thrombosis, pulmonary embolism, trocar site/incisional hernia, wound infection, abdominal infection, bleeding, failure to lose weight, gain weight, a change in body image, metabolic complications with vitamin deficiences and anemia.    Weight loss expectations were discussed with the patient in detail. The weight loss operations most commonly performed are the sleeve  gastrectomy and the Rhonda-en-Y gastric bypass. These operations result in weight losses up to approximately 25-35% of initial body weight 12 to 24 months after surgery with the gastric bypass usually the higher percent of weight loss but depends on patient using the tool.    For the gastric bypass and loop duodenal switch (JANNETH-S) the risks include but not limited to the following early complications:  Anastomotic leak/peritonitis, Rhonda/Alimentary/biliopancreatic limb obstruction, severe & minor wound infection/seroma, and nausea/vomiting.  Late complications can include but are not limited to malnutrition, vitamin deficiencies, frequent loose stools,  stomal stenosis, marginal ulcer, bowel obstruction, intussusception, internal, and incisional hernia.    Regarding the gastric sleeve, there is higher risk of dysphagia and reflux leading to possible Lane's esophagus compared to a gastric bypass, as well as risk of internal visceral/organ injury, splenectomy, bleeding, infection, leak (which could require further intervention possible conversion to Rhonda-en-Y gastric bypass), stenosis and possibility of regaining weight.    Meghan was counseled regarding diagnostic results, instructions for management, risk factor reductions, prognosis, patient and family education, impressions, risks and benefits of treatment options and importance of compliance with treatment. Total time of the encounter was over 45 minutes counseling the patient regarding the procedure as above and reviewing as well as ordering labs, medications and the procedure.  The chart was also reviewed prior to seeing the patient reviewing previous testing, studies and labs.    Meghan understands the surgical procedures and the different surgical options that are available.  She understands the lifestyle changes that are required after surgery and has agreed to follow the guidelines outlined in the weight management program.  She also expressed  understanding of the risks involved and had all of female questions answered and desires to proceed.      Steve Ferraro MD  4/18/2024    Electronically signed by Steve Ferraro Jr., MD at 04/18/24 0728                 Steve Ferraro Jr., MD at 04/18/24 0715          Bariatric Consult:  Referred by Dionna Valenzuela APRN    Meghan Lui is here today for consult on Consult (Sleeve pre-op consult )      History of Present Illness:     Meghan Lui is a 33 y.o. female with morbid obesity with co-morbidities including hypertension, back pain, and knee pain who presents for surgical consultation for the above procedure. Meghan has completed the initial intake visit and has been examined by our nurse practitioner, dietician, psychologist and underwent the extensive educational teaching process under the guidance of our bariatric coordinator and myself. Meghan also has seen or if has not yet will see the educational video VISH on the surgical procedure if available. Meghan attended today more educational teaching from our bariatric coordinator and myself. Meghan has had an extensive medical workup including a visit with their primary care physician, EKG, chest radiograph, blood work, EGD or UGI and possibly further testing. These have been reviewed by me and discussed with the patient. Meghan is now ready to proceed with surgery. Meghan presently denies nausea, vomiting, fever, chills, chest pain, shortness of air, melena, hematochezia, hemetemesis, dysuria, frequency, hematuria.     Past Medical History:   Diagnosis Date    Anemia april 2023    Anxiety     Arthritis     Asthma     Bladder problem     Cholelithiasis     Removed October 2011    Condition not found     HERNIATED DISC (UNSPECIFIED)     Cyst of skin     Depression     Foot pain, left 12/04/2019    Heel pain     HL (hearing loss)     Hx of blood diseases     Hx of degenerative disc disease     Hyperlipidemia      Hypertension 2015    Hypothyroidism     Ingrown toenail     Irritable bowel syndrome     Leg pain     Leg swelling     Lumbago     Migraines     Mood disorder     Muscle cramps     Neuromuscular disorder 2018    Numbness in feet     Obesity     Thyroid disease     Visual impairment        Encounter Diagnoses   Name Primary?    Obesity, Class III, BMI 40-49.9 (morbid obesity) Yes    Primary hypertension     Multiple joint pain     Depression with anxiety     Irritable bowel syndrome with both constipation and diarrhea     PCOS (polycystic ovarian syndrome)     Other specified hypothyroidism        Past Surgical History:   Procedure Laterality Date     SECTION      , ,     CHOLECYSTECTOMY      DILATION AND CURETTAGE, DIAGNOSTIC / THERAPEUTIC      WISDOM TOOTH EXTRACTION           * No active hospital problems. *      Allergies   Allergen Reactions    Remeron [Mirtazapine] Other (See Comments)     Suicidal ideation    Mushroom GI Intolerance    Buspirone Mental Status Change     Suicidal ideation         Current Outpatient Medications:     baclofen (LIORESAL) 10 MG tablet, Take 1 tablet 3 times a day by oral route as needed for 30 days., Disp: , Rfl:     Chlorhexidine Gluconate (Latoya-Hex 4) 4 % solution, Apply 1 application  topically to the appropriate area as directed Daily., Disp: 473 mL, Rfl: 12    cloNIDine (CATAPRES) 0.1 MG tablet, , Disp: , Rfl:     dicyclomine (BENTYL) 10 MG capsule, TAKE ONE CAPSULE BY MOUTH FOUR TIMES A DAY BEFORE MEALS AND AT BEDTIME, Disp: 120 capsule, Rfl: 2    Doxepin HCl 3 MG tablet, , Disp: , Rfl:     Enoxaparin Sodium (LOVENOX) 40 MG/0.4ML solution prefilled syringe syringe, Inject 0.4 mL under the skin into the appropriate area as directed Every 12 (Twelve) Hours for 14 days. Start after surgery unless instructed otherwise, Disp: 11.2 mL, Rfl: 0    ferrous sulfate 325 (65 FE) MG EC tablet, TAKE ONE TABLET BY MOUTH ONCE DAILY WITH BREAKFAST, Disp: 90  tablet, Rfl: 1    folic acid (FOLVITE) 1 MG tablet, Take 1 tablet by mouth Daily., Disp: 60 tablet, Rfl: 2    gabapentin (NEURONTIN) 800 MG tablet, 1 tablet 2 (Two) Times a Day., Disp: , Rfl:     hydroCHLOROthiazide (HYDRODIURIL) 12.5 MG tablet, TAKE ONE TABLET BY MOUTH ONCE DAILY, Disp: 90 tablet, Rfl: 3    HYDROcodone-acetaminophen (NORCO) 7.5-325 MG per tablet, Take 2 tablets by mouth Every 4 (Four) Hours As Needed., Disp: , Rfl:     hydrOXYzine pamoate (VISTARIL) 50 MG capsule, Take 1 capsule by mouth 3 (Three) Times a Day As Needed., Disp: , Rfl:     lamoTRIgine (LaMICtal) 200 MG tablet, Take 1 tablet by mouth 2 (Two) Times a Day., Disp: 90 tablet, Rfl: 1    lidocaine (XYLOCAINE) 2 % jelly, Apply  topically to the appropriate area as directed As Needed for Mild Pain., Disp: 28 g, Rfl: 0    lisinopril (PRINIVIL,ZESTRIL) 5 MG tablet, TAKE ONE TABLET BY MOUTH ONCE DAILY, Disp: 90 tablet, Rfl: 1    metFORMIN ER (GLUCOPHAGE-XR) 500 MG 24 hr tablet, TAKE ONE TABLET BY MOUTH ONCE DAILY WITH BREAKFAST, Disp: 90 tablet, Rfl: 1    mupirocin (BACTROBAN) 2 % cream, Apply 1 Application topically to the appropriate area as directed 3 (Three) Times a Day., Disp: 30 g, Rfl: 0    spironolactone (ALDACTONE) 25 MG tablet, TAKE ONE TABLET BY MOUTH ONCE DAILY, Disp: 90 tablet, Rfl: 1    Synthroid 137 MCG tablet, TAKE ONE TABLET BY MOUTH EVERY DAY, Disp: 90 tablet, Rfl: 1    Social History     Socioeconomic History    Marital status: Single   Tobacco Use    Smoking status: Every Day     Current packs/day: 0.00     Average packs/day: 0.5 packs/day for 16.0 years (8.0 ttl pk-yrs)     Types: Cigarettes     Start date: 2005     Last attempt to quit: 2021     Years since quittin.8    Smokeless tobacco: Never    Tobacco comments:     VAPES EVERYDAY    Vaping Use    Vaping status: Every Day    Start date: 7/15/2021    Substances: Nicotine, Flavoring    Devices: Disposable   Substance and Sexual Activity    Alcohol use: Not  Currently     Comment: CURRENT SOME DAY (rare)    Drug use: Never    Sexual activity: Yes     Partners: Male     Birth control/protection: None       Family History   Problem Relation Age of Onset    Diabetes Mother     Hypertension Mother     Vision loss Mother     Thyroid disease Mother     Obesity Mother     Cancer Father         Passed 2023    Early death Father         cancer    Thyroid disease Father     Arthritis Sister     Obesity Maternal Aunt     Obesity Maternal Uncle     Arthritis Paternal Grandmother     Cancer Paternal Grandmother     Cancer Paternal Grandfather     COPD Paternal Grandfather        Review of Systems:  Review of Systems   Constitutional:  Positive for fatigue.   Gastrointestinal:  Positive for constipation and diarrhea.   Musculoskeletal:  Positive for arthralgias.   All other systems reviewed and are negative.      Physical Exam:  Vital Signs:  Weight: (!) 140 kg (309 lb)   Body mass index is 42.31 kg/m².  Temp: 97.8 °F (36.6 °C)   Heart Rate: 61   BP: 136/74     Physical Exam  Vitals reviewed.   HENT:      Head: Normocephalic and atraumatic.      Mouth/Throat:      Mouth: Mucous membranes are moist.      Pharynx: Oropharynx is clear.   Eyes:      General: No scleral icterus.     Extraocular Movements: Extraocular movements intact.      Conjunctiva/sclera: Conjunctivae normal.      Pupils: Pupils are equal, round, and reactive to light.   Neck:      Thyroid: No thyromegaly.   Cardiovascular:      Rate and Rhythm: Normal rate.   Pulmonary:      Effort: Pulmonary effort is normal. No respiratory distress.      Breath sounds: Normal breath sounds. No stridor. No wheezing or rhonchi.   Abdominal:      General: Bowel sounds are normal.      Palpations: Abdomen is soft.      Tenderness: There is no abdominal tenderness. There is no right CVA tenderness, left CVA tenderness, guarding or rebound.      Hernia: No hernia is present.   Musculoskeletal:         General: Normal range of motion.       Cervical back: Normal range of motion and neck supple.   Lymphadenopathy:      Cervical: No cervical adenopathy.   Skin:     General: Skin is warm and dry.      Findings: No erythema.   Neurological:      Mental Status: She is alert and oriented to person, place, and time.   Psychiatric:         Mood and Affect: Mood normal.         Behavior: Behavior normal.         Thought Content: Thought content normal.         Judgment: Judgment normal.         Assessment:    Meghan Lui is a 33 y.o. year old female with medically complicated severe obesity with a BMI of Body mass index is 42.31 kg/m². and multiple co-morbidities listed in the encounter diagnosis.    I think she is an appropriate candidate for this surgery, and is ready to proceed.    Plan/Discussion/Summary:  No hiatal hernia per upper GI.  No PPI.  H. pylori negative    The patient has returned to the office for a surgical consultation and has requested to proceed with gastric sleeve.  I have had the opportunity to obtain a history, examine the patient and review the patient's chart. The procedure could be done laparoscopically and or robotically.    The patient understands that surgery is a tool and that weight loss is not guaranteed but only seen in the context of appropriate use, regular follow up, exercise and making appropriate food choices.     I personally discussed the potential complications of the laparoscopic gastric sleeve with this patient.  The patient is well aware of potential complications of the surgery that include but not limited to bleeding, infections, deep vein thrombosis, pulmonary embolism, pulmonary complications such as pneumonia, cardiac event, hernias, small bowel obstruction, damage to the spleen or other organs, bowel injury, disfiguring scars, failure to lose weight, need for additional surgery, conversion to an open procedure and death.  The patient is also aware of complications which apply in particular to the gastric  sleeve and can include but not limited to the leakage of gastric contents at the staple line, the development of an intra-abdominal abscess, gastroesophageal reflux disease, Lane's esophagus, ulcers, vitamin/mineral deficiencies, strictures, and the possibility of converting this procedure to a Rhonda-en-Y gastric bypass. The patient also understands the possibility of requiring an acid reducer medication for the rest of their life.    The risks, benefits, potential complications and alternative therapies were discussed at great length as outlined in our extensive consent forms, online consent and educational teaching processes.    The patient has confirmed the participation in the programs extensive educational activities.    All questions and concerns were answered to patient's satisfaction.  The patient now wishes to proceed with surgery.     The patient has agreed to a postoperative course of anitcoagulant therapy.      I instructed patient that the surgery could be laparoscopically and/or robotically.    I instructed patient to start on a H2 blocker or proton pump inhibitor if not already on one of these medications.    I explained in detail the procedures that are in the consent.  All of these procedures have a chance to convert to open if any technical challenges or complications do occur.  Bariatric surgery is not cosmetic surgery but rather a tool to help a patient make a life-long commitment lifestyle change including diet, exercise, behavior changes, and taking supplemental vitamins and minerals.    Problems after surgery may require more operations to correct them.    The risks, benefits, alternatives, and potential complications of all of the procedures were explained in detail including, but not limited to death, anesthesia and medication adverse effect, deep venous thrombosis, pulmonary embolism, trocar site/incisional hernia, wound infection, abdominal infection, bleeding, failure to lose weight,  gain weight, a change in body image, metabolic complications with vitamin deficiences and anemia.    Weight loss expectations were discussed with the patient in detail. The weight loss operations most commonly performed are the sleeve gastrectomy and the Rhonda-en-Y gastric bypass. These operations result in weight losses up to approximately 25-35% of initial body weight 12 to 24 months after surgery with the gastric bypass usually the higher percent of weight loss but depends on patient using the tool.    For the gastric bypass and loop duodenal switch (JANNETH-S) the risks include but not limited to the following early complications:  Anastomotic leak/peritonitis, Rhonda/Alimentary/biliopancreatic limb obstruction, severe & minor wound infection/seroma, and nausea/vomiting.  Late complications can include but are not limited to malnutrition, vitamin deficiencies, frequent loose stools,  stomal stenosis, marginal ulcer, bowel obstruction, intussusception, internal, and incisional hernia.    Regarding the gastric sleeve, there is higher risk of dysphagia and reflux leading to possible Lane's esophagus compared to a gastric bypass, as well as risk of internal visceral/organ injury, splenectomy, bleeding, infection, leak (which could require further intervention possible conversion to Rhonda-en-Y gastric bypass), stenosis and possibility of regaining weight.    Meghan was counseled regarding diagnostic results, instructions for management, risk factor reductions, prognosis, patient and family education, impressions, risks and benefits of treatment options and importance of compliance with treatment. Total time of the encounter was over 45 minutes counseling the patient regarding the procedure as above and reviewing as well as ordering labs, medications and the procedure.  The chart was also reviewed prior to seeing the patient reviewing previous testing, studies and labs.    Meghan understands the surgical procedures  and the different surgical options that are available.  She understands the lifestyle changes that are required after surgery and has agreed to follow the guidelines outlined in the weight management program.  She also expressed understanding of the risks involved and had all of female questions answered and desires to proceed.      Steve Ferraro MD  4/18/2024    Electronically signed by Steve Ferraro Jr., MD at 04/18/24 1083          Operative/Procedure Notes (all)        Steve Ferraro Jr., MD at 04/24/24 0809  Version 1 of 1         PREOPERATIVE DIAGNOSIS:  Morbid obesity with multiple comorbidities as referenced in the most recent history and physical.    POSTOPERATIVE DIAGNOSIS:  Morbid obesity with multiple comorbidities as referenced in the most recent history and physical.  Paraesophageal hernia    PROCEDURES PERFORMED:  1.  Laparoscopic sleeve gastrectomy with Titan stapler # [id number] with buttress  2.  Laparoscopic paraesophageal hernia repair    SURGEON:  Steve Ferraro Jr., MD    ASSISTANT: ASTER David      Surgery assisted and facilitated by a certified physician assistant, who directly resulted in a decreased operative time, anesthetic time, wound exposure, and possibly of an operative wound infection, thereby decreasing patient morbidity and ultimately total expenditures.  The surgical assistant assisted in placement of trochars, take down of the gastrocolic omentum, short gastric vessels and dissection at the angle of His.  Also assisted in retraction of the stomach during stapling so as not to kink the gastric sleeve.  Also assisted in removing of the gastric specimen, closure of the fascial defect as well as closure of the skin incisions.    ANESTHESIA:  General endotracheal and TAP block with Ropivacaine mixture    ESTIMATED BLOOD LOSS:   Less than 25 mL unless dictated below.    FLUIDS:  Crystalloids.    SPECIMENS:  Gastric remnant    DRAINS:  None.    COUNTS:   Correct.    COMPLICATIONS:  None.    INDICATIONS:  This patient with morbid obesity and associated comorbidities presents for elective laparoscopic, possible open sleeve gastrectomy.  The patient has received medical clearance to proceed.  The patient has undergone our extensive educational process and consent process and wishes to proceed.    DESCRIPTION OF PROCEDURE:  The patient was brought to the operating room and placed supine upon the operating room table. SCD hose were placed.  The patient underwent uneventful general endotracheal anesthesia per the anesthesiology staff. The abdomen was prepped with ChloraPrep and draped in the usual sterile fashion.  An Ioban was used as well if not allergic.  Anesthesia staff then passed a 38-Fr balloon bougie into the stomach to decompress.  A 5-10 mm transverse incision was made a few centimeters above and to the left of the umbilicus and the peritoneal cavity entered under direct camera visualization using a 5 or 10 mm 0° laparoscope and an Optiview trocar.  The abdomen was then insufflated to a pressure of 15 mmHg with CO2 gas.  Exploratory laparoscopy revealed no evidence of injury from the entrance technique and no significant abnormalities unless addendum dictated below.  An angled laparoscope was then used.  The patient was placed in reverse Trendelenburg position.  Under direct camera visualization a 19 mm trocar was placed in the right lateral subcostal position.  A 5 mm trocar was placed in the right midabdominal position.  A 5 mm trocar was placed in the left midabdominal position. A Sarah retractor was placed through an epigastric incision and used to elevate the left lobe of the liver.  The fat pad was elevated and the left jyoti exposed.  At this point, approximately residential along the greater curvature, the gastrocolic omentum was divided with the Enseal and this proceeded superiorly to the angle of His taking down the short gastric vessels.  All  posterior attachments of the lesser sac and posterior aspect of the stomach to the pancreas were taken down as well.  The left yjoti was exposed along its length.  Dissection then proceeded medially taking down the greater curvature with an Enseal until just proximal to the pylorus.  The 38 French bougie was then directed distally into the stomach to the pylorus.  The balloon was inflated with 15 cc of saline.  The stomach was marked in the 3 positions using indelible ink.  The 3 markings were at the angle of Hiss, the incisura and antrum using 1cm, 3cm and 4-5cm respectively.  The Titan stapler was then placed through the 19 mm trocar into the abdomen and opened up and the stomach pulled in to the markings on the stomach.  Careful attention was made to stay 1 cm from the esophagus.  Positive pressure retraction was then used to size the stomach perfectly.  The balloon on the bougie was then deflated and placed to suction prior to closing the stapler.  The stapler was then fired and then removed after completion.  Areas of the staple line were oversewn with absorbable sutures as needed for bleeding or questionable staple lines.  At this point, the gastrectomy specimen was withdrawn through the 12 mm trocar site incision. The specimen staple line was inspected and was intact.  The specimen was then sent off to pathology.  At this point, the sleeve was submerged under saline and using the bougie to insufflate the stomach, a leak test was performed.  This revealed the sleeve to be watertight, no air bubbles, no leak, and no bleeding seen from the staple lines and no significant abnormalities.  Irrigation fluid from the abdomen was then suctioned.  The gastric sleeve staple line was then treated with fibrin glue if buttress was not used. The fascia at the 19 mm trocar site incision was closed with a single 0 Vicryl suture in a figure-of-eight fashion placed under direct laparoscopic camera visualization with a suture  passer and tying the knot extracorporeally.  The fascia in the area was infiltrated with local anesthesia. All incisions were then infiltrated with local anesthetic. The remaining trocars were removed under direct camera visualization with no bleeding noted from their sites.  The abdomen was desufflated of gas. The skin in each incision was closed using 4-0 antibiotic impregnated Monocryl in a subcuticular fashion followed by Dermabond.  The patient tolerated the procedure well without complication and was taken to the recovery room in stable condition.  All sponge, needle and instrument counts were correct.     The patient was noted to have a paraesophageal hernia.  The phrenoesophageal membrane was opened up with electrocautery and the right and left crura were cleaned off using sharp and blunt dissection.  The peritoneum overlying the right jyoti was incised and the plane along the hernia sac was developed.  The dissection then extended anteriorly and laterally to the left jyoti.  The base of the crural confluence was dissected free of adhesions to the hernia sac.  The hernia sac was carefully dissected into the mediastinum with caudal traction.  The interfaces between the pleura, pericardium, spine, and aorta were developed as a dissection was carried cephalically to the top of the hernia sac.  Sac contents were completely reduced back into the abdominal cavity.  Careful attention was made not to injure the vagus nerve.  The esophagus was identified and dissected circumferentially and along its previous stomach course in order to reduce tension, allowing the gastroesophageal junction to rest comfortably within the abdominal cavity.  The gastrosplenic ligament and the short gastric vessels were divided with the Enseal device.  The retroesophageal window was developed and the esophagus was retracted caudally.  The crural pillars were then approximated with 2-0 V Loc and continued along the left jyoti to pexy the  "esophagus up to the 12 o'clock position.  The repair was then checked with the bougie and was adequate and not too tight.      Electronically signed by Steve Ferraro Jr., MD at 04/24/24 0845          Discharge Summary        Shahana Pal APRN at 04/25/24 0859       Attestation signed by Steve Ferraro Jr., MD at 04/25/24 1033    I have reviewed this documentation and agree.                  Discharge Summary    Patient name: Meghan Lui    Medical record number: [id number]    Admission date: 4/24/2024  Discharge date:      Attending physician: Dr. Steve Ferraro    Primary care physician: Dionna Valenzuela APRN    Referring physician: Steve Ferraro Jr., MD  [address] [place] [address]    Condition on discharge: Stable    Primary Diagnoses:  Morbid obesity with co-morbidities    Operative Procedure:  Laparoscopic gastric sleeve w/ PHR    Subjective Meghan Lui  is post op day one status post procedure listed. Patient denies shortness of air and lower extremity pain. Feels better than yesterday. No vomiting this am. Ambulating well and using incentive spirometer.      Objective   /78 (BP Location: Right arm, Patient Position: Lying)   Pulse 54   Temp 98 °F (36.7 °C) (Oral)   Resp 16   Ht 185.4 cm (73\")   Wt (!) 140 kg (309 lb)   SpO2 95%   BMI 40.77 kg/m²     General:  alert, appears stated age, cooperative, and no distress   Abdomen: soft, bowel sounds active, appropriate tenderness   Incision:   healing well, no drainage, no erythema, no hernia, no seroma, no swelling, no dehiscence, incision well approximated   Heart: Regular rate   Lungs: Clear to auscultation bilaterally     I reviewed the patient's new clinical results.     Lab Results (last 24 hours)       Procedure Component Value Units Date/Time    Comprehensive Metabolic Panel [[id number]]  (Abnormal) Collected: 04/25/24 0557    Specimen: Blood Updated: 04/25/24 0635     Glucose 95 mg/dL      " BUN 14 mg/dL      Creatinine 0.84 mg/dL      Sodium 139 mmol/L      Potassium 3.9 mmol/L      Chloride 103 mmol/L      CO2 26.0 mmol/L      Calcium 8.3 mg/dL      Total Protein 6.2 g/dL      Albumin 3.9 g/dL      ALT (SGPT) 27 U/L      AST (SGOT) 22 U/L      Alkaline Phosphatase 51 U/L      Total Bilirubin 0.3 mg/dL      Globulin 2.3 gm/dL      A/G Ratio 1.7 g/dL      BUN/Creatinine Ratio 16.7     Anion Gap 10.0 mmol/L      eGFR 94.2 mL/min/1.73     Narrative:      GFR Normal >60  Chronic Kidney Disease <60  Kidney Failure <15      Phosphorus [[id number]]  (Abnormal) Collected: 04/25/24 0557    Specimen: Blood Updated: 04/25/24 0635     Phosphorus 2.1 mg/dL     Magnesium [[id number]]  (Normal) Collected: 04/25/24 0557    Specimen: Blood Updated: 04/25/24 0635     Magnesium 2.0 mg/dL     CBC & Differential [[id number]]  (Abnormal) Collected: 04/25/24 0557    Specimen: Blood Updated: 04/25/24 0617    Narrative:      The following orders were created for panel order CBC & Differential.  Procedure                               Abnormality         Status                     ---------                               -----------         ------                     CBC Auto Differential[[id number]]        Abnormal            Final result                 Please view results for these tests on the individual orders.    CBC Auto Differential [[id number]]  (Abnormal) Collected: 04/25/24 0557    Specimen: Blood Updated: 04/25/24 0617     WBC 12.13 10*3/mm3      RBC 3.86 10*6/mm3      Hemoglobin 12.2 g/dL      Hematocrit 36.0 %      MCV 93.3 fL      MCH 31.6 pg      MCHC 33.9 g/dL      RDW 12.8 %      RDW-SD 43.8 fl      MPV 9.9 fL      Platelets 246 10*3/mm3      Neutrophil % 78.4 %      Lymphocyte % 13.6 %      Monocyte % 7.4 %      Eosinophil % 0.0 %      Basophil % 0.4 %      Immature Grans % 0.2 %      Neutrophils, Absolute 9.50 10*3/mm3      Lymphocytes, Absolute 1.65 10*3/mm3      Monocytes, Absolute 0.90 10*3/mm3       Eosinophils, Absolute 0.00 10*3/mm3      Basophils, Absolute 0.05 10*3/mm3      Immature Grans, Absolute 0.03 10*3/mm3      nRBC 0.0 /100 WBC     Tissue Pathology Exam [[id number]] Collected: 04/24/24 0835    Specimen: Tissue from Stomach Updated: 04/24/24 0909              Assessment:     Assessment Doing well postoperatively.     Plan:   1. Continue Stage 1 diet  2. Continue with ambulation and Incentive spirometry  3. Plan for discharge home    Patient was seen and examined by Dr. Ferraro.    Hospital Course: The patient is a very pleasant 33 y.o. female that was admitted to the hospital with morbid obesity with co-morbidities- HTN, PCOS. Patient underwent laparoscopic sleeve gastrectomy with paraesophageal hernia repair (see OP note) without complication. The patient was then admitted to the bariatric unit per protocol where they remained stable. POD #1 she was started on a stage 1 bariatric diet which she tolerated so she was able to be discharged home in good condition.      Discharge medications:      Discharge Medications        New Medications        Instructions Start Date   ondansetron ODT 4 MG disintegrating tablet  Commonly known as: ZOFRAN-ODT   4 mg, Translingual, Every 4 Hours PRN             Changes to Medications        Instructions Start Date   Synthroid 137 MCG tablet  Generic drug: levothyroxine  What changed: when to take this   137 mcg, Oral, Daily             Continue These Medications        Instructions Start Date   baclofen 10 MG tablet  Commonly known as: LIORESAL   Take 1 tablet 3 times a day by oral route as needed for 30 days.      cloNIDine 0.1 MG tablet  Commonly known as: CATAPRES   Take 1 tablet by mouth Every Night.      dicyclomine 10 MG capsule  Commonly known as: BENTYL   10 mg, Oral, 4 Times Daily Before Meals & Nightly      Doxepin HCl 3 MG tablet   Take 3 mg by mouth Every Night.      Enoxaparin Sodium 40 MG/0.4ML solution prefilled syringe syringe  Commonly known as:  LOVENOX   40 mg, Subcutaneous, Every 12 Hours Scheduled, Start after surgery unless instructed otherwise      ferrous sulfate 325 (65 Fe) MG tablet   TAKE ONE TABLET BY MOUTH EVERY MORNING WITH FOOD      folic acid 1 MG tablet  Commonly known as: FOLVITE   1 mg, Oral, Daily      gabapentin 800 MG tablet  Commonly known as: NEURONTIN   800 mg, Oral, 2 Times Daily      hydroCHLOROthiazide 12.5 MG tablet   TAKE ONE TABLET BY MOUTH ONCE DAILY      HYDROcodone-acetaminophen 7.5-325 MG per tablet  Commonly known as: NORCO   1 tablet, Oral, Every Morning      hydrOXYzine pamoate 50 MG capsule  Commonly known as: VISTARIL   50 mg, Oral, 3 Times Daily PRN      lamoTRIgine 200 MG tablet  Commonly known as: LaMICtal   200 mg, Oral, 2 Times Daily      lidocaine 2 % jelly  Commonly known as: XYLOCAINE   Topical, As Needed      lisinopril 5 MG tablet  Commonly known as: PRINIVIL,ZESTRIL   5 mg, Oral, Daily      metFORMIN  MG 24 hr tablet  Commonly known as: GLUCOPHAGE-XR   500 mg, Oral, Daily With Breakfast      mupirocin 2 % cream  Commonly known as: BACTROBAN   1 Application, Topical, 3 Times Daily      spironolactone 25 MG tablet  Commonly known as: ALDACTONE   25 mg, Oral, Daily             Stop These Medications      Latoya-Hex 4 4 % solution  Generic drug: Chlorhexidine Gluconate              Discharge instructions:  Per Bariatric manual; per our protocol      Follow-up appointment: Follow up with Dr. Ferraro in the office as scheduled.  If not already scheduled call for appointment at [phone].    Electronically signed by Steve Ferraro Jr., MD at 04/25/24 8470

## 2024-04-29 DIAGNOSIS — M51.36 DDD (DEGENERATIVE DISC DISEASE), LUMBAR: ICD-10-CM

## 2024-05-01 ENCOUNTER — OFFICE VISIT (OUTPATIENT)
Dept: BARIATRICS/WEIGHT MGMT | Facility: CLINIC | Age: 34
End: 2024-05-01
Payer: COMMERCIAL

## 2024-05-01 VITALS
WEIGHT: 284 LBS | HEART RATE: 86 BPM | SYSTOLIC BLOOD PRESSURE: 129 MMHG | TEMPERATURE: 97.7 F | HEIGHT: 72 IN | DIASTOLIC BLOOD PRESSURE: 81 MMHG | BODY MASS INDEX: 38.47 KG/M2

## 2024-05-01 DIAGNOSIS — E78.5 HYPERLIPIDEMIA, UNSPECIFIED HYPERLIPIDEMIA TYPE: ICD-10-CM

## 2024-05-01 DIAGNOSIS — F31.81 BIPOLAR 2 DISORDER: ICD-10-CM

## 2024-05-01 DIAGNOSIS — M25.473 ANKLE EDEMA: ICD-10-CM

## 2024-05-01 DIAGNOSIS — I10 PRIMARY HYPERTENSION: ICD-10-CM

## 2024-05-01 DIAGNOSIS — E66.9 OBESITY, CLASS II, BMI 35-39.9: Primary | ICD-10-CM

## 2024-05-01 DIAGNOSIS — F41.8 DEPRESSION WITH ANXIETY: ICD-10-CM

## 2024-05-01 DIAGNOSIS — E28.2 PCOS (POLYCYSTIC OVARIAN SYNDROME): ICD-10-CM

## 2024-05-01 DIAGNOSIS — R29.818 SUSPECTED SLEEP APNEA: ICD-10-CM

## 2024-05-01 PROBLEM — E66.01 OBESITY, CLASS III, BMI 40-49.9 (MORBID OBESITY): Status: RESOLVED | Noted: 2024-04-09 | Resolved: 2024-05-01

## 2024-05-01 PROBLEM — E66.812 OBESITY, CLASS II, BMI 35-39.9: Status: ACTIVE | Noted: 2024-05-01

## 2024-05-01 PROBLEM — M17.0 OSTEOARTHRITIS OF PATELLOFEMORAL JOINTS OF BOTH KNEES: Status: ACTIVE | Noted: 2024-05-01

## 2024-05-01 PROCEDURE — 3079F DIAST BP 80-89 MM HG: CPT | Performed by: NURSE PRACTITIONER

## 2024-05-01 PROCEDURE — 99024 POSTOP FOLLOW-UP VISIT: CPT | Performed by: NURSE PRACTITIONER

## 2024-05-01 PROCEDURE — 1160F RVW MEDS BY RX/DR IN RCRD: CPT | Performed by: NURSE PRACTITIONER

## 2024-05-01 PROCEDURE — 1159F MED LIST DOCD IN RCRD: CPT | Performed by: NURSE PRACTITIONER

## 2024-05-01 PROCEDURE — 3074F SYST BP LT 130 MM HG: CPT | Performed by: NURSE PRACTITIONER

## 2024-05-01 NOTE — PROGRESS NOTES
[facility] BARIATRIC [facility] BARIATRIC SURGERY  [address]  [facility] [id number] [phone]  [address]  [facility] [id number] [phone]  Dept: [phone]  5/1/2024      Meghan Lui.  [id number]  [id number]  1990  female      Chief Complaint   Patient presents with    Post-op     1 week post op sleeve       BH Post-Op Bariatric Surgery: 3  Meghan Lui is status post laparopscopic Laparoscopic Sleeve procedure, performed on 4/24/24.     HPI:   Today's weight is 129 kg (284 lb) pounds, today's BMI is Body mass index is 38.89 kg/m².,  she  has a  loss of 25 pounds since the last visit and  her  weight loss since surgery is 25 pounds. The patient reports a decreased portion size and loss of appetite.  Meghan Lui denies nausea, vomiting, reflux and reports that she is doing well getting her fluid in. The patient c/o appropriate post-op incisional discomfort that is improving. she is doing well with protein and water intake so far. Taking their vitamins, walking and using IS. Denies fevers, chills, chest pain or shortness of air.      Diet and Exercise: Diet history reviewed and discussed with the patient. Weight loss/gains to date discussed with the patient. No carbonated beverage consumption and exercising regularly- walking frequently.   Supplements: multivitamins, B-12, calcium, iron, B-1 and Vitamin D.   Patient is taking blood thinner as prescribed: lovenox BID  Current outpatient and discharge medications have been reconciled for the patient.  Reviewed by: DARINEL Pérez        Review of Systems   Constitutional:  Positive for appetite change. Negative for fatigue and unexpected weight change.   HENT: Negative.     Eyes: Negative.    Respiratory: Negative.     Cardiovascular: Negative.  Negative for leg swelling.   Gastrointestinal:  Negative for abdominal distention, abdominal pain, constipation, diarrhea, nausea  and vomiting.   Genitourinary:  Negative for difficulty urinating, frequency and urgency.   Musculoskeletal:  Negative for back pain.   Skin: Negative.    Psychiatric/Behavioral: Negative.     All other systems reviewed and are negative.      Patient Active Problem List   Diagnosis    Chondromalacia, bilateral knee    Intractable chronic migraine without aura and without status migrainosus    Insomnia    DDD (degenerative disc disease), lumbar    Neuropathy    Primary hypertension    Recurrent major depressive disorder, in partial remission    Hashimoto's thyroiditis    Irritable bowel syndrome with both constipation and diarrhea    Mood disorder    Migraines    Lumbar radiculopathy    Hypothyroidism    Arthritis    Ankle edema    Hyperlipemia    Overflow stress urinary incontinence in female    Heavy period    Multiple joint pain    Balance disorder    Depression with anxiety    Bipolar 2 disorder    Hidradenitis    PCOS (polycystic ovarian syndrome)    Witnessed apneic spells    Sleep-related bruxism    Restless legs syndrome (RLS)    Hypersomnia    Suspected sleep apnea    Snoring    Hidradenitis suppurativa of anus    Paraesophageal hernia    Osteoarthritis of patellofemoral joints of both knees    Obesity, Class II, BMI 35-39.9       The following portions of the patient's history were reviewed and updated as appropriate: allergies, current medications, past family history, past medical history, past social history, past surgical history, and problem list.    Vitals:    05/01/24 1015   BP: 129/81   Pulse: 86   Temp: 97.7 °F (36.5 °C)       Physical Exam  Vitals and nursing note reviewed.   Constitutional:       Appearance: She is well-developed.   Neck:      Thyroid: No thyromegaly.   Cardiovascular:      Rate and Rhythm: Normal rate.   Pulmonary:      Effort: Pulmonary effort is normal. No respiratory distress.   Abdominal:      Palpations: Abdomen is soft.   Musculoskeletal:         General: No tenderness.    Skin:     General: Skin is warm and dry.   Neurological:      Mental Status: She is alert.   Psychiatric:         Behavior: Behavior normal.         Assessment:   Post-op, the patient is doing well.     Encounter Diagnoses   Name Primary?    Obesity, Class II, BMI 35-39.9 Yes    Primary hypertension     Hyperlipidemia, unspecified hyperlipidemia type     PCOS (polycystic ovarian syndrome)     Bipolar 2 disorder     Depression with anxiety     Ankle edema     Suspected sleep apnea        Plan:   Reviewed with patient the importance of following the manual for diet progression. Increase activity as tolerated. Continue increasing daily intake of protein and water.   Return to work: the patient is to return to 3 weeks from their surgery date with no restrictions unless they develop medical problems in which we will see them back in the office. They received a note in our office today with their return to work date.  Activity restrictions: no lifting, pushing or pulling over 25lbs for 3 weeks.   Recommended patient be sure to get at least 70 grams of protein per day. Discussed with the patient the recommended amount of water per day to intake. Reviewed vitamin requirements. Be sure to do routine exercise and increase activity as tolerated. No asa, nsaids or steroids for 8 weeks if gastric sleeve procedure and lifelong if gastric bypass procedure.. Patient may use miralax as needed if necessary.     Instructions / Recommendations: dietary counseling recommended, recommended a daily protein intake of  grams, vitamin supplement(s) recommended, recommended exercising at least 150 minutes per week, behavior modifications recommended and instructed to call the office for concerns, questions, or problems.     The patient was instructed to follow up at one month follow up appt.     The patient was counseled regarding post op bariatric manual

## 2024-05-03 ENCOUNTER — HOSPITAL ENCOUNTER (EMERGENCY)
Facility: HOSPITAL | Age: 34
Discharge: HOME | End: 2024-05-03
Attending: EMERGENCY MEDICINE
Payer: COMMERCIAL

## 2024-05-03 ENCOUNTER — APPOINTMENT (OUTPATIENT)
Dept: CT IMAGING | Facility: HOSPITAL | Age: 34
End: 2024-05-03
Payer: COMMERCIAL

## 2024-05-03 ENCOUNTER — APPOINTMENT (OUTPATIENT)
Dept: GENERAL RADIOLOGY | Facility: HOSPITAL | Age: 34
End: 2024-05-03
Payer: COMMERCIAL

## 2024-05-03 VITALS
HEART RATE: 74 BPM | OXYGEN SATURATION: 100 % | WEIGHT: 283.29 LBS | TEMPERATURE: 97.8 F | SYSTOLIC BLOOD PRESSURE: 120 MMHG | HEIGHT: 72 IN | BODY MASS INDEX: 38.37 KG/M2 | RESPIRATION RATE: 22 BRPM | DIASTOLIC BLOOD PRESSURE: 73 MMHG

## 2024-05-03 DIAGNOSIS — E86.0 DEHYDRATION: Primary | ICD-10-CM

## 2024-05-03 DIAGNOSIS — R42 POSTURAL DIZZINESS: ICD-10-CM

## 2024-05-03 LAB
ALBUMIN SERPL-MCNC: 4.8 G/DL (ref 3.5–5.2)
ALBUMIN/GLOB SERPL: 1.3 G/DL
ALP SERPL-CCNC: 69 U/L (ref 39–117)
ALT SERPL W P-5'-P-CCNC: 24 U/L (ref 1–33)
ANION GAP SERPL CALCULATED.3IONS-SCNC: 20.4 MMOL/L (ref 5–15)
AST SERPL-CCNC: 12 U/L (ref 1–32)
BACTERIA UR QL AUTO: ABNORMAL /HPF
BASOPHILS # BLD AUTO: 0.22 10*3/MM3 (ref 0–0.2)
BASOPHILS NFR BLD AUTO: 1.4 % (ref 0–1.5)
BILIRUB SERPL-MCNC: 0.6 MG/DL (ref 0–1.2)
BILIRUB UR QL STRIP: NEGATIVE
BUN SERPL-MCNC: 13 MG/DL (ref 6–20)
BUN/CREAT SERPL: 11.4 (ref 7–25)
CALCIUM SPEC-SCNC: 10.8 MG/DL (ref 8.6–10.5)
CHLORIDE SERPL-SCNC: 93 MMOL/L (ref 98–107)
CLARITY UR: ABNORMAL
CO2 SERPL-SCNC: 17.6 MMOL/L (ref 22–29)
COLOR UR: YELLOW
CREAT SERPL-MCNC: 1.14 MG/DL (ref 0.57–1)
DEPRECATED RDW RBC AUTO: 39.9 FL (ref 37–54)
EGFRCR SERPLBLD CKD-EPI 2021: 65.3 ML/MIN/1.73
EOSINOPHIL # BLD AUTO: 0.09 10*3/MM3 (ref 0–0.4)
EOSINOPHIL NFR BLD AUTO: 0.6 % (ref 0.3–6.2)
ERYTHROCYTE [DISTWIDTH] IN BLOOD BY AUTOMATED COUNT: 12.7 % (ref 12.3–15.4)
GLOBULIN UR ELPH-MCNC: 3.8 GM/DL
GLUCOSE SERPL-MCNC: 84 MG/DL (ref 65–99)
GLUCOSE UR STRIP-MCNC: NEGATIVE MG/DL
HCT VFR BLD AUTO: 50.4 % (ref 34–46.6)
HGB BLD-MCNC: 18.1 G/DL (ref 12–15.9)
HGB UR QL STRIP.AUTO: NEGATIVE
HOLD SPECIMEN: NORMAL
HOLD SPECIMEN: NORMAL
HYALINE CASTS UR QL AUTO: ABNORMAL /LPF
IMM GRANULOCYTES # BLD AUTO: 0.05 10*3/MM3 (ref 0–0.05)
IMM GRANULOCYTES NFR BLD AUTO: 0.3 % (ref 0–0.5)
KETONES UR QL STRIP: ABNORMAL
LEUKOCYTE ESTERASE UR QL STRIP.AUTO: ABNORMAL
LYMPHOCYTES # BLD AUTO: 2.9 10*3/MM3 (ref 0.7–3.1)
LYMPHOCYTES NFR BLD AUTO: 18.1 % (ref 19.6–45.3)
MAGNESIUM SERPL-MCNC: 1.7 MG/DL (ref 1.6–2.6)
MCH RBC QN AUTO: 31.3 PG (ref 26.6–33)
MCHC RBC AUTO-ENTMCNC: 35.9 G/DL (ref 31.5–35.7)
MCV RBC AUTO: 87.2 FL (ref 79–97)
MONOCYTES # BLD AUTO: 1.57 10*3/MM3 (ref 0.1–0.9)
MONOCYTES NFR BLD AUTO: 9.8 % (ref 5–12)
NEUTROPHILS NFR BLD AUTO: 11.21 10*3/MM3 (ref 1.7–7)
NEUTROPHILS NFR BLD AUTO: 69.8 % (ref 42.7–76)
NITRITE UR QL STRIP: NEGATIVE
NRBC BLD AUTO-RTO: 0 /100 WBC (ref 0–0.2)
PH UR STRIP.AUTO: 5.5 [PH] (ref 5–8)
PLATELET # BLD AUTO: 425 10*3/MM3 (ref 140–450)
PMV BLD AUTO: 9.9 FL (ref 6–12)
POTASSIUM SERPL-SCNC: 3.7 MMOL/L (ref 3.5–5.2)
PROT SERPL-MCNC: 8.6 G/DL (ref 6–8.5)
PROT UR QL STRIP: ABNORMAL
RBC # BLD AUTO: 5.78 10*6/MM3 (ref 3.77–5.28)
RBC # UR STRIP: ABNORMAL /HPF
REF LAB TEST METHOD: ABNORMAL
SODIUM SERPL-SCNC: 131 MMOL/L (ref 136–145)
SP GR UR STRIP: 1.02 (ref 1–1.03)
SQUAMOUS #/AREA URNS HPF: ABNORMAL /HPF
TROPONIN T SERPL HS-MCNC: <6 NG/L
UROBILINOGEN UR QL STRIP: ABNORMAL
WBC # UR STRIP: ABNORMAL /HPF
WBC NRBC COR # BLD AUTO: 16.04 10*3/MM3 (ref 3.4–10.8)
WHOLE BLOOD HOLD COAG: NORMAL
WHOLE BLOOD HOLD SPECIMEN: NORMAL

## 2024-05-03 PROCEDURE — 81001 URINALYSIS AUTO W/SCOPE: CPT | Performed by: EMERGENCY MEDICINE

## 2024-05-03 PROCEDURE — 84484 ASSAY OF TROPONIN QUANT: CPT | Performed by: EMERGENCY MEDICINE

## 2024-05-03 PROCEDURE — 87086 URINE CULTURE/COLONY COUNT: CPT | Performed by: EMERGENCY MEDICINE

## 2024-05-03 PROCEDURE — 71045 X-RAY EXAM CHEST 1 VIEW: CPT

## 2024-05-03 PROCEDURE — 25810000003 SODIUM CHLORIDE 0.9 % SOLUTION: Performed by: EMERGENCY MEDICINE

## 2024-05-03 PROCEDURE — 80053 COMPREHEN METABOLIC PANEL: CPT | Performed by: EMERGENCY MEDICINE

## 2024-05-03 PROCEDURE — 25510000001 IOPAMIDOL PER 1 ML: Performed by: EMERGENCY MEDICINE

## 2024-05-03 PROCEDURE — 83735 ASSAY OF MAGNESIUM: CPT | Performed by: EMERGENCY MEDICINE

## 2024-05-03 PROCEDURE — 71260 CT THORAX DX C+: CPT

## 2024-05-03 PROCEDURE — 85025 COMPLETE CBC W/AUTO DIFF WBC: CPT | Performed by: EMERGENCY MEDICINE

## 2024-05-03 PROCEDURE — 93005 ELECTROCARDIOGRAM TRACING: CPT | Performed by: EMERGENCY MEDICINE

## 2024-05-03 PROCEDURE — 93005 ELECTROCARDIOGRAM TRACING: CPT

## 2024-05-03 PROCEDURE — 99285 EMERGENCY DEPT VISIT HI MDM: CPT

## 2024-05-03 RX ORDER — SODIUM CHLORIDE 0.9 % (FLUSH) 0.9 %
10 SYRINGE (ML) INJECTION AS NEEDED
Status: DISCONTINUED | OUTPATIENT
Start: 2024-05-03 | End: 2024-05-04 | Stop reason: HOSPADM

## 2024-05-03 RX ADMIN — IOPAMIDOL 46 ML: 755 INJECTION, SOLUTION INTRAVENOUS at 21:55

## 2024-05-03 RX ADMIN — SODIUM CHLORIDE 1000 ML: 9 INJECTION, SOLUTION INTRAVENOUS at 22:29

## 2024-05-04 NOTE — ED PROVIDER NOTES
Time: 9:02 PM EDT  Date of encounter:  5/3/2024  Independent Historian/Clinical History and Information was obtained by:   Patient    History is limited by: N/A    Chief Complaint: Chest pain, near syncope      History of Present Illness:  Patient is a 33 y.o. year old female who presents to the emergency department for evaluation of chest pain and postural dizziness.  Patient states she had surgery in the past 7 to 10 days for gastric sleeve placement.  Now in the past 2 to 3 days she is having nausea and lightheadedness with standing.  She also complains of heart racing with standing.  She does also have some chest tightness that initially started in her back but now is more localized to the right chest wall.    HPI    Patient Care Team  Primary Care Provider: Dionna Valenzuela APRN    Past Medical History:     Allergies   Allergen Reactions    Remeron [Mirtazapine] Other (See Comments)     Suicidal ideation    Mushroom GI Intolerance    Buspirone Mental Status Change     Suicidal ideation     Past Medical History:   Diagnosis Date    Anemia 2023    iron defiency anemia    Anxiety     Arthritis     Back pain     Bipolar 2 disorder     Bladder problem     Cholelithiasis     Removed 2011    Condition not found     HERNIATED DISC (UNSPECIFIED)     Cyst of skin     Depression     Foot pain, left 2019    Heel pain     Hidradenitis suppurativa     on med    HL (hearing loss)     Hx of blood diseases     Hx of degenerative disc disease     Hyperlipidemia     Hypertension 2015    bloop pressure elevates when with migraines    Hypothyroidism     Ingrown toenail     Irritable bowel syndrome     Leg pain     Leg swelling     Lumbago     Migraines     Mood disorder     Muscle cramps     Neuromuscular disorder 2018    Numbness in feet     Obesity     Thyroid disease     Visual impairment      Past Surgical History:   Procedure Laterality Date     SECTION      , ,     CHOLECYSTECTOMY   2011    DILATION AND CURETTAGE, DIAGNOSTIC / THERAPEUTIC  2016    ENDOSCOPY      GASTRIC SLEEVE LAPAROSCOPIC N/A 4/24/2024    Procedure: Sleeve gastrectomy LAPAROSCOPIC WITH PARAESPHAGEAL HERNIA REPAIR;  Surgeon: Steve Ferraro Jr., MD;  Location: [facility] OR [facility] [place];  Service: Bariatric;  Laterality: N/A;    WISDOM TOOTH EXTRACTION  2011     Family History   Problem Relation Age of Onset    Diabetes Mother     Hypertension Mother     Vision loss Mother     Thyroid disease Mother     Obesity Mother     Cancer Father         Passed 2023    Early death Father         cancer    Thyroid disease Father     Arthritis Sister     Obesity Maternal Aunt     Obesity Maternal Uncle     Arthritis Paternal Grandmother     Cancer Paternal Grandmother     Cancer Paternal Grandfather     COPD Paternal Grandfather     Malig Hyperthermia Neg Hx        Home Medications:  Prior to Admission medications    Medication Sig Start Date End Date Taking? Authorizing Provider   baclofen (LIORESAL) 10 MG tablet Take 1 tablet 3 times a day by oral route as needed for 30 days.    Violetta Xiong MD   cloNIDine (CATAPRES) 0.1 MG tablet Take 1 tablet by mouth Every Night. 3/22/24   Violetta Xiong MD   dicyclomine (BENTYL) 10 MG capsule TAKE ONE CAPSULE BY MOUTH FOUR TIMES A DAY BEFORE MEALS AND AT BEDTIME 12/14/23   Dionna Valenzuela APRN   Doxepin HCl 3 MG tablet Take 3 mg by mouth Every Night.    Violetta Xiong MD   Enoxaparin Sodium (LOVENOX) 40 MG/0.4ML solution prefilled syringe syringe Inject 0.4 mL under the skin into the appropriate area as directed Every 12 (Twelve) Hours for 14 days. Start after surgery unless instructed otherwise 4/18/24 5/2/24  Steve Ferraro Jr., MD   folic acid (FOLVITE) 1 MG tablet Take 1 tablet by mouth Daily. 1/22/24   Dionna Valenzuela APRN   gabapentin (NEURONTIN) 800 MG tablet Take 1 tablet by mouth 2 (Two) Times a Day. 7/25/21   Violetta Xiong MD   hydroCHLOROthiazide  (HYDRODIURIL) 12.5 MG tablet TAKE ONE TABLET BY MOUTH ONCE DAILY  Patient taking differently: Take 1 tablet by mouth Daily. 23   Dionna Valenzuela APRN   HYDROcodone-acetaminophen (NORCO) 7.5-325 MG per tablet Take 1 tablet by mouth Every Morning. 23   ProviderVioletta MD   hydrOXYzine pamoate (VISTARIL) 50 MG capsule Take 1 capsule by mouth 3 (Three) Times a Day As Needed. 7/10/23   Violetta Xiong MD   lamoTRIgine (LaMICtal) 200 MG tablet Take 1 tablet by mouth 2 (Two) Times a Day. 10/3/23   Dionna Valenzuela APRN   lidocaine (XYLOCAINE) 2 % jelly Apply  topically to the appropriate area as directed As Needed for Mild Pain. 23   Viviana Rg APRN   lisinopril (PRINIVIL,ZESTRIL) 5 MG tablet TAKE ONE TABLET BY MOUTH ONCE DAILY 24   Dionna Valenzuela APRN   metFORMIN ER (GLUCOPHAGE-XR) 500 MG 24 hr tablet TAKE ONE TABLET BY MOUTH ONCE DAILY WITH BREAKFAST 24   Dionna Valenzuela APRN   mupirocin (BACTROBAN) 2 % cream Apply 1 Application topically to the appropriate area as directed 3 (Three) Times a Day. 3/13/24   Dionna Valenzuela APRN   ondansetron ODT (ZOFRAN-ODT) 4 MG disintegrating tablet Place 1 tablet on the tongue Every 4 (Four) Hours As Needed for Nausea or Vomiting. 24   Shahana Pal APRN   spironolactone (ALDACTONE) 25 MG tablet TAKE ONE TABLET BY MOUTH ONCE DAILY 24   Dionna Valenzuela APRN   Synthroid 137 MCG tablet TAKE ONE TABLET BY MOUTH EVERY DAY  Patient taking differently: Take 1 tablet by mouth Every Morning. 24   Dionna Valenzuela APRN        Social History:   Social History     Tobacco Use    Smoking status: Former     Current packs/day: 0.00     Average packs/day: 0.5 packs/day for 16.0 years (8.0 ttl pk-yrs)     Types: Cigarettes     Start date: 2005     Quit date: 2021     Years since quittin.8    Smokeless tobacco: Never    Tobacco comments:     VAPES EVERYDAY   smoked 1/2 pack a day quit    had  "smoked for 16 years   Vaping Use    Vaping status: Every Day    Start date: 7/15/2021    Substances: Nicotine, Flavoring    Devices: Disposable   Substance Use Topics    Alcohol use: Not Currently     Comment: CURRENT SOME DAY (rare)    Drug use: Yes     Types: Marijuana     Comment: 1/2 gram daily  last was 2-3 weeks ago         Review of Systems:  Review of Systems   Constitutional:  Negative for chills and fever.   HENT:  Negative for congestion, rhinorrhea and sore throat.    Eyes:  Negative for photophobia.   Respiratory:  Positive for shortness of breath. Negative for apnea, cough and chest tightness.    Cardiovascular:  Positive for chest pain and palpitations.   Gastrointestinal:  Negative for abdominal pain, diarrhea, nausea and vomiting.   Endocrine: Negative.    Genitourinary:  Negative for difficulty urinating and dysuria.   Musculoskeletal:  Negative for back pain, joint swelling and myalgias.   Skin:  Negative for color change and wound.   Allergic/Immunologic: Negative.    Neurological:  Positive for light-headedness. Negative for seizures, syncope and headaches.   Psychiatric/Behavioral: Negative.     All other systems reviewed and are negative.       Physical Exam:  /72 (BP Location: Right arm, Patient Position: Lying)   Pulse 74   Temp 97.8 °F (36.6 °C) (Oral)   Resp 22   Ht 185.4 cm (73\")   Wt 129 kg (283 lb 4.7 oz)   LMP 04/11/2024 (Exact Date)   SpO2 98%   BMI 37.38 kg/m²     Physical Exam  Vitals and nursing note reviewed.   Constitutional:       General: She is awake.      Appearance: Normal appearance.   HENT:      Head: Normocephalic and atraumatic.      Nose: Nose normal.      Mouth/Throat:      Mouth: Mucous membranes are moist.   Eyes:      Extraocular Movements: Extraocular movements intact.      Pupils: Pupils are equal, round, and reactive to light.   Cardiovascular:      Rate and Rhythm: Normal rate and regular rhythm.      Heart sounds: Normal heart sounds.   Pulmonary: "      Effort: Pulmonary effort is normal. No respiratory distress.      Breath sounds: Normal breath sounds. No wheezing, rhonchi or rales.   Abdominal:      General: Bowel sounds are normal.      Palpations: Abdomen is soft.      Tenderness: There is no abdominal tenderness. There is no guarding or rebound.      Comments: No rigidity   Musculoskeletal:         General: No tenderness. Normal range of motion.      Cervical back: Normal range of motion and neck supple.   Skin:     General: Skin is warm and dry.      Coloration: Skin is not jaundiced.   Neurological:      General: No focal deficit present.      Mental Status: She is alert. Mental status is at baseline.   Psychiatric:         Mood and Affect: Mood normal.                  Procedures:  Procedures      Medical Decision Making:      Comorbidities that affect care:    Thyroid disease, cholelithiasis, bipolar disorder, hypertension    External Notes reviewed:    Previous Operation Note: Bariatric surgery 4/24/2024.  Description: Sleeve gastrectomy, laparoscopic with paraesophageal hernia repair      The following orders were placed and all results were independently analyzed by me:  Orders Placed This Encounter   Procedures    Urine Culture - Urine, Urine, Clean Catch    XR Chest 1 View    CT Chest With Contrast Diagnostic    [place] Draw    Comprehensive Metabolic Panel    Single High Sensitivity Troponin T    Magnesium    Urinalysis With Microscopic If Indicated (No Culture) - Urine, Clean Catch    CBC Auto Differential    Urinalysis, Microscopic Only - Urine, Clean Catch    NPO Diet NPO Type: Strict NPO    Undress & Gown    Continuous Pulse Oximetry    Vital Signs    Orthostatic Blood Pressure    Orthostatic Vitals (Blood Pressure & Heart Rate)    Oxygen Therapy- Nasal Cannula; Titrate 1-6 LPM Per SpO2; 90 - 95%    POC Glucose Once    ECG 12 Lead ED Triage Standing Order; Weak / Dizzy / AMS    Insert Peripheral IV    Fall Precautions    CBC & Differential     Green Top (Gel)    Lavender Top    Gold Top - SST    Light Blue Top       Medications Given in the Emergency Department:  Medications   sodium chloride 0.9 % flush 10 mL (has no administration in time range)   sodium chloride 0.9 % bolus 1,000 mL (1,000 mL Intravenous New Bag 5/3/24 2229)   iopamidol (ISOVUE-370) 76 % injection 100 mL (46 mL Intravenous Given 5/3/24 2155)        ED Course:    ED Course as of 05/03/24 2322   Fri May 03, 2024   2054 I have personally interpreted the EKG today and it shows no evidence of any acute ischemia or heart arrhythmia. [RP]      ED Course User Index  [RP] Mendel Manzanares MD       Labs:    Lab Results (last 24 hours)       Procedure Component Value Units Date/Time    CBC & Differential [[id number]]  (Abnormal) Collected: 05/03/24 2142    Specimen: Blood Updated: 05/03/24 2150    Narrative:      The following orders were created for panel order CBC & Differential.  Procedure                               Abnormality         Status                     ---------                               -----------         ------                     CBC Auto Differential[[id number]]        Abnormal            Final result                 Please view results for these tests on the individual orders.    Comprehensive Metabolic Panel [[id number]]  (Abnormal) Collected: 05/03/24 2142    Specimen: Blood Updated: 05/03/24 2211     Glucose 84 mg/dL      BUN 13 mg/dL      Creatinine 1.14 mg/dL      Sodium 131 mmol/L      Potassium 3.7 mmol/L      Chloride 93 mmol/L      CO2 17.6 mmol/L      Calcium 10.8 mg/dL      Total Protein 8.6 g/dL      Albumin 4.8 g/dL      ALT (SGPT) 24 U/L      AST (SGOT) 12 U/L      Alkaline Phosphatase 69 U/L      Total Bilirubin 0.6 mg/dL      Globulin 3.8 gm/dL      A/G Ratio 1.3 g/dL      BUN/Creatinine Ratio 11.4     Anion Gap 20.4 mmol/L      eGFR 65.3 mL/min/1.73     Narrative:      GFR Normal >60  Chronic Kidney Disease <60  Kidney Failure <15      Single High  Sensitivity Troponin T [[id number]]  (Normal) Collected: 05/03/24 2142    Specimen: Blood Updated: 05/03/24 2211     HS Troponin T <6 ng/L     Narrative:      High Sensitive Troponin T Reference Range:  <14.0 ng/L- Negative Female for AMI  <22.0 ng/L- Negative Male for AMI  >=14 - Abnormal Female indicating possible myocardial injury.  >=22 - Abnormal Male indicating possible myocardial injury.   Clinicians would have to utilize clinical acumen, EKG, Troponin, and serial changes to determine if it is an Acute Myocardial Infarction or myocardial injury due to an underlying chronic condition.         Magnesium [[id number]]  (Normal) Collected: 05/03/24 2142    Specimen: Blood Updated: 05/03/24 2211     Magnesium 1.7 mg/dL     Urinalysis With Microscopic If Indicated (No Culture) - Urine, Clean Catch [[id number]]  (Abnormal) Collected: 05/03/24 2142    Specimen: Urine, Clean Catch Updated: 05/03/24 2157     Color, UA Yellow     Appearance, UA Cloudy     pH, UA 5.5     Specific Gravity, UA 1.022     Glucose, UA Negative     Ketones, UA >=160 mg/dL (4+)     Bilirubin, UA Negative     Blood, UA Negative     Protein, UA 30 mg/dL (1+)     Leuk Esterase, UA Trace     Nitrite, UA Negative     Urobilinogen, UA 1.0 E.U./dL    CBC Auto Differential [[id number]]  (Abnormal) Collected: 05/03/24 2142    Specimen: Blood Updated: 05/03/24 2150     WBC 16.04 10*3/mm3      RBC 5.78 10*6/mm3      Hemoglobin 18.1 g/dL      Hematocrit 50.4 %      MCV 87.2 fL      MCH 31.3 pg      MCHC 35.9 g/dL      RDW 12.7 %      RDW-SD 39.9 fl      MPV 9.9 fL      Platelets 425 10*3/mm3      Neutrophil % 69.8 %      Lymphocyte % 18.1 %      Monocyte % 9.8 %      Eosinophil % 0.6 %      Basophil % 1.4 %      Immature Grans % 0.3 %      Neutrophils, Absolute 11.21 10*3/mm3      Lymphocytes, Absolute 2.90 10*3/mm3      Monocytes, Absolute 1.57 10*3/mm3      Eosinophils, Absolute 0.09 10*3/mm3      Basophils, Absolute 0.22 10*3/mm3      Immature Grans,  Absolute 0.05 10*3/mm3      nRBC 0.0 /100 WBC     Urinalysis, Microscopic Only - Urine, Clean Catch [[id number]]  (Abnormal) Collected: 05/03/24 2142    Specimen: Urine, Clean Catch Updated: 05/03/24 2211     RBC, UA 0-2 /HPF      WBC, UA 3-5 /HPF      Bacteria, UA 2+ /HPF      Squamous Epithelial Cells, UA 13-20 /HPF      Hyaline Casts, UA 7-12 /LPF      Methodology Manual Light Microscopy    Urine Culture - Urine, Urine, Clean Catch [[id number]] Collected: 05/03/24 2142    Specimen: Urine, Clean Catch Updated: 05/03/24 2320             Imaging:    CT Chest With Contrast Diagnostic    Result Date: 5/3/2024  CT CHEST W CONTRAST DIAGNOSTIC-  Date of Exam: 5/3/2024 9:48 PM  Indication: PE protocol.  Right-sided chest pain with recent surgery.  Comparisons: 5/3/2024; 3/20/2024.  Technique: Axial CT images were obtained of the chest after the uneventful intravenous administration of 46 mL of Isovue-370 contrast agent.  Reconstructed 2D coronal and sagittal images were also obtained. Automated exposure control and iterative construction methods were used. No 3D reformations/reconstructions are provided for review. Pulmonary CTA protocol was utilized.  FINDINGS: LUNGS: No acute infiltrate. No suspicious pulmonary nodules. VASCULATURE: No pulmonary embolism. No coronary artery calcifications. GREGG: No enlarged hilar lymph nodes. MEDIASTINUM: No enlarged mediastinal lymph nodes. CARDIAC: No cardiac enlargement. No pericardial effusion. AORTA: No thoracic aortic aneurysm or dissection. PLEURA: No pleural effusion. No pneumothorax. CHEST WALL: No mass or axillary adenopathy. No subcutaneous emphysema. No focal fluid collection. LIMITED ABDOMEN: No acute findings are seen in the partially imaged upper abdomen. The patient has undergone sleeve gastrectomy, new since the prior exam. BONES: No acute fracture. No aggressive osseous lesion. OTHER: The central tracheobronchial tree is well aerated without filling defect.  CONCLUSION:  No pulmonary embolism. No acute infiltrate.    Please note that portions of this note were completed with a voice recognition program.    Electronically Signed By-Jonnie Campos MD On:5/3/2024 10:01 PM      XR Chest 1 View    Result Date: 5/3/2024  XR CHEST 1 VW-  Date of Exam: 5/3/2024 8:10 PM  Indication: Weak/Dizzy/AMS triage protocol  Comparison: Prior chest radiographs dating back to 12/1/2019  FINDINGS:  The lungs are well-expanded. The heart and pulmonary vasculature are within normal limits. No pleural effusions are identified. There are no active appearing infiltrates.      No active disease.   Electronically Signed By-JENN PETER MD On:5/3/2024 8:49 PM         Differential Diagnosis and Discussion:    Chest Pain:  Based on the patient's signs and symptoms, I considered aortic dissection, myocardial infaction, pulmonary embolism, cardiac tamponade, pericarditis, pneumothorax, musculoskeletal chest pain and other differential diagnosis as an etiology of the patient's chest pain.     All labs were reviewed and interpreted by me.  All X-rays impressions were independently interpreted by me.  EKG was interpreted by me.  CT scan radiology impression was interpreted by me.    MDM     Amount and/or Complexity of Data Reviewed  Decide to obtain previous medical records or to obtain history from someone other than the patient: yes                 Patient Care Considerations:    CONSULT: I considered consulting cardiology, however EKG is nonischemic and troponin is negative.      Consultants/Shared Management Plan:    None    Social Determinants of Health:    Patient is independent, reliable, and has access to care.       Disposition and Care Coordination:    Discharged: I considered escalation of care by admitting this patient to the hospital, however patient is well-appearing.  Her vital signs are normal.  She understands return warnings and seems to be a reliable person.    I have explained the patient´s  condition, diagnoses and treatment plan based on the information available to me at this time. I have answered questions and addressed any concerns. The patient has a good  understanding of the patient´s diagnosis, condition, and treatment plan as can be expected at this point. The vital signs have been stable. The patient´s condition is stable and appropriate for discharge from the emergency department.      The patient will pursue further outpatient evaluation with the primary care physician or other designated or consulting physician as outlined in the discharge instructions. They are agreeable to this plan of care and follow-up instructions have been explained in detail. The patient has received these instructions in written format and has expressed an understanding of the discharge instructions. The patient is aware that any significant change in condition or worsening of symptoms should prompt an immediate return to this or the closest emergency department or call to 911.    Final diagnoses:   Dehydration   Postural dizziness        ED Disposition       ED Disposition   Discharge    Condition   Stable    Comment   --               This medical record created using voice recognition software.             Mendel Manzanares MD  05/03/24 [id number]

## 2024-05-04 NOTE — DISCHARGE INSTRUCTIONS
Stay well-hydrated.  Return the emergency department immediately for fever, worsening of symptoms.  Follow-up with your primary care provider Monday morning to discuss urine culture results.

## 2024-05-05 LAB
BACTERIA SPEC AEROBE CULT: NORMAL
QT INTERVAL: 388 MS
QTC INTERVAL: 472 MS

## 2024-05-08 ENCOUNTER — OFFICE VISIT (OUTPATIENT)
Dept: NEUROLOGY | Facility: CLINIC | Age: 34
End: 2024-05-08
Payer: COMMERCIAL

## 2024-05-08 ENCOUNTER — PATIENT ROUNDING (BHMG ONLY) (OUTPATIENT)
Dept: NEUROLOGY | Facility: CLINIC | Age: 34
End: 2024-05-08
Payer: COMMERCIAL

## 2024-05-08 VITALS
HEIGHT: 72 IN | SYSTOLIC BLOOD PRESSURE: 113 MMHG | HEART RATE: 82 BPM | BODY MASS INDEX: 37.93 KG/M2 | WEIGHT: 280 LBS | DIASTOLIC BLOOD PRESSURE: 76 MMHG

## 2024-05-08 DIAGNOSIS — G25.3 DRUG-INDUCED MYOCLONUS: ICD-10-CM

## 2024-05-08 DIAGNOSIS — T50.905A DRUG-INDUCED MYOCLONUS: ICD-10-CM

## 2024-05-08 DIAGNOSIS — J90 PLEURAL EFFUSION: ICD-10-CM

## 2024-05-08 DIAGNOSIS — G43.009 MIGRAINE WITHOUT AURA AND WITHOUT STATUS MIGRAINOSUS, NOT INTRACTABLE: Primary | ICD-10-CM

## 2024-05-08 DIAGNOSIS — R60.0 LOWER EXTREMITY EDEMA: ICD-10-CM

## 2024-05-08 RX ORDER — HYDROCHLOROTHIAZIDE 12.5 MG/1
12.5 TABLET ORAL DAILY
COMMUNITY
End: 2024-05-11 | Stop reason: HOSPADM

## 2024-05-08 RX ORDER — TOPIRAMATE 50 MG/1
TABLET, FILM COATED ORAL
Qty: 60 TABLET | Refills: 5 | Status: SHIPPED | OUTPATIENT
Start: 2024-05-08

## 2024-05-08 RX ORDER — LEVOTHYROXINE SODIUM 137 UG/1
137 TABLET ORAL
COMMUNITY

## 2024-05-09 ENCOUNTER — APPOINTMENT (OUTPATIENT)
Dept: GENERAL RADIOLOGY | Facility: HOSPITAL | Age: 34
End: 2024-05-09
Payer: COMMERCIAL

## 2024-05-09 ENCOUNTER — HOSPITAL ENCOUNTER (OUTPATIENT)
Facility: HOSPITAL | Age: 34
Setting detail: OBSERVATION
Discharge: HOME | End: 2024-05-11
Attending: EMERGENCY MEDICINE | Admitting: STUDENT IN AN ORGANIZED HEALTH CARE EDUCATION/TRAINING PROGRAM
Payer: COMMERCIAL

## 2024-05-09 DIAGNOSIS — R11.0 NAUSEA: ICD-10-CM

## 2024-05-09 DIAGNOSIS — E87.1 HYPONATREMIA: Primary | ICD-10-CM

## 2024-05-09 DIAGNOSIS — R42 DIZZINESS: ICD-10-CM

## 2024-05-09 DIAGNOSIS — R51.9 NONINTRACTABLE HEADACHE, UNSPECIFIED CHRONICITY PATTERN, UNSPECIFIED HEADACHE TYPE: ICD-10-CM

## 2024-05-09 LAB
ALBUMIN SERPL-MCNC: 4.1 G/DL (ref 3.5–5.2)
ALBUMIN/GLOB SERPL: 1.4 G/DL
ALP SERPL-CCNC: 54 U/L (ref 39–117)
ALT SERPL W P-5'-P-CCNC: 19 U/L (ref 1–33)
ANION GAP SERPL CALCULATED.3IONS-SCNC: 16.6 MMOL/L (ref 5–15)
AST SERPL-CCNC: 13 U/L (ref 1–32)
BASOPHILS # BLD AUTO: 0.11 10*3/MM3 (ref 0–0.2)
BASOPHILS NFR BLD AUTO: 1 % (ref 0–1.5)
BILIRUB SERPL-MCNC: 0.4 MG/DL (ref 0–1.2)
BUN SERPL-MCNC: 9 MG/DL (ref 6–20)
BUN/CREAT SERPL: 10.5 (ref 7–25)
CALCIUM SPEC-SCNC: 9.7 MG/DL (ref 8.6–10.5)
CHLORIDE SERPL-SCNC: 89 MMOL/L (ref 98–107)
CO2 SERPL-SCNC: 23.4 MMOL/L (ref 22–29)
CREAT SERPL-MCNC: 0.86 MG/DL (ref 0.57–1)
DEPRECATED RDW RBC AUTO: 37.6 FL (ref 37–54)
EGFRCR SERPLBLD CKD-EPI 2021: 91.6 ML/MIN/1.73
EOSINOPHIL # BLD AUTO: 0.07 10*3/MM3 (ref 0–0.4)
EOSINOPHIL NFR BLD AUTO: 0.6 % (ref 0.3–6.2)
ERYTHROCYTE [DISTWIDTH] IN BLOOD BY AUTOMATED COUNT: 11.9 % (ref 12.3–15.4)
GLOBULIN UR ELPH-MCNC: 3 GM/DL
GLUCOSE SERPL-MCNC: 102 MG/DL (ref 65–99)
HCT VFR BLD AUTO: 41.9 % (ref 34–46.6)
HGB BLD-MCNC: 15.3 G/DL (ref 12–15.9)
HOLD SPECIMEN: NORMAL
HOLD SPECIMEN: NORMAL
IMM GRANULOCYTES # BLD AUTO: 0.03 10*3/MM3 (ref 0–0.05)
IMM GRANULOCYTES NFR BLD AUTO: 0.3 % (ref 0–0.5)
LYMPHOCYTES # BLD AUTO: 2.3 10*3/MM3 (ref 0.7–3.1)
LYMPHOCYTES NFR BLD AUTO: 21.1 % (ref 19.6–45.3)
MAGNESIUM SERPL-MCNC: 1.7 MG/DL (ref 1.6–2.6)
MCH RBC QN AUTO: 31.6 PG (ref 26.6–33)
MCHC RBC AUTO-ENTMCNC: 36.5 G/DL (ref 31.5–35.7)
MCV RBC AUTO: 86.6 FL (ref 79–97)
MONOCYTES # BLD AUTO: 1.02 10*3/MM3 (ref 0.1–0.9)
MONOCYTES NFR BLD AUTO: 9.3 % (ref 5–12)
NEUTROPHILS NFR BLD AUTO: 67.7 % (ref 42.7–76)
NEUTROPHILS NFR BLD AUTO: 7.39 10*3/MM3 (ref 1.7–7)
NRBC BLD AUTO-RTO: 0 /100 WBC (ref 0–0.2)
PLATELET # BLD AUTO: 338 10*3/MM3 (ref 140–450)
PMV BLD AUTO: 9.9 FL (ref 6–12)
POTASSIUM SERPL-SCNC: 3.8 MMOL/L (ref 3.5–5.2)
PROT SERPL-MCNC: 7.1 G/DL (ref 6–8.5)
RBC # BLD AUTO: 4.84 10*6/MM3 (ref 3.77–5.28)
SODIUM SERPL-SCNC: 129 MMOL/L (ref 136–145)
TROPONIN T SERPL HS-MCNC: <6 NG/L
WBC NRBC COR # BLD AUTO: 10.92 10*3/MM3 (ref 3.4–10.8)
WHOLE BLOOD HOLD COAG: NORMAL
WHOLE BLOOD HOLD SPECIMEN: NORMAL

## 2024-05-09 PROCEDURE — 85025 COMPLETE CBC W/AUTO DIFF WBC: CPT

## 2024-05-09 PROCEDURE — 83735 ASSAY OF MAGNESIUM: CPT

## 2024-05-09 PROCEDURE — 84484 ASSAY OF TROPONIN QUANT: CPT

## 2024-05-09 PROCEDURE — 93005 ELECTROCARDIOGRAM TRACING: CPT

## 2024-05-09 PROCEDURE — 71045 X-RAY EXAM CHEST 1 VIEW: CPT

## 2024-05-09 PROCEDURE — G0378 HOSPITAL OBSERVATION PER HR: HCPCS

## 2024-05-09 PROCEDURE — 93010 ELECTROCARDIOGRAM REPORT: CPT | Performed by: INTERNAL MEDICINE

## 2024-05-09 PROCEDURE — 93005 ELECTROCARDIOGRAM TRACING: CPT | Performed by: EMERGENCY MEDICINE

## 2024-05-09 PROCEDURE — 87637 SARSCOV2&INF A&B&RSV AMP PRB: CPT

## 2024-05-09 PROCEDURE — 84439 ASSAY OF FREE THYROXINE: CPT | Performed by: STUDENT IN AN ORGANIZED HEALTH CARE EDUCATION/TRAINING PROGRAM

## 2024-05-09 PROCEDURE — 84443 ASSAY THYROID STIM HORMONE: CPT | Performed by: STUDENT IN AN ORGANIZED HEALTH CARE EDUCATION/TRAINING PROGRAM

## 2024-05-09 PROCEDURE — 36415 COLL VENOUS BLD VENIPUNCTURE: CPT

## 2024-05-09 PROCEDURE — 80053 COMPREHEN METABOLIC PANEL: CPT

## 2024-05-09 PROCEDURE — 99285 EMERGENCY DEPT VISIT HI MDM: CPT

## 2024-05-09 RX ORDER — SODIUM CHLORIDE 0.9 % (FLUSH) 0.9 %
10 SYRINGE (ML) INJECTION AS NEEDED
Status: DISCONTINUED | OUTPATIENT
Start: 2024-05-09 | End: 2024-05-11 | Stop reason: HOSPADM

## 2024-05-10 ENCOUNTER — APPOINTMENT (OUTPATIENT)
Dept: CARDIOLOGY | Facility: HOSPITAL | Age: 34
End: 2024-05-10
Payer: COMMERCIAL

## 2024-05-10 PROBLEM — E87.1 HYPONATREMIA: Status: ACTIVE | Noted: 2024-05-10

## 2024-05-10 LAB
ALBUMIN SERPL-MCNC: 3.7 G/DL (ref 3.5–5.2)
ALBUMIN/GLOB SERPL: 1.6 G/DL
ALP SERPL-CCNC: 45 U/L (ref 39–117)
ALT SERPL W P-5'-P-CCNC: 15 U/L (ref 1–33)
ANION GAP SERPL CALCULATED.3IONS-SCNC: 13.8 MMOL/L (ref 5–15)
AST SERPL-CCNC: 11 U/L (ref 1–32)
BACTERIA UR QL AUTO: ABNORMAL /HPF
BASOPHILS # BLD AUTO: 0.09 10*3/MM3 (ref 0–0.2)
BASOPHILS NFR BLD AUTO: 1.1 % (ref 0–1.5)
BH CV ECHO MEAS - AO ROOT DIAM: 3 CM
BH CV ECHO MEAS - EDV(CUBED): 64.5 ML
BH CV ECHO MEAS - EDV(MOD-SP2): 113 ML
BH CV ECHO MEAS - EDV(MOD-SP4): 131 ML
BH CV ECHO MEAS - EF(MOD-BP): 63 %
BH CV ECHO MEAS - EF(MOD-SP2): 58.2 %
BH CV ECHO MEAS - EF(MOD-SP4): 69 %
BH CV ECHO MEAS - ESV(CUBED): 20.8 ML
BH CV ECHO MEAS - ESV(MOD-SP2): 47.2 ML
BH CV ECHO MEAS - ESV(MOD-SP4): 40.6 ML
BH CV ECHO MEAS - FS: 31.4 %
BH CV ECHO MEAS - IVS/LVPW: 0.97 CM
BH CV ECHO MEAS - IVSD: 0.86 CM
BH CV ECHO MEAS - LA DIMENSION: 2.4 CM
BH CV ECHO MEAS - LAT PEAK E' VEL: 13.8 CM/SEC
BH CV ECHO MEAS - LV DIASTOLIC VOL/BSA (35-75): 52.5 CM2
BH CV ECHO MEAS - LV MASS(C)D: 106.5 GRAMS
BH CV ECHO MEAS - LV SYSTOLIC VOL/BSA (12-30): 16.3 CM2
BH CV ECHO MEAS - LVIDD: 4 CM
BH CV ECHO MEAS - LVIDS: 2.8 CM
BH CV ECHO MEAS - LVOT AREA: 2.8 CM2
BH CV ECHO MEAS - LVOT DIAM: 1.9 CM
BH CV ECHO MEAS - LVPWD: 0.89 CM
BH CV ECHO MEAS - MED PEAK E' VEL: 9.3 CM/SEC
BH CV ECHO MEAS - MV A MAX VEL: 60.2 CM/SEC
BH CV ECHO MEAS - MV DEC SLOPE: 510 CM/SEC2
BH CV ECHO MEAS - MV DEC TIME: 0.17 SEC
BH CV ECHO MEAS - MV E MAX VEL: 84.2 CM/SEC
BH CV ECHO MEAS - MV E/A: 1.4
BH CV ECHO MEAS - SV(MOD-SP2): 65.8 ML
BH CV ECHO MEAS - SV(MOD-SP4): 90.4 ML
BH CV ECHO MEAS - SVI(MOD-SP2): 26.4 ML/M2
BH CV ECHO MEAS - SVI(MOD-SP4): 36.2 ML/M2
BH CV ECHO MEASUREMENTS AVERAGE E/E' RATIO: 7.29
BILIRUB SERPL-MCNC: 0.3 MG/DL (ref 0–1.2)
BILIRUB UR QL STRIP: NEGATIVE
BUN SERPL-MCNC: 7 MG/DL (ref 6–20)
BUN/CREAT SERPL: 9.5 (ref 7–25)
CALCIUM SPEC-SCNC: 8.6 MG/DL (ref 8.6–10.5)
CHLORIDE SERPL-SCNC: 96 MMOL/L (ref 98–107)
CLARITY UR: CLEAR
CO2 SERPL-SCNC: 22.2 MMOL/L (ref 22–29)
COLOR UR: YELLOW
CREAT SERPL-MCNC: 0.74 MG/DL (ref 0.57–1)
DEPRECATED RDW RBC AUTO: 39.8 FL (ref 37–54)
EGFRCR SERPLBLD CKD-EPI 2021: 109.7 ML/MIN/1.73
EOSINOPHIL # BLD AUTO: 0.09 10*3/MM3 (ref 0–0.4)
EOSINOPHIL NFR BLD AUTO: 1.1 % (ref 0.3–6.2)
ERYTHROCYTE [DISTWIDTH] IN BLOOD BY AUTOMATED COUNT: 12.3 % (ref 12.3–15.4)
FLUAV SUBTYP SPEC NAA+PROBE: NOT DETECTED
FLUBV RNA ISLT QL NAA+PROBE: NOT DETECTED
GLOBULIN UR ELPH-MCNC: 2.3 GM/DL
GLUCOSE SERPL-MCNC: 79 MG/DL (ref 65–99)
GLUCOSE UR STRIP-MCNC: NEGATIVE MG/DL
HCT VFR BLD AUTO: 38.8 % (ref 34–46.6)
HGB BLD-MCNC: 13.8 G/DL (ref 12–15.9)
HGB UR QL STRIP.AUTO: ABNORMAL
HYALINE CASTS UR QL AUTO: ABNORMAL /LPF
IMM GRANULOCYTES # BLD AUTO: 0.02 10*3/MM3 (ref 0–0.05)
IMM GRANULOCYTES NFR BLD AUTO: 0.2 % (ref 0–0.5)
KETONES UR QL STRIP: ABNORMAL
LEFT ATRIUM VOLUME INDEX: 12.2 ML/M2
LEUKOCYTE ESTERASE UR QL STRIP.AUTO: NEGATIVE
LYMPHOCYTES # BLD AUTO: 2.34 10*3/MM3 (ref 0.7–3.1)
LYMPHOCYTES NFR BLD AUTO: 27.8 % (ref 19.6–45.3)
MAGNESIUM SERPL-MCNC: 1.6 MG/DL (ref 1.6–2.6)
MCH RBC QN AUTO: 31.4 PG (ref 26.6–33)
MCHC RBC AUTO-ENTMCNC: 35.6 G/DL (ref 31.5–35.7)
MCV RBC AUTO: 88.4 FL (ref 79–97)
MONOCYTES # BLD AUTO: 0.69 10*3/MM3 (ref 0.1–0.9)
MONOCYTES NFR BLD AUTO: 8.2 % (ref 5–12)
NEUTROPHILS NFR BLD AUTO: 5.2 10*3/MM3 (ref 1.7–7)
NEUTROPHILS NFR BLD AUTO: 61.6 % (ref 42.7–76)
NITRITE UR QL STRIP: NEGATIVE
NRBC BLD AUTO-RTO: 0 /100 WBC (ref 0–0.2)
PH UR STRIP.AUTO: 6.5 [PH] (ref 5–8)
PHOSPHATE SERPL-MCNC: 2.5 MG/DL (ref 2.5–4.5)
PLATELET # BLD AUTO: 270 10*3/MM3 (ref 140–450)
PMV BLD AUTO: 10.6 FL (ref 6–12)
POTASSIUM SERPL-SCNC: 3.2 MMOL/L (ref 3.5–5.2)
PROT SERPL-MCNC: 6 G/DL (ref 6–8.5)
PROT UR QL STRIP: ABNORMAL
RBC # BLD AUTO: 4.39 10*6/MM3 (ref 3.77–5.28)
RBC # UR STRIP: ABNORMAL /HPF
REF LAB TEST METHOD: ABNORMAL
RSV RNA NPH QL NAA+NON-PROBE: NOT DETECTED
SARS-COV-2 RNA RESP QL NAA+PROBE: NOT DETECTED
SODIUM SERPL-SCNC: 132 MMOL/L (ref 136–145)
SP GR UR STRIP: 1.02 (ref 1–1.03)
SQUAMOUS #/AREA URNS HPF: ABNORMAL /HPF
T-UPTAKE NFR SERPL: 0.89 TBI (ref 0.8–1.3)
T4 FREE SERPL-MCNC: 1.95 NG/DL (ref 0.92–1.68)
T4 SERPL-MCNC: 8.38 MCG/DL (ref 4.5–11.7)
TSH SERPL DL<=0.05 MIU/L-ACNC: 3.46 UIU/ML (ref 0.27–4.2)
TSH SERPL DL<=0.05 MIU/L-ACNC: 4.38 UIU/ML (ref 0.27–4.2)
UROBILINOGEN UR QL STRIP: ABNORMAL
WBC # UR STRIP: ABNORMAL /HPF
WBC NRBC COR # BLD AUTO: 8.43 10*3/MM3 (ref 3.4–10.8)

## 2024-05-10 PROCEDURE — 84479 ASSAY OF THYROID (T3 OR T4): CPT | Performed by: STUDENT IN AN ORGANIZED HEALTH CARE EDUCATION/TRAINING PROGRAM

## 2024-05-10 PROCEDURE — 80053 COMPREHEN METABOLIC PANEL: CPT | Performed by: STUDENT IN AN ORGANIZED HEALTH CARE EDUCATION/TRAINING PROGRAM

## 2024-05-10 PROCEDURE — 93306 TTE W/DOPPLER COMPLETE: CPT

## 2024-05-10 PROCEDURE — 25010000002 HEPARIN (PORCINE) PER 1000 UNITS: Performed by: STUDENT IN AN ORGANIZED HEALTH CARE EDUCATION/TRAINING PROGRAM

## 2024-05-10 PROCEDURE — 25010000002 SULFUR HEXAFLUORIDE MICROSPH 60.7-25 MG RECONSTITUTED SUSPENSION: Performed by: STUDENT IN AN ORGANIZED HEALTH CARE EDUCATION/TRAINING PROGRAM

## 2024-05-10 PROCEDURE — G0378 HOSPITAL OBSERVATION PER HR: HCPCS

## 2024-05-10 PROCEDURE — 25810000003 SODIUM CHLORIDE 0.9 % SOLUTION: Performed by: REGISTERED NURSE

## 2024-05-10 PROCEDURE — 84100 ASSAY OF PHOSPHORUS: CPT | Performed by: STUDENT IN AN ORGANIZED HEALTH CARE EDUCATION/TRAINING PROGRAM

## 2024-05-10 PROCEDURE — 96365 THER/PROPH/DIAG IV INF INIT: CPT

## 2024-05-10 PROCEDURE — 97165 OT EVAL LOW COMPLEX 30 MIN: CPT

## 2024-05-10 PROCEDURE — 85025 COMPLETE CBC W/AUTO DIFF WBC: CPT | Performed by: STUDENT IN AN ORGANIZED HEALTH CARE EDUCATION/TRAINING PROGRAM

## 2024-05-10 PROCEDURE — 81001 URINALYSIS AUTO W/SCOPE: CPT | Performed by: EMERGENCY MEDICINE

## 2024-05-10 PROCEDURE — 84436 ASSAY OF TOTAL THYROXINE: CPT | Performed by: STUDENT IN AN ORGANIZED HEALTH CARE EDUCATION/TRAINING PROGRAM

## 2024-05-10 PROCEDURE — 25010000002 ONDANSETRON PER 1 MG: Performed by: REGISTERED NURSE

## 2024-05-10 PROCEDURE — 25010000002 POTASSIUM CHLORIDE 10 MEQ/100ML SOLUTION: Performed by: STUDENT IN AN ORGANIZED HEALTH CARE EDUCATION/TRAINING PROGRAM

## 2024-05-10 PROCEDURE — 96372 THER/PROPH/DIAG INJ SC/IM: CPT

## 2024-05-10 PROCEDURE — 83735 ASSAY OF MAGNESIUM: CPT | Performed by: STUDENT IN AN ORGANIZED HEALTH CARE EDUCATION/TRAINING PROGRAM

## 2024-05-10 PROCEDURE — 96375 TX/PRO/DX INJ NEW DRUG ADDON: CPT

## 2024-05-10 PROCEDURE — 93306 TTE W/DOPPLER COMPLETE: CPT | Performed by: INTERNAL MEDICINE

## 2024-05-10 PROCEDURE — 84443 ASSAY THYROID STIM HORMONE: CPT | Performed by: STUDENT IN AN ORGANIZED HEALTH CARE EDUCATION/TRAINING PROGRAM

## 2024-05-10 PROCEDURE — 99223 1ST HOSP IP/OBS HIGH 75: CPT | Performed by: STUDENT IN AN ORGANIZED HEALTH CARE EDUCATION/TRAINING PROGRAM

## 2024-05-10 PROCEDURE — 96366 THER/PROPH/DIAG IV INF ADDON: CPT

## 2024-05-10 RX ORDER — HEPARIN SODIUM 5000 [USP'U]/ML
5000 INJECTION, SOLUTION INTRAVENOUS; SUBCUTANEOUS EVERY 8 HOURS SCHEDULED
Status: DISCONTINUED | OUTPATIENT
Start: 2024-05-10 | End: 2024-05-11 | Stop reason: HOSPADM

## 2024-05-10 RX ORDER — POTASSIUM CHLORIDE 7.45 MG/ML
10 INJECTION INTRAVENOUS
Qty: 500 ML | Refills: 0 | Status: COMPLETED | OUTPATIENT
Start: 2024-05-10 | End: 2024-05-10

## 2024-05-10 RX ORDER — NITROGLYCERIN 0.4 MG/1
0.4 TABLET SUBLINGUAL
Status: DISCONTINUED | OUTPATIENT
Start: 2024-05-10 | End: 2024-05-11 | Stop reason: HOSPADM

## 2024-05-10 RX ORDER — ONDANSETRON 2 MG/ML
4 INJECTION INTRAMUSCULAR; INTRAVENOUS ONCE
Status: COMPLETED | OUTPATIENT
Start: 2024-05-10 | End: 2024-05-10

## 2024-05-10 RX ORDER — ALUMINA, MAGNESIA, AND SIMETHICONE 2400; 2400; 240 MG/30ML; MG/30ML; MG/30ML
30 SUSPENSION ORAL ONCE
Status: COMPLETED | OUTPATIENT
Start: 2024-05-11 | End: 2024-05-10

## 2024-05-10 RX ORDER — LAMOTRIGINE 100 MG/1
200 TABLET ORAL 2 TIMES DAILY
Status: DISCONTINUED | OUTPATIENT
Start: 2024-05-10 | End: 2024-05-11 | Stop reason: HOSPADM

## 2024-05-10 RX ORDER — SODIUM CHLORIDE 0.9 % (FLUSH) 0.9 %
10 SYRINGE (ML) INJECTION EVERY 12 HOURS SCHEDULED
Status: DISCONTINUED | OUTPATIENT
Start: 2024-05-10 | End: 2024-05-11 | Stop reason: HOSPADM

## 2024-05-10 RX ORDER — SODIUM CHLORIDE 9 MG/ML
40 INJECTION, SOLUTION INTRAVENOUS AS NEEDED
Status: DISCONTINUED | OUTPATIENT
Start: 2024-05-10 | End: 2024-05-11 | Stop reason: HOSPADM

## 2024-05-10 RX ORDER — SODIUM CHLORIDE 0.9 % (FLUSH) 0.9 %
10 SYRINGE (ML) INJECTION AS NEEDED
Status: DISCONTINUED | OUTPATIENT
Start: 2024-05-10 | End: 2024-05-11 | Stop reason: HOSPADM

## 2024-05-10 RX ADMIN — POTASSIUM CHLORIDE 10 MEQ: 7.46 INJECTION, SOLUTION INTRAVENOUS at 08:53

## 2024-05-10 RX ADMIN — ONDANSETRON 4 MG: 2 INJECTION INTRAMUSCULAR; INTRAVENOUS at 01:13

## 2024-05-10 RX ADMIN — POTASSIUM CHLORIDE 10 MEQ: 7.46 INJECTION, SOLUTION INTRAVENOUS at 14:10

## 2024-05-10 RX ADMIN — POTASSIUM CHLORIDE 10 MEQ: 7.46 INJECTION, SOLUTION INTRAVENOUS at 10:50

## 2024-05-10 RX ADMIN — Medication 10 ML: at 08:54

## 2024-05-10 RX ADMIN — Medication 10 ML: at 20:38

## 2024-05-10 RX ADMIN — LAMOTRIGINE 200 MG: 100 TABLET ORAL at 08:53

## 2024-05-10 RX ADMIN — SULFUR HEXAFLUORIDE 2 ML: KIT at 10:03

## 2024-05-10 RX ADMIN — POTASSIUM CHLORIDE 10 MEQ: 7.46 INJECTION, SOLUTION INTRAVENOUS at 13:04

## 2024-05-10 RX ADMIN — LEVOTHYROXINE SODIUM 137 MCG: 112 TABLET ORAL at 08:53

## 2024-05-10 RX ADMIN — ALUMINUM HYDROXIDE, MAGNESIUM HYDROXIDE, AND DIMETHICONE 30 ML: 400; 400; 40 SUSPENSION ORAL at 23:52

## 2024-05-10 RX ADMIN — SODIUM CHLORIDE 1000 ML: 9 INJECTION, SOLUTION INTRAVENOUS at 02:24

## 2024-05-10 RX ADMIN — POTASSIUM CHLORIDE 10 MEQ: 7.46 INJECTION, SOLUTION INTRAVENOUS at 11:56

## 2024-05-10 RX ADMIN — HEPARIN SODIUM 5000 UNITS: 5000 INJECTION INTRAVENOUS; SUBCUTANEOUS at 13:04

## 2024-05-10 RX ADMIN — LAMOTRIGINE 200 MG: 100 TABLET ORAL at 20:38

## 2024-05-10 RX ADMIN — SODIUM CHLORIDE 1000 ML: 9 INJECTION, SOLUTION INTRAVENOUS at 01:10

## 2024-05-10 NOTE — PLAN OF CARE
Goal Outcome Evaluation:  Plan of Care Reviewed With: patient        Progress: no change  Outcome Evaluation: Patient resting in bed, no c/o pain or discomfort, patient alert and oriented, call light within reach.

## 2024-05-10 NOTE — ED PROVIDER NOTES
Time: 11:07 PM EDT  Date of encounter:  5/9/2024  Independent Historian/Clinical History and Information was obtained by:   Patient    History is limited by: N/A    Chief Complaint   Patient presents with    Dizziness         History of Present Illness:  Patient is a 33 y.o. year old female who presents to the emergency department for evaluation of dizziness.  Patient presents to the emergency department today with persistent dizziness for the past 2 weeks that is worse with movement.  Patient states that she keeps having recurrence of tunnel vision.  Patient states she was seen here in the ER recently for the same complaint and was told that she is dehydrated but her symptoms have not improved.  Patient was also recently evaluated by neurosurgery.  Patient states that she has associated shortness of air and chest pain.  Patient denies vomiting and diarrhea.  Patient denies fever.  Patient had CT chest completed on 4- that was negative for any findings.  (Provider in triage, Paulo Benítez PA-C)    Patient reports that she had gastric sleeve surgery 2 weeks ago.  Since then she has had issues with dizziness, headache, nausea.  Was seen here several days ago and given IV fluids.  Patient states since then dizziness, headache and nausea have worsened.  Patient states symptoms are worse with movement.  Patient also has chest pain and shortness of breath.  Patient denies vomiting, fever/chills, urinary frequency or painful urination.  DARINEL Meracdo    Patient Care Team  Primary Care Provider: Dionna Valenzuela APRN    Past Medical History:     Allergies   Allergen Reactions    Buspirone Mental Status Change     Suicidal ideation    Remeron [Mirtazapine] Other (See Comments)     Suicidal ideation    Mushroom GI Intolerance     Nausea/vomiting     Past Medical History:   Diagnosis Date    Anemia april 2023    iron defiency anemia    Anxiety     Arthritis     Back pain     Bipolar 2 disorder     Bladder problem      Cholelithiasis     Removed 2011    Cluster headache     Condition not found     HERNIATED DISC (UNSPECIFIED)     Cyst of skin     Depression     Difficulty walking     Foot pain, left 2019    Headache, tension-type 2005    Heel pain     Hidradenitis suppurativa     on med    HL (hearing loss)     Hx of blood diseases     Hx of degenerative disc disease     Hyperlipidemia     Hypertension     bloop pressure elevates when with migraines    Hypothyroidism     Ingrown toenail     Irritable bowel syndrome     Leg pain     Leg swelling     Lumbago     Memory loss     Migraines     Mood disorder     Muscle cramps     Neuromuscular disorder     Numbness in feet     Obesity     Thyroid disease     Visual impairment      Past Surgical History:   Procedure Laterality Date    ABDOMINAL SURGERY       SECTION      , ,     CHOLECYSTECTOMY      DILATION AND CURETTAGE, DIAGNOSTIC / THERAPEUTIC      ENDOSCOPY      GASTRIC SLEEVE LAPAROSCOPIC N/A 2024    Procedure: Sleeve gastrectomy LAPAROSCOPIC WITH PARAESPHAGEAL HERNIA REPAIR;  Surgeon: Steve Ferraro Jr., MD;  Location: [facility] OR [facility] [place];  Service: Bariatric;  Laterality: N/A;    HERNIA REPAIR      WISDOM TOOTH EXTRACTION       Family History   Problem Relation Age of Onset    Diabetes Mother     Hypertension Mother     Vision loss Mother     Thyroid disease Mother     Obesity Mother     Migraines Mother     Cancer Father         Passed     Early death Father         cancer    Thyroid disease Father     Arthritis Sister     Obesity Maternal Aunt     Obesity Maternal Uncle     Arthritis Paternal Grandmother     Cancer Paternal Grandmother     Cancer Paternal Grandfather     COPD Paternal Grandfather     Malig Hyperthermia Neg Hx        Home Medications:  Prior to Admission medications    Medication Sig Start Date End Date Taking? Authorizing Provider   baclofen (LIORESAL) 10 MG tablet Take 1 tablet 3 times a  day by oral route as needed for 30 days.    Violetta Xiong MD   cloNIDine (CATAPRES) 0.1 MG tablet Take 1 tablet by mouth Every Night. 3/22/24   Violetta Xiong MD   dicyclomine (BENTYL) 10 MG capsule TAKE ONE CAPSULE BY MOUTH FOUR TIMES A DAY BEFORE MEALS AND AT BEDTIME 12/14/23   Dionna Valenzuela APRN   Doxepin HCl 3 MG tablet Take 3 mg by mouth Every Night.    Violetta Xiong MD   folic acid (FOLVITE) 1 MG tablet Take 1 tablet by mouth Daily. 1/22/24   Dionna Valenzuela APRN   hydroCHLOROthiazide 12.5 MG tablet Take 1 tablet by mouth Daily.    Violetta Xiong MD   HYDROcodone-acetaminophen (NORCO) 7.5-325 MG per tablet Take 1 tablet by mouth Every Morning. 6/2/23   Violetta Xiong MD   hydrOXYzine pamoate (VISTARIL) 50 MG capsule Take 1 capsule by mouth 3 (Three) Times a Day As Needed. 7/10/23   Violetta Xiong MD   lamoTRIgine (LaMICtal) 200 MG tablet Take 1 tablet by mouth 2 (Two) Times a Day. 10/3/23   Dionna Valenzuela APRN   levothyroxine (Synthroid) 137 MCG tablet Take 1 tablet by mouth Every Morning.    Violetta Xiong MD   lidocaine (XYLOCAINE) 2 % jelly Apply  topically to the appropriate area as directed As Needed for Mild Pain. 7/6/23   Viviana Rg APRN   lisinopril (PRINIVIL,ZESTRIL) 5 MG tablet TAKE ONE TABLET BY MOUTH ONCE DAILY 2/27/24   Dionna Valenzuela APRN   metFORMIN ER (GLUCOPHAGE-XR) 500 MG 24 hr tablet TAKE ONE TABLET BY MOUTH ONCE DAILY WITH BREAKFAST 2/27/24   Dionna Valenzuela APRN   mupirocin (BACTROBAN) 2 % cream Apply 1 Application topically to the appropriate area as directed 3 (Three) Times a Day. 3/13/24   Dionna Valenzuela APRN   ondansetron ODT (ZOFRAN-ODT) 4 MG disintegrating tablet Place 1 tablet on the tongue Every 4 (Four) Hours As Needed for Nausea or Vomiting. 4/25/24   Pal, Shahana K, APRN   spironolactone (ALDACTONE) 25 MG tablet TAKE ONE TABLET BY MOUTH ONCE DAILY 2/27/24   Dionna Valenzuela, APRN  "  topiramate (TOPAMAX) 50 MG tablet Take 1/2 tablet daily x 1 week, 1/2 tablet twice a day second week, 1/2 tablet in the morning and 1 tablet in the evening third week and then 1 tablet twice a day thereafter. 24   Satinder Palmer MD        Social History:   Social History     Tobacco Use    Smoking status: Former     Current packs/day: 0.00     Average packs/day: 0.5 packs/day for 16.0 years (8.0 ttl pk-yrs)     Types: Cigarettes     Start date: 2005     Quit date: 2021     Years since quittin.8     Passive exposure: Past    Smokeless tobacco: Never    Tobacco comments:     VAPES EVERYDAY   smoked 1/2 pack a day quit    had smoked for 16 years   Vaping Use    Vaping status: Every Day    Start date: 7/15/2021    Substances: Nicotine, Flavoring    Devices: Disposable    Passive vaping exposure: Yes   Substance Use Topics    Alcohol use: Not Currently     Comment: CURRENT SOME DAY (rare)    Drug use: Yes     Types: Marijuana     Comment: 1/2 gram daily last was 2-3 weeks ago         Review of Systems:  Review of Systems   Respiratory:  Positive for shortness of breath.    Cardiovascular:  Positive for chest pain.   Neurological:  Positive for dizziness.        Physical Exam:  /81 (BP Location: Right arm, Patient Position: Sitting)   Pulse 87   Temp 97.7 °F (36.5 °C) (Oral)   Resp 16   Ht 185.4 cm (73\")   Wt 129 kg (283 lb 4.7 oz)   LMP 2024 (Exact Date)   SpO2 94%   BMI 37.38 kg/m²         Physical Exam  HENT:      Head: Normocephalic.      Mouth/Throat:      Mouth: Mucous membranes are moist.   Eyes:      Pupils: Pupils are equal, round, and reactive to light.   Pulmonary:      Effort: Pulmonary effort is normal.   Abdominal:      General: There is no distension.   Musculoskeletal:      Cervical back: Neck supple.   Skin:     General: Skin is warm and dry.   Neurological:      General: No focal deficit present.      Mental Status: She is alert and oriented to " person, place, and time.   Psychiatric:         Mood and Affect: Mood normal.         Behavior: Behavior normal.                      Procedures:  Procedures      Medical Decision Making:      Comorbidities that affect care:    PCOS, Hypertension, Thyroid Disease, Obesity    External Notes reviewed:          The following orders were placed and all results were independently analyzed by me:  Orders Placed This Encounter   Procedures    COVID-19, FLU A/B, RSV PCR 1 HR TAT - Swab, Nasopharynx    XR Chest 1 View    [place] Draw    Comprehensive Metabolic Panel    Single High Sensitivity Troponin T    Magnesium    Urinalysis With Microscopic If Indicated (No Culture) - Urine, Clean Catch    CBC Auto Differential    Urinalysis, Microscopic Only - Urine, Clean Catch    Phosphorus    Magnesium    Comprehensive Metabolic Panel    TSH    T4, Free    CBC Auto Differential    NPO Diet NPO Type: Strict NPO    Undress & Gown    Continuous Pulse Oximetry    Vital Signs    Orthostatic Blood Pressure    Vital Signs    Intake & Output    Weigh Patient    Oral Care    Maintain IV Access    Telemetry - Place Orders & Notify Provider of Results When Patient Experiences Acute Chest Pain, Dysrhythmia or Respiratory Distress    Orthostatic Blood Pressure    Code Status and Medical Interventions:    Hospitalist (on-call MD unless specified)    OT Consult: Eval & Treat Discharge Placement Assessment    PT Consult: Eval & Treat Functional Mobility Below Baseline    Oxygen Therapy- Nasal Cannula; Titrate 1-6 LPM Per SpO2; 90 - 95%    POC Glucose Once    ECG 12 Lead ED Triage Standing Order; Weak / Dizzy / AMS    Insert Peripheral IV    Insert Peripheral IV    Initiate Observation Status    Fall Precautions    Fall Precautions    CBC & Differential    Green Top (Gel)    Lavender Top    Gold Top - SST    Light Blue Top    CBC & Differential       Medications Given in the Emergency Department:  Medications   sodium chloride 0.9 % flush 10 mL  (has no administration in time range)   sodium chloride 0.9 % flush 10 mL (has no administration in time range)   sodium chloride 0.9 % flush 10 mL (has no administration in time range)   sodium chloride 0.9 % infusion 40 mL (has no administration in time range)   heparin (porcine) 5000 UNIT/ML injection 5,000 Units (has no administration in time range)   nitroglycerin (NITROSTAT) SL tablet 0.4 mg (has no administration in time range)   ondansetron (ZOFRAN) injection 4 mg (4 mg Intravenous Given 5/10/24 0113)   sodium chloride 0.9 % bolus 1,000 mL (0 mL Intravenous Stopped 5/10/24 0136)   sodium chloride 0.9 % bolus 1,000 mL (1,000 mL Intravenous New Bag 5/10/24 0224)        ED Course:    The patient was initially evaluated in the triage area where orders were placed. The patient was later dispositioned by DARINEL Vargas.      The patient was advised to stay for completion of workup which includes but is not limited to communication of labs and radiological results, reassessment and plan. The patient was advised that leaving prior to disposition by a provider could result in critical findings that are not communicated to the patient.     ED Course as of 05/10/24 0518   Thu May 09, 2024   2309 PROVIDER IN TRIAGE  Patient was evaluated by me in triage, Paulo Benítez PA-C.  Orders were placed and patient is currently awaiting final results and disposition.  [MD]   Fri May 10, 2024   0014 ECG 12 Lead ED Triage Standing Order; Weak / Dizzy / AMS  Sinus rhythm, rate 77, borderline prolonged QT [CW]   0216 Patient was ambulated by the nurse, nurse reports the patient became very dizzy and lightheaded, worsening headache and nausea. [CW]   0252 Discussed plan for admission with the patient and she is agreeable. [CW]      ED Course User Index  [CW] Mirna Diaz APRN  [MD] Paulo Benítez PA-C       Labs:    Lab Results (last 24 hours)       Procedure Component Value Units Date/Time    CBC & Differential  [[id number]]  (Abnormal) Collected: 05/09/24 2317    Specimen: Blood Updated: 05/09/24 2322    Narrative:      The following orders were created for panel order CBC & Differential.  Procedure                               Abnormality         Status                     ---------                               -----------         ------                     CBC Auto Differential[[id number]]        Abnormal            Final result                 Please view results for these tests on the individual orders.    Comprehensive Metabolic Panel [[id number]]  (Abnormal) Collected: 05/09/24 2317    Specimen: Blood Updated: 05/09/24 2344     Glucose 102 mg/dL      BUN 9 mg/dL      Creatinine 0.86 mg/dL      Sodium 129 mmol/L      Potassium 3.8 mmol/L      Chloride 89 mmol/L      CO2 23.4 mmol/L      Calcium 9.7 mg/dL      Total Protein 7.1 g/dL      Albumin 4.1 g/dL      ALT (SGPT) 19 U/L      AST (SGOT) 13 U/L      Alkaline Phosphatase 54 U/L      Total Bilirubin 0.4 mg/dL      Globulin 3.0 gm/dL      A/G Ratio 1.4 g/dL      BUN/Creatinine Ratio 10.5     Anion Gap 16.6 mmol/L      eGFR 91.6 mL/min/1.73     Narrative:      GFR Normal >60  Chronic Kidney Disease <60  Kidney Failure <15      Single High Sensitivity Troponin T [[id number]]  (Normal) Collected: 05/09/24 2317    Specimen: Blood Updated: 05/09/24 2344     HS Troponin T <6 ng/L     Narrative:      High Sensitive Troponin T Reference Range:  <14.0 ng/L- Negative Female for AMI  <22.0 ng/L- Negative Male for AMI  >=14 - Abnormal Female indicating possible myocardial injury.  >=22 - Abnormal Male indicating possible myocardial injury.   Clinicians would have to utilize clinical acumen, EKG, Troponin, and serial changes to determine if it is an Acute Myocardial Infarction or myocardial injury due to an underlying chronic condition.         Magnesium [[id number]]  (Normal) Collected: 05/09/24 2317    Specimen: Blood Updated: 05/09/24 2344     Magnesium 1.7 mg/dL     CBC Auto  Differential [[id number]]  (Abnormal) Collected: 05/09/24 2317    Specimen: Blood Updated: 05/09/24 2322     WBC 10.92 10*3/mm3      RBC 4.84 10*6/mm3      Hemoglobin 15.3 g/dL      Hematocrit 41.9 %      MCV 86.6 fL      MCH 31.6 pg      MCHC 36.5 g/dL      RDW 11.9 %      RDW-SD 37.6 fl      MPV 9.9 fL      Platelets 338 10*3/mm3      Neutrophil % 67.7 %      Lymphocyte % 21.1 %      Monocyte % 9.3 %      Eosinophil % 0.6 %      Basophil % 1.0 %      Immature Grans % 0.3 %      Neutrophils, Absolute 7.39 10*3/mm3      Lymphocytes, Absolute 2.30 10*3/mm3      Monocytes, Absolute 1.02 10*3/mm3      Eosinophils, Absolute 0.07 10*3/mm3      Basophils, Absolute 0.11 10*3/mm3      Immature Grans, Absolute 0.03 10*3/mm3      nRBC 0.0 /100 WBC     TSH [[id number]]  (Abnormal) Collected: 05/09/24 2317    Specimen: Blood Updated: 05/10/24 0451     TSH 4.380 uIU/mL     T4, Free [[id number]]  (Abnormal) Collected: 05/09/24 2317    Specimen: Blood Updated: 05/10/24 0451     Free T4 1.95 ng/dL     COVID-19, FLU A/B, RSV PCR 1 HR TAT - Swab, Nasopharynx [[id number]]  (Normal) Collected: 05/09/24 2318    Specimen: Swab from Nasopharynx Updated: 05/10/24 0008     COVID19 Not Detected     Influenza A PCR Not Detected     Influenza B PCR Not Detected     RSV, PCR Not Detected    Narrative:      Fact sheet for providers: https://www.fda.gov/media/991437/download    Fact sheet for patients: https://www.fda.gov/media/379626/download    Test performed by PCR.    Urinalysis With Microscopic If Indicated (No Culture) - Urine, Clean Catch [[id number]]  (Abnormal) Collected: 05/10/24 0113    Specimen: Urine, Clean Catch Updated: 05/10/24 0141     Color, UA Yellow     Appearance, UA Clear     pH, UA 6.5     Specific Gravity, UA 1.020     Glucose, UA Negative     Ketones, UA >=160 mg/dL (4+)     Bilirubin, UA Negative     Blood, UA Small (1+)     Protein, UA Trace     Leuk Esterase, UA Negative     Nitrite, UA Negative     Urobilinogen, UA 1.0  E.U./dL    Urinalysis, Microscopic Only - Urine, Clean Catch [[id number]]  (Abnormal) Collected: 05/10/24 0113    Specimen: Urine, Clean Catch Updated: 05/10/24 0144     RBC, UA 11-20 /HPF      WBC, UA 3-5 /HPF      Bacteria, UA None Seen /HPF      Squamous Epithelial Cells, UA 0-2 /HPF      Hyaline Casts, UA 3-6 /LPF      Methodology Automated Microscopy             Imaging:    XR Chest 1 View    Result Date: 5/9/2024  XR CHEST 1 VW-  Date of Exam: 5/9/2024, 11:17 P.M.  Indication: Weak/Dizzy/AMS triage protocol . Irregular heart rate; dizziness.  Comparison: 5/3/2024.  FINDINGS: A single AP (or PA) upright portable chest radiograph was performed. No cardiac enlargement is seen. The cardiothoracic ratio is 0.3, which is abnormal, suggesting microcardia. Please correlate clinically. No acute infiltrate is appreciated. No pleural effusion or pneumothorax is identified. No significant interval change is seen since the prior study (or studies).  CONCLUSION: No acute infiltrate is appreciated. No cardiac enlargement. Microcardia is suggested.    Please note that portions of this note were completed with a voice recognition program.     Electronically Signed By-Jonnie Campos MD On:5/9/2024 11:41 PM         Differential Diagnosis and Discussion:      Dizziness: Based on the patient's history, signs, and symptoms, the diffential diagnosis includes but is not limited to meningitis, stroke, sepsis, subarachnoid hemorrhage, intracranial bleeding, encephalitis, vertigo, electrolyte imbalance, and metabolic disorders.        MDM  Number of Diagnoses or Management Options  Dizziness  Hyponatremia  Nausea  Nonintractable headache, unspecified chronicity pattern, unspecified headache type  Diagnosis management comments: Patient presented with persistent dizziness, headache and worsening nausea.  Appears to be experiencing worsening hyponatremia despite adequate oral intake, sodium today 129.  Case discussed with Dr Guillen,  hospitalist, who agrees to admit the patient.  Vital signs stable at time of admission.       Amount and/or Complexity of Data Reviewed  Clinical lab tests: reviewed and ordered  Tests in the radiology section of CPT®: reviewed and ordered  Tests in the medicine section of CPT®: reviewed and ordered  Decide to obtain previous medical records or to obtain history from someone other than the patient: yes    Risk of Complications, Morbidity, and/or Mortality  Presenting problems: moderate  Diagnostic procedures: moderate  Management options: moderate    Patient Progress  Patient progress: stable                 Patient Care Considerations:    SEPSIS was considered but is NOT present in the emergency department as SIRS criteria is not present.      Consultants/Shared Management Plan:    Hospitalist: I have discussed the case with Dr. Guillen who agrees to accept the patient for admission.    Social Determinants of Health:    Patient is independent, reliable, and has access to care.       Disposition and Care Coordination:    Admit:   Through independent evaluation of the patient's history, physical, and imperical data, the patient meets criteria for inpatient admission to the hospital.        Final diagnoses:   Hyponatremia   Dizziness   Nausea   Nonintractable headache, unspecified chronicity pattern, unspecified headache type        ED Disposition       ED Disposition   Decision to Admit    Condition   --    Comment   Level of Care: Remote Telemetry [26]   Diagnosis: Hyponatremia [[id number]]                 This medical record created using voice recognition software.             Mirna Diaz, DARINEL  05/10/24 0518

## 2024-05-10 NOTE — ED NOTES
Ambulated pt a 100 feet and patient stated she was feeling dizzy. Pt stated the feeling starts in her chest and then shoots up to her head and makes her dizzy and then patient states her heart rate goes up. APRN notified.

## 2024-05-10 NOTE — PLAN OF CARE
Problem: Electrolyte Imbalance  Goal: Electrolyte Balance  Outcome: Ongoing, Progressing     Problem: Fall Injury Risk  Goal: Absence of Fall and Fall-Related Injury  Outcome: Ongoing, Progressing  Intervention: Promote Injury-Free Environment  Recent Flowsheet Documentation  Taken 5/10/2024 1727 by Michelle Wylie RN  Safety Promotion/Fall Prevention:   safety round/check completed   clutter free environment maintained   assistive device/personal items within reach   fall prevention program maintained  Taken 5/10/2024 1514 by Michelle Wylie RN  Safety Promotion/Fall Prevention:   safety round/check completed   clutter free environment maintained   assistive device/personal items within reach   fall prevention program maintained  Taken 5/10/2024 1357 by Michelle Wylie RN  Safety Promotion/Fall Prevention:   safety round/check completed   assistive device/personal items within reach   clutter free environment maintained   fall prevention program maintained  Taken 5/10/2024 0942 by Michelle Wylie RN  Safety Promotion/Fall Prevention:   safety round/check completed   clutter free environment maintained   assistive device/personal items within reach   fall prevention program maintained  Taken 5/10/2024 0721 by Michelle Wylie RN  Safety Promotion/Fall Prevention:   safety round/check completed   clutter free environment maintained   assistive device/personal items within reach   fall prevention program maintained     Problem: Pain Acute  Goal: Acceptable Pain Control and Functional Ability  Outcome: Ongoing, Progressing     Problem: Adult Inpatient Plan of Care  Goal: Plan of Care Review  Outcome: Ongoing, Progressing  Flowsheets (Taken 5/10/2024 1816)  Progress: improving  Plan of Care Reviewed With: patient  Outcome Evaluation: VSS. pt rested well during shift. no new concerns at this time.  Goal: Patient-Specific Goal (Individualized)  Outcome: Ongoing, Progressing  Goal: Absence of  Hospital-Acquired Illness or Injury  Outcome: Ongoing, Progressing  Intervention: Identify and Manage Fall Risk  Recent Flowsheet Documentation  Taken 5/10/2024 1727 by Michelle Wylie RN  Safety Promotion/Fall Prevention:   safety round/check completed   clutter free environment maintained   assistive device/personal items within reach   fall prevention program maintained  Taken 5/10/2024 1514 by Michelle Wylie RN  Safety Promotion/Fall Prevention:   safety round/check completed   clutter free environment maintained   assistive device/personal items within reach   fall prevention program maintained  Taken 5/10/2024 1357 by Michelle Wylie RN  Safety Promotion/Fall Prevention:   safety round/check completed   assistive device/personal items within reach   clutter free environment maintained   fall prevention program maintained  Taken 5/10/2024 0942 by Michelle Wylie RN  Safety Promotion/Fall Prevention:   safety round/check completed   clutter free environment maintained   assistive device/personal items within reach   fall prevention program maintained  Taken 5/10/2024 0721 by Michelle Wylie RN  Safety Promotion/Fall Prevention:   safety round/check completed   clutter free environment maintained   assistive device/personal items within reach   fall prevention program maintained  Intervention: Prevent and Manage VTE (Venous Thromboembolism) Risk  Recent Flowsheet Documentation  Taken 5/10/2024 0721 by Michelle Wylie RN  Range of Motion: active ROM (range of motion) encouraged  Intervention: Prevent Infection  Recent Flowsheet Documentation  Taken 5/10/2024 1727 by Michelle Wylie RN  Infection Prevention:   rest/sleep promoted   personal protective equipment utilized   hand hygiene promoted   equipment surfaces disinfected  Taken 5/10/2024 1514 by Michelle Wylie RN  Infection Prevention:   rest/sleep promoted   personal protective equipment utilized   hand hygiene promoted    equipment surfaces disinfected  Taken 5/10/2024 1357 by Michelle Wylie RN  Infection Prevention:   rest/sleep promoted   personal protective equipment utilized   hand hygiene promoted   equipment surfaces disinfected  Taken 5/10/2024 0942 by Michelle Wylie RN  Infection Prevention:   rest/sleep promoted   personal protective equipment utilized   hand hygiene promoted   equipment surfaces disinfected  Taken 5/10/2024 0721 by Michelle Wylie RN  Infection Prevention:   rest/sleep promoted   personal protective equipment utilized   hand hygiene promoted   equipment surfaces disinfected  Goal: Optimal Comfort and Wellbeing  Outcome: Ongoing, Progressing  Goal: Readiness for Transition of Care  Outcome: Ongoing, Progressing     Problem: Diabetes Comorbidity  Goal: Blood Glucose Level Within Targeted Range  Outcome: Ongoing, Progressing     Problem: Hypertension Comorbidity  Goal: Blood Pressure in Desired Range  Outcome: Ongoing, Progressing     Problem: Pain Chronic (Persistent) (Comorbidity Management)  Goal: Acceptable Pain Control and Functional Ability  Outcome: Ongoing, Progressing   Goal Outcome Evaluation:  Plan of Care Reviewed With: patient        Progress: improving  Outcome Evaluation: VSS. pt rested well during shift. no new concerns at this time.

## 2024-05-10 NOTE — ED TRIAGE NOTES
Pt arrives to ED with complaints of dizziness, blurred vision, and palpitations.. pt states her symptoms come anytime she stands up.. pt states she is nauseous but no vomiting.. symptoms started a week ago

## 2024-05-10 NOTE — ED PROVIDER NOTES
"Patient is 33 y.o. year old female that presents to the ED for evaluation of nausea vomiting.    Physical Exam    ED Course:    /81 (BP Location: Right arm, Patient Position: Sitting)   Pulse 87   Temp 97.7 °F (36.5 °C) (Oral)   Resp 16   Ht 185.4 cm (73\")   Wt 129 kg (283 lb 4.7 oz)   LMP 05/09/2024 (Exact Date)   SpO2 94%   BMI 37.38 kg/m²   Results for orders placed or performed during the hospital encounter of 05/09/24   COVID-19, FLU A/B, RSV PCR 1 HR TAT - Swab, Nasopharynx    Specimen: Nasopharynx; Swab   Result Value Ref Range    COVID19 Not Detected Not Detected - Ref. Range    Influenza A PCR Not Detected Not Detected    Influenza B PCR Not Detected Not Detected    RSV, PCR Not Detected Not Detected   Comprehensive Metabolic Panel    Specimen: Blood   Result Value Ref Range    Glucose 102 (H) 65 - 99 mg/dL    BUN 9 6 - 20 mg/dL    Creatinine 0.86 0.57 - 1.00 mg/dL    Sodium 129 (L) 136 - 145 mmol/L    Potassium 3.8 3.5 - 5.2 mmol/L    Chloride 89 (L) 98 - 107 mmol/L    CO2 23.4 22.0 - 29.0 mmol/L    Calcium 9.7 8.6 - 10.5 mg/dL    Total Protein 7.1 6.0 - 8.5 g/dL    Albumin 4.1 3.5 - 5.2 g/dL    ALT (SGPT) 19 1 - 33 U/L    AST (SGOT) 13 1 - 32 U/L    Alkaline Phosphatase 54 39 - 117 U/L    Total Bilirubin 0.4 0.0 - 1.2 mg/dL    Globulin 3.0 gm/dL    A/G Ratio 1.4 g/dL    BUN/Creatinine Ratio 10.5 7.0 - 25.0    Anion Gap 16.6 (H) 5.0 - 15.0 mmol/L    eGFR 91.6 >60.0 mL/min/1.73   Single High Sensitivity Troponin T    Specimen: Blood   Result Value Ref Range    HS Troponin T <6 <14 ng/L   Magnesium    Specimen: Blood   Result Value Ref Range    Magnesium 1.7 1.6 - 2.6 mg/dL   Urinalysis With Microscopic If Indicated (No Culture) - Urine, Clean Catch    Specimen: Urine, Clean Catch   Result Value Ref Range    Color, UA Yellow Yellow, Straw    Appearance, UA Clear Clear    pH, UA 6.5 5.0 - 8.0    Specific Gravity, UA 1.020 1.005 - 1.030    Glucose, UA Negative Negative    Ketones, UA >=160 mg/dL " (4+) (A) Negative    Bilirubin, UA Negative Negative    Blood, UA Small (1+) (A) Negative    Protein, UA Trace (A) Negative    Leuk Esterase, UA Negative Negative    Nitrite, UA Negative Negative    Urobilinogen, UA 1.0 E.U./dL 0.2 - 1.0 E.U./dL   CBC Auto Differential    Specimen: Blood   Result Value Ref Range    WBC 10.92 (H) 3.40 - 10.80 10*3/mm3    RBC 4.84 3.77 - 5.28 10*6/mm3    Hemoglobin 15.3 12.0 - 15.9 g/dL    Hematocrit 41.9 34.0 - 46.6 %    MCV 86.6 79.0 - 97.0 fL    MCH 31.6 26.6 - 33.0 pg    MCHC 36.5 (H) 31.5 - 35.7 g/dL    RDW 11.9 (L) 12.3 - 15.4 %    RDW-SD 37.6 37.0 - 54.0 fl    MPV 9.9 6.0 - 12.0 fL    Platelets 338 140 - 450 10*3/mm3    Neutrophil % 67.7 42.7 - 76.0 %    Lymphocyte % 21.1 19.6 - 45.3 %    Monocyte % 9.3 5.0 - 12.0 %    Eosinophil % 0.6 0.3 - 6.2 %    Basophil % 1.0 0.0 - 1.5 %    Immature Grans % 0.3 0.0 - 0.5 %    Neutrophils, Absolute 7.39 (H) 1.70 - 7.00 10*3/mm3    Lymphocytes, Absolute 2.30 0.70 - 3.10 10*3/mm3    Monocytes, Absolute 1.02 (H) 0.10 - 0.90 10*3/mm3    Eosinophils, Absolute 0.07 0.00 - 0.40 10*3/mm3    Basophils, Absolute 0.11 0.00 - 0.20 10*3/mm3    Immature Grans, Absolute 0.03 0.00 - 0.05 10*3/mm3    nRBC 0.0 0.0 - 0.2 /100 WBC   Urinalysis, Microscopic Only - Urine, Clean Catch    Specimen: Urine, Clean Catch   Result Value Ref Range    RBC, UA 11-20 (A) None Seen, 0-2 /HPF    WBC, UA 3-5 (A) None Seen, 0-2 /HPF    Bacteria, UA None Seen None Seen /HPF    Squamous Epithelial Cells, UA 0-2 None Seen, 0-2 /HPF    Hyaline Casts, UA 3-6 None Seen /LPF    Methodology Automated Microscopy    Phosphorus    Specimen: Arm, Right; Blood   Result Value Ref Range    Phosphorus 2.5 2.5 - 4.5 mg/dL   Magnesium    Specimen: Arm, Right; Blood   Result Value Ref Range    Magnesium 1.6 1.6 - 2.6 mg/dL   Comprehensive Metabolic Panel    Specimen: Arm, Right; Blood   Result Value Ref Range    Glucose 79 65 - 99 mg/dL    BUN 7 6 - 20 mg/dL    Creatinine 0.74 0.57 - 1.00 mg/dL     Sodium 132 (L) 136 - 145 mmol/L    Potassium 3.2 (L) 3.5 - 5.2 mmol/L    Chloride 96 (L) 98 - 107 mmol/L    CO2 22.2 22.0 - 29.0 mmol/L    Calcium 8.6 8.6 - 10.5 mg/dL    Total Protein 6.0 6.0 - 8.5 g/dL    Albumin 3.7 3.5 - 5.2 g/dL    ALT (SGPT) 15 1 - 33 U/L    AST (SGOT) 11 1 - 32 U/L    Alkaline Phosphatase 45 39 - 117 U/L    Total Bilirubin 0.3 0.0 - 1.2 mg/dL    Globulin 2.3 gm/dL    A/G Ratio 1.6 g/dL    BUN/Creatinine Ratio 9.5 7.0 - 25.0    Anion Gap 13.8 5.0 - 15.0 mmol/L    eGFR 109.7 >60.0 mL/min/1.73   TSH    Specimen: Blood   Result Value Ref Range    TSH 4.380 (H) 0.270 - 4.200 uIU/mL   T4, Free    Specimen: Blood   Result Value Ref Range    Free T4 1.95 (H) 0.92 - 1.68 ng/dL   CBC Auto Differential    Specimen: Arm, Right; Blood   Result Value Ref Range    WBC 8.43 3.40 - 10.80 10*3/mm3    RBC 4.39 3.77 - 5.28 10*6/mm3    Hemoglobin 13.8 12.0 - 15.9 g/dL    Hematocrit 38.8 34.0 - 46.6 %    MCV 88.4 79.0 - 97.0 fL    MCH 31.4 26.6 - 33.0 pg    MCHC 35.6 31.5 - 35.7 g/dL    RDW 12.3 12.3 - 15.4 %    RDW-SD 39.8 37.0 - 54.0 fl    MPV 10.6 6.0 - 12.0 fL    Platelets 270 140 - 450 10*3/mm3    Neutrophil % 61.6 42.7 - 76.0 %    Lymphocyte % 27.8 19.6 - 45.3 %    Monocyte % 8.2 5.0 - 12.0 %    Eosinophil % 1.1 0.3 - 6.2 %    Basophil % 1.1 0.0 - 1.5 %    Immature Grans % 0.2 0.0 - 0.5 %    Neutrophils, Absolute 5.20 1.70 - 7.00 10*3/mm3    Lymphocytes, Absolute 2.34 0.70 - 3.10 10*3/mm3    Monocytes, Absolute 0.69 0.10 - 0.90 10*3/mm3    Eosinophils, Absolute 0.09 0.00 - 0.40 10*3/mm3    Basophils, Absolute 0.09 0.00 - 0.20 10*3/mm3    Immature Grans, Absolute 0.02 0.00 - 0.05 10*3/mm3    nRBC 0.0 0.0 - 0.2 /100 WBC   ECG 12 Lead ED Triage Standing Order; Weak / Dizzy / AMS   Result Value Ref Range    QT Interval 425 ms    QTC Interval 481 ms   Green Top (Gel)   Result Value Ref Range    Extra Tube Hold for add-ons.    Lavender Top   Result Value Ref Range    Extra Tube hold for add-on    Gold Top - SST    Result Value Ref Range    Extra Tube Hold for add-ons.    Light Blue Top   Result Value Ref Range    Extra Tube Hold for add-ons.      Medications   sodium chloride 0.9 % flush 10 mL (has no administration in time range)   sodium chloride 0.9 % flush 10 mL (has no administration in time range)   sodium chloride 0.9 % flush 10 mL (has no administration in time range)   sodium chloride 0.9 % infusion 40 mL (has no administration in time range)   heparin (porcine) 5000 UNIT/ML injection 5,000 Units (5,000 Units Subcutaneous Not Given 5/10/24 0645)   nitroglycerin (NITROSTAT) SL tablet 0.4 mg (has no administration in time range)   ondansetron (ZOFRAN) injection 4 mg (4 mg Intravenous Given 5/10/24 0113)   sodium chloride 0.9 % bolus 1,000 mL (0 mL Intravenous Stopped 5/10/24 0136)   sodium chloride 0.9 % bolus 1,000 mL (1,000 mL Intravenous New Bag 5/10/24 0224)     XR Chest 1 View    Result Date: 5/9/2024  Narrative: XR CHEST 1 VW-  Date of Exam: 5/9/2024, 11:17 P.M.  Indication: Weak/Dizzy/AMS triage protocol . Irregular heart rate; dizziness.  Comparison: 5/3/2024.  FINDINGS: A single AP (or PA) upright portable chest radiograph was performed. No cardiac enlargement is seen. The cardiothoracic ratio is 0.3, which is abnormal, suggesting microcardia. Please correlate clinically. No acute infiltrate is appreciated. No pleural effusion or pneumothorax is identified. No significant interval change is seen since the prior study (or studies).  CONCLUSION: No acute infiltrate is appreciated. No cardiac enlargement. Microcardia is suggested.    Please note that portions of this note were completed with a voice recognition program.     Electronically Signed By-Jonnie Campos MD On:5/9/2024 11:41 PM      CT Chest With Contrast Diagnostic    Result Date: 5/3/2024  Narrative: CT CHEST W CONTRAST DIAGNOSTIC-  Date of Exam: 5/3/2024 9:48 PM  Indication: PE protocol.  Right-sided chest pain with recent surgery.  Comparisons:  5/3/2024; 3/20/2024.  Technique: Axial CT images were obtained of the chest after the uneventful intravenous administration of 46 mL of Isovue-370 contrast agent.  Reconstructed 2D coronal and sagittal images were also obtained. Automated exposure control and iterative construction methods were used. No 3D reformations/reconstructions are provided for review. Pulmonary CTA protocol was utilized.  FINDINGS: LUNGS: No acute infiltrate. No suspicious pulmonary nodules. VASCULATURE: No pulmonary embolism. No coronary artery calcifications. GREGG: No enlarged hilar lymph nodes. MEDIASTINUM: No enlarged mediastinal lymph nodes. CARDIAC: No cardiac enlargement. No pericardial effusion. AORTA: No thoracic aortic aneurysm or dissection. PLEURA: No pleural effusion. No pneumothorax. CHEST WALL: No mass or axillary adenopathy. No subcutaneous emphysema. No focal fluid collection. LIMITED ABDOMEN: No acute findings are seen in the partially imaged upper abdomen. The patient has undergone sleeve gastrectomy, new since the prior exam. BONES: No acute fracture. No aggressive osseous lesion. OTHER: The central tracheobronchial tree is well aerated without filling defect.  CONCLUSION: No pulmonary embolism. No acute infiltrate.    Please note that portions of this note were completed with a voice recognition program.    Electronically Signed By-Jonnie Campos MD On:5/3/2024 10:01 PM      XR Chest 1 View    Result Date: 5/3/2024  Narrative: XR CHEST 1 VW-  Date of Exam: 5/3/2024 8:10 PM  Indication: Weak/Dizzy/AMS triage protocol  Comparison: Prior chest radiographs dating back to 12/1/2019  FINDINGS:  The lungs are well-expanded. The heart and pulmonary vasculature are within normal limits. No pleural effusions are identified. There are no active appearing infiltrates.      Impression: No active disease.   Electronically Signed By-JENN PETER MD On:5/3/2024 8:49 PM       MDM:      I have seen and evaluated this patient and agree  with the nurse practitioner or physician assistant´s documentation and assessment. All charts, labs, and imaging studies were reviewed. Documentation of one or more elements of my assessment included in the medical record. I agree with findings, exam, and plan.    Disposition:   ED Disposition       ED Disposition   Decision to Admit    Condition   --    Comment   Level of Care: Remote Telemetry [26]   Diagnosis: Hyponatremia [258115]                   Clincal Impression: Hyponatremia, intractable nausea and vomiting    Satinder Mathews MD  06:59 EDT  05/10/24         Satinder Mathews MD  05/10/24 0659

## 2024-05-10 NOTE — H&P
[facility]  Date: 5/10/2024   Patient Name: Meghan Lui  : 1990  MRN: [id number]  Primary Care Physician:  Dionna Valenzuela APRN  Date of admission: 2024    Subjective   Subjective     Chief Complaint: Dizziness, tunnel vision, headache, tingling sensation    HPI:    Meghan Lui is a 33 y.o. female with a past medical history of PCOS, hypothyroidism, obesity, hypertension, chronic back pain, chronic knee pain presented to the ED for evaluation of dizziness, headache, nausea that started 2 weeks ago and has been worsening recently.  Patient had a gastric sleeve surgery done 2 weeks ago and her symptoms started since then.  Patient states that every time she stands up patient starts feeling dizzy, experiences tunnel vision and headache and tingling sensation all over the body and symptoms resolved as soon as she sits down.  Patient was here in the ED on 5/3/2024 with similar complaints and has been given IV fluids and discharged.  Patient has been evaluated by outpatient neurology Dr. Palmer and has been diagnosed with migraine and was started on topiramate, patient did not start the topiramate yet..  Patient continues to have the symptoms and has come to the ED for further evaluation.  Currently patient states that she is feeling better after receiving second liter of IV fluids.  After the first liter of IV fluids patient still had some dizziness after standing up in the ED.  Offers no other complaints.  Even denies any fevers, chills, focal weakness, numbness, tingling, ear pain, ear discharge.    In the ED, vital signs temperature 97.9, pulse 92, respiratory 18, blood pressure 111/67 on room air saturating at 100%.  Labs showed normal troponin, sodium 129, chloride 89, normal bicarb, anion gap elevated 16.6, rest of the CMP with no significant findings, WBC 10.92, normal hemoglobin, platelets, urinalysis showed 3-5 WBC, none bacteria, COVID flu  RSV negative.  Chest x-ray showed no acute infiltrate.  Microcardia suggested.  Patient has been admitted for further evaluation and management of moderate hyponatremia, dehydration, dizziness likely orthostatic hypotension    Personal History     Past Medical History:   Diagnosis Date    Anemia 2023    iron defiency anemia    Anxiety     Arthritis     Back pain     Bipolar 2 disorder     Bladder problem     Cholelithiasis     Removed 2011    Cluster headache     Condition not found     HERNIATED DISC (UNSPECIFIED)     Cyst of skin     Depression     Difficulty walking     Foot pain, left 2019    Headache, tension-type 2005    Heel pain     Hidradenitis suppurativa     on med    HL (hearing loss)     Hx of blood diseases     Hx of degenerative disc disease     Hyperlipidemia     Hypertension 2015    bloop pressure elevates when with migraines    Hypothyroidism     Ingrown toenail     Irritable bowel syndrome     Leg pain     Leg swelling     Lumbago     Memory loss     Migraines     Mood disorder     Muscle cramps     Neuromuscular disorder 2018    Numbness in feet     Obesity     Thyroid disease     Visual impairment          Past Surgical History:   Procedure Laterality Date     SECTION      , ,     CHOLECYSTECTOMY      DILATION AND CURETTAGE, DIAGNOSTIC / THERAPEUTIC      ENDOSCOPY      GASTRIC SLEEVE LAPAROSCOPIC N/A 2024    Procedure: Sleeve gastrectomy LAPAROSCOPIC WITH PARAESPHAGEAL HERNIA REPAIR;  Surgeon: Steve Ferraro Jr., MD;  [place] [facility] [place] [facility] [place]  Service: Bariatric;  Laterality: N/A;    WISDOM TOOTH EXTRACTION           Family History   Problem Relation Age of Onset    Diabetes Mother     Hypertension Mother     Vision loss Mother     Thyroid disease Mother     Obesity Mother     Migraines Mother     Cancer Father         Passed     Early death Father         cancer    Thyroid disease Father     Arthritis Sister     Obesity  Maternal Aunt     Obesity Maternal Uncle     Arthritis Paternal Grandmother     Cancer Paternal Grandmother     Cancer Paternal Grandfather     COPD Paternal Grandfather     Malig Hyperthermia Neg Hx          Social History     Socioeconomic History    Marital status: Single   Tobacco Use    Smoking status: Former     Current packs/day: 0.00     Average packs/day: 0.5 packs/day for 16.0 years (8.0 ttl pk-yrs)     Types: Cigarettes     Start date: 2005     Quit date: 2021     Years since quittin.8     Passive exposure: Past    Smokeless tobacco: Never    Tobacco comments:     VAPES EVERYDAY   smoked 1/2 pack a day quit    had smoked for 16 years   Vaping Use    Vaping status: Every Day    Start date: 7/15/2021    Substances: Nicotine, Flavoring    Devices: Disposable    Passive vaping exposure: Yes   Substance and Sexual Activity    Alcohol use: Not Currently     Comment: CURRENT SOME DAY (rare)    Drug use: Yes     Types: Marijuana     Comment: 1/2 gram daily  last was 2-3 weeks ago    Sexual activity: Yes     Partners: Male     Birth control/protection: None         Home Medications:  Doxepin HCl, HYDROcodone-acetaminophen, baclofen, cloNIDine, dicyclomine, folic acid, hydrOXYzine pamoate, hydroCHLOROthiazide, lamoTRIgine, levothyroxine, lidocaine, lisinopril, metFORMIN ER, mupirocin, ondansetron ODT, spironolactone, and topiramate    Allergies:  Allergies   Allergen Reactions    Remeron [Mirtazapine] Other (See Comments)     Suicidal ideation    Mushroom GI Intolerance    Buspirone Mental Status Change     Suicidal ideation       Review of Systems   All other systems reviewed and negative except as mentioned above in the HPI    Objective   Objective     Vitals:   Temp:  [97.7 °F (36.5 °C)-97.9 °F (36.6 °C)] 97.7 °F (36.5 °C)  Heart Rate:  [79-92] 87  Resp:  [16-18] 16  BP: (111-132)/(67-81) 132/81    Physical Exam    Constitutional: Awake, alert, no acute distress   Eyes: Pupils equal, sclerae  anicteric, no conjunctival injection   HENT: NCAT, mucous membranes moist   Respiratory: Clear to auscultation bilaterally, nonlabored respirations    Cardiovascular: RRR, no murmurs, rubs, or gallops, palpable pedal pulses bilaterally   Gastrointestinal: Positive bowel sounds, soft, nontender, nondistended   Musculoskeletal: No bilateral ankle edema, no clubbing or cyanosis to extremities   Neurologic: Oriented x 3, strength 5/5 bilateral upper and lower extremities, cranial nerves II to XII intact, speech clear   Skin: No rashes     Result Review    Result Review:  I have personally reviewed the results from the time of this admission to 5/10/2024 04:23 EDT and agree with these findings:  [x]  Laboratory  []  Microbiology  [x]  Radiology  [x]  EKG/Telemetry   []  Cardiology/Vascular   []  Pathology  []  Old records  []  Other:        Imaging Results (Last 24 Hours)       Procedure Component Value Units Date/Time    XR Chest 1 View [[id number]] Collected: 05/09/24 2333     Updated: 05/09/24 2343    Narrative:      XR CHEST 1 VW-     Date of Exam: 5/9/2024, 11:17 P.M.     Indication: Weak/Dizzy/AMS triage protocol . Irregular heart rate;  dizziness.     Comparison: 5/3/2024.     FINDINGS:   A single AP (or PA) upright portable chest radiograph was performed. No  cardiac enlargement is seen. The cardiothoracic ratio is 0.3, which is  abnormal, suggesting microcardia. Please correlate clinically. No acute  infiltrate is appreciated. No pleural effusion or pneumothorax is  identified. No significant interval change is seen since the prior study  (or studies).     CONCLUSION:   No acute infiltrate is appreciated. No cardiac enlargement. Microcardia  is suggested.           Please note that portions of this note were completed with a voice  recognition program.              Electronically Signed By-Jonnie Campos MD On:5/9/2024 11:41 PM                heparin (porcine), 5,000 Units, Subcutaneous, Q8H  sodium chloride, 10  mL, Intravenous, Q12H         Assessment & Plan   Assessment / Plan     Assessment/Plan:   Moderate hyponatremia likely due to hypovolemia versus SIADH from headache  Dehydration  Dizziness, headache, nausea likely orthostatic hypotension  Recently diagnosed with migraine  Recent gastric sleeve surgery 2 weeks ago  Hypothyroidism  PCOS  Obesity  Hypertension      Plan  Admit to observation, remote telemetry  Received 2 L IV fluids in the ED, check sodium levels with a.m. labs  Consider nephrology consult in the a.m. based on the sodium levels with a.m. labs  Fall precautions  Check orthostatic vitals  PT OT consult  Follow-up on TSH, free T4 levels  Continue topiramate  Resume other appropriate home medications once reconciled  Hold hydrochlorothiazide, spironolactone, resume when appropriate      DVT prophylaxis:  Medical DVT prophylaxis orders are present.    CODE STATUS:    Level Of Support Discussed With: Patient  Code Status (Patient has no pulse and is not breathing): CPR (Attempt to Resuscitate)  Medical Interventions (Patient has pulse or is breathing): Full Support      Admission Status:  I believe this patient meets observation status.    Part of this note may be an electronic transcription/translation of spoken language to printed text using the Dragon Dictation System    Philip Guillen MD

## 2024-05-10 NOTE — THERAPY EVALUATION
Patient Name: Meghan Lui  : 1990    MRN: [id number]                              Today's Date: 5/10/2024       Admit Date: 2024    Visit Dx:     ICD-10-CM ICD-9-CM   1. Hyponatremia  E87.1 276.1   2. Dizziness  R42 780.4   3. Nausea  R11.0 787.02   4. Nonintractable headache, unspecified chronicity pattern, unspecified headache type  R51.9 784.0     Patient Active Problem List   Diagnosis    Chondromalacia, bilateral knee    Intractable chronic migraine without aura and without status migrainosus    Insomnia    DDD (degenerative disc disease), lumbar    Neuropathy    Primary hypertension    Recurrent major depressive disorder, in partial remission    Hashimoto's thyroiditis    Irritable bowel syndrome with both constipation and diarrhea    Mood disorder    Migraines    Lumbar radiculopathy    Hypothyroidism    Arthritis    Ankle edema    Hyperlipemia    Overflow stress urinary incontinence in female    Heavy period    Multiple joint pain    Balance disorder    Depression with anxiety    Bipolar 2 disorder    Hidradenitis    PCOS (polycystic ovarian syndrome)    Witnessed apneic spells    Sleep-related bruxism    Restless legs syndrome (RLS)    Hypersomnia    Suspected sleep apnea    Snoring    Hidradenitis suppurativa of anus    Paraesophageal hernia    Osteoarthritis of patellofemoral joints of both knees    Obesity, Class II, BMI 35-39.9    Drug-induced myoclonus    Hyponatremia     Past Medical History:   Diagnosis Date    Anemia 2023    iron defiency anemia    Anxiety     Arthritis     Back pain     Bipolar 2 disorder     Bladder problem     Cholelithiasis     Removed 2011    Cluster headache     Condition not found     HERNIATED DISC (UNSPECIFIED)     Cyst of skin     Depression     Difficulty walking     Foot pain, left 2019    Headache, tension-type 2005    Heel pain     Hidradenitis suppurativa     on med    HL (hearing loss)     Hx of blood diseases     Hx of  degenerative disc disease     Hyperlipidemia     Hypertension 2015    bloop pressure elevates when with migraines    Hypothyroidism     Ingrown toenail     Irritable bowel syndrome     Leg pain     Leg swelling     Lumbago     Memory loss     Migraines     Mood disorder     Muscle cramps     Neuromuscular disorder 2018    Numbness in feet     Obesity     Thyroid disease     Visual impairment      Past Surgical History:   Procedure Laterality Date    ABDOMINAL SURGERY       SECTION      , , 2017    CHOLECYSTECTOMY  2011    DILATION AND CURETTAGE, DIAGNOSTIC / THERAPEUTIC  2016    ENDOSCOPY      GASTRIC SLEEVE LAPAROSCOPIC N/A 2024    Procedure: Sleeve gastrectomy LAPAROSCOPIC WITH PARAESPHAGEAL HERNIA REPAIR;  Surgeon: Steve Ferraro Jr., MD;  [place] [facility] [place] [facility] [place]  Service: Bariatric;  Laterality: N/A;    HERNIA REPAIR      WISDOM TOOTH EXTRACTION        General Information       Row Name 05/10/24 1054          OT Time and Intention    Document Type evaluation  -PG     Mode of Treatment individual therapy;occupational therapy  -PG       Row Name 05/10/24 1055          General Information    Prior Level of Function independent:;transfer;ADL's  -PG     Existing Precautions/Restrictions no known precautions/restrictions  -PG     Barriers to Rehab none identified  -PG       Row Name 05/10/24 1059          Occupational Profile    Reason for Services/Referral (Occupational Profile) Patient is a 33-year-old female admitted for dizziness, nausea and hyponatremia.  Patient being evaluated by Occupational Therapy due to recent decline in ADL function.  No previous OT services identified  -PG       Row Name 05/10/24 105          Living Environment    People in Home significant other  -PG       Row Name 05/10/24 1050          Cognition    Orientation Status (Cognition) oriented x 4  -PG               User Key  (r) = Recorded By, (t) = Taken By, (c) = Cosigned By      Initials Name Provider  Type    PG Alexey James OT Occupational Therapist                     Mobility/ADL's       Row Name 05/10/24 1055          Transfers    Transfers bed-chair transfer  -PG       Row Name 05/10/24 1055          Bed-Chair Transfer    Bed-Chair [place] (Transfers) independent  -PG       Row Name 05/10/24 1055          Activities of Daily Living    BADL Assessment/Intervention bathing;upper body dressing;lower body dressing;grooming;toileting  -PG       Row Name 05/10/24 1055          Bathing Assessment/Intervention    [place] Level (Bathing) bathing skills;independent  -PG       Row Name 05/10/24 1055          Upper Body Dressing Assessment/Training    [place] Level (Upper Body Dressing) upper body dressing skills;independent  -PG       Row Name 05/10/24 1055          Lower Body Dressing Assessment/Training    [place] Level (Lower Body Dressing) lower body dressing skills;independent  -PG       Row Name 05/10/24 1055          Grooming Assessment/Training    [place] Level (Grooming) grooming skills;independent  -PG       Row Name 05/10/24 1055          Toileting Assessment/Training    [place] Level (Toileting) toileting skills;independent  -PG               User Key  (r) = Recorded By, (t) = Taken By, (c) = Cosigned By      Initials Name Provider Type    PG Alexey James OT Occupational Therapist                   Obj/Interventions       Row Name 05/10/24 1055          Sensory Assessment (Somatosensory)    Sensory Assessment (Somatosensory) sensation intact  -PG       Row Name 05/10/24 1055          Vision Assessment/Intervention    Visual Impairment/Limitations WFL  -PG       Row Name 05/10/24 1056          Range of Motion Comprehensive    General Range of Motion no range of motion deficits identified  -PG       Row Name 05/10/24 1056          Strength Comprehensive (MMT)    General Manual Muscle Testing (MMT) Assessment no strength deficits identified  -PG       Row Name 05/10/24 1056           Motor Skills    Motor Skills coordination;functional endurance  -PG     Coordination WFL  -PG     Functional Endurance Good  -PG               User Key  (r) = Recorded By, (t) = Taken By, (c) = Cosigned By      Initials Name Provider Type    PG Alexey James OT Occupational Therapist                   Goals/Plan    No documentation.                  Clinical Impression       Row Name 05/10/24 1056          Pain Assessment    Pretreatment Pain Rating 0/10 - no pain  -PG     Posttreatment Pain Rating 0/10 - no pain  -PG       Row Name 05/10/24 1056          Plan of Care Review    Plan of Care Reviewed With patient  -PG     Progress improving  -PG     Outcome Evaluation No deficits identified.  Patient currently walking in her room to and from the bathroom and with all self-care activities.  Patient agreeable that no additional occupational therapy services are necessary at this time  -PG       Row Name 05/10/24 1056          Therapy Assessment/Plan (OT)    Criteria for Skilled Therapeutic Interventions Met (OT) no;no problems identified which require skilled intervention  -PG     Therapy Frequency (OT) evaluation only  -PG       Row Name 05/10/24 1056          Therapy Plan Review/Discharge Plan (OT)    Anticipated Discharge Disposition (OT) home  -PG               User Key  (r) = Recorded By, (t) = Taken By, (c) = Cosigned By      Initials Name Provider Type    PG Alexey James OT Occupational Therapist                   Outcome Measures       Row Name 05/10/24 1057          How much help from another is currently needed...    Putting on and taking off regular lower body clothing? 4  -PG     Bathing (including washing, rinsing, and drying) 4  -PG     Toileting (which includes using toilet bed pan or urinal) 4  -PG     Putting on and taking off regular upper body clothing 4  -PG     Taking care of personal grooming (such as brushing teeth) 4  -PG     Eating meals 4  -PG     AM-PAC 6 Clicks Score (OT) 24  -PG        Row Name 05/10/24 0721 05/10/24 0426       How much help from another person do you currently need...    Turning from your back to your side while in flat bed without using bedrails? 4  -KS 4  -TF    Moving from lying on back to sitting on the side of a flat bed without bedrails? 4  -KS 4  -TF    Moving to and from a bed to a chair (including a wheelchair)? 4  -KS 4  -TF    Standing up from a chair using your arms (e.g., wheelchair, bedside chair)? 4  -KS 4  -TF    Climbing 3-5 steps with a railing? 4  -KS 4  -TF    To walk in hospital room? 4  -KS 4  -TF    AM-PAC 6 Clicks Score (PT) 24  -KS 24  -TF    Highest Level of Mobility Goal 8 --> Walked 250 feet or more  -KS 8 --> Walked 250 feet or more  -TF      Row Name 05/10/24 1057          Functional Assessment    Outcome Measure Options AM-PAC 6 Clicks Daily Activity (OT);Optimal Instrument  -PG       Row Name 05/10/24 1057          Optimal Instrument    Optimal Instrument Optimal - 3  -PG     Bending/Stooping 1  -PG     Standing 1  -PG     Reaching 1  -PG     From the list, choose the 3 activities you would most like to be able to do without any difficulty Bending/stooping;Standing;Reaching  -PG     Total Score Optimal - 3 3  -PG               User Key  (r) = Recorded By, (t) = Taken By, (c) = Cosigned By      Initials Name Provider Type    TF Laura Jeffers, RN Registered Nurse    Michelle Nuñez, RN Registered Nurse    Alexey Snider OT Occupational Therapist                      OT Recommendation and Plan  Therapy Frequency (OT): evaluation only  Plan of Care Review  Plan of Care Reviewed With: patient  Progress: improving  Outcome Evaluation: No deficits identified.  Patient currently walking in her room to and from the bathroom and with all self-care activities.  Patient agreeable that no additional occupational therapy services are necessary at this time     Time Calculation:   Evaluation Complexity (OT)  Review Occupational  Profile/Medical/Therapy History Complexity: brief/low complexity  Assessment, Occupational Performance/Identification of Deficit Complexity: 1-3 performance deficits  Clinical Decision Making Complexity (OT): problem focused assessment/low complexity  Overall Complexity of Evaluation (OT): low complexity     Time Calculation- OT       Row Name 05/10/24 1058             Time Calculation- OT    OT Received On 05/10/24  -PG         Untimed Charges    OT Eval/Re-eval Minutes 30  -PG         Total Minutes    Untimed Charges Total Minutes 30  -PG       Total Minutes 30  -PG                User Key  (r) = Recorded By, (t) = Taken By, (c) = Cosigned By      Initials Name Provider Type    PG Alexey James OT Occupational Therapist                  Therapy Charges for Today       Code Description Service Date Service Provider Modifiers Qty    [id number] HC OT EVAL LOW COMPLEXITY 2 5/10/2024 Alexey James OT GO 1                 Alexey James OT  5/10/2024

## 2024-05-11 ENCOUNTER — READMISSION MANAGEMENT (OUTPATIENT)
Dept: CALL CENTER | Facility: HOSPITAL | Age: 34
End: 2024-05-11
Payer: COMMERCIAL

## 2024-05-11 VITALS
OXYGEN SATURATION: 98 % | RESPIRATION RATE: 16 BRPM | WEIGHT: 283.29 LBS | HEART RATE: 109 BPM | SYSTOLIC BLOOD PRESSURE: 122 MMHG | DIASTOLIC BLOOD PRESSURE: 99 MMHG | TEMPERATURE: 98.6 F | BODY MASS INDEX: 38.37 KG/M2 | HEIGHT: 72 IN

## 2024-05-11 LAB
ANION GAP SERPL CALCULATED.3IONS-SCNC: 11.3 MMOL/L (ref 5–15)
BASOPHILS # BLD AUTO: 0.07 10*3/MM3 (ref 0–0.2)
BASOPHILS NFR BLD AUTO: 1.2 % (ref 0–1.5)
BUN SERPL-MCNC: 5 MG/DL (ref 6–20)
BUN/CREAT SERPL: 6.8 (ref 7–25)
CALCIUM SPEC-SCNC: 8.7 MG/DL (ref 8.6–10.5)
CHLORIDE SERPL-SCNC: 99 MMOL/L (ref 98–107)
CO2 SERPL-SCNC: 23.7 MMOL/L (ref 22–29)
CREAT SERPL-MCNC: 0.73 MG/DL (ref 0.57–1)
DEPRECATED RDW RBC AUTO: 40.1 FL (ref 37–54)
EGFRCR SERPLBLD CKD-EPI 2021: 111.5 ML/MIN/1.73
EOSINOPHIL # BLD AUTO: 0.11 10*3/MM3 (ref 0–0.4)
EOSINOPHIL NFR BLD AUTO: 1.9 % (ref 0.3–6.2)
ERYTHROCYTE [DISTWIDTH] IN BLOOD BY AUTOMATED COUNT: 12.3 % (ref 12.3–15.4)
GLUCOSE SERPL-MCNC: 89 MG/DL (ref 65–99)
HCT VFR BLD AUTO: 39.8 % (ref 34–46.6)
HGB BLD-MCNC: 14 G/DL (ref 12–15.9)
IMM GRANULOCYTES # BLD AUTO: 0 10*3/MM3 (ref 0–0.05)
IMM GRANULOCYTES NFR BLD AUTO: 0 % (ref 0–0.5)
LYMPHOCYTES # BLD AUTO: 1.41 10*3/MM3 (ref 0.7–3.1)
LYMPHOCYTES NFR BLD AUTO: 24 % (ref 19.6–45.3)
MAGNESIUM SERPL-MCNC: 2 MG/DL (ref 1.6–2.6)
MCH RBC QN AUTO: 31.3 PG (ref 26.6–33)
MCHC RBC AUTO-ENTMCNC: 35.2 G/DL (ref 31.5–35.7)
MCV RBC AUTO: 88.8 FL (ref 79–97)
MONOCYTES # BLD AUTO: 0.45 10*3/MM3 (ref 0.1–0.9)
MONOCYTES NFR BLD AUTO: 7.7 % (ref 5–12)
NEUTROPHILS NFR BLD AUTO: 3.84 10*3/MM3 (ref 1.7–7)
NEUTROPHILS NFR BLD AUTO: 65.2 % (ref 42.7–76)
NRBC BLD AUTO-RTO: 0 /100 WBC (ref 0–0.2)
PLATELET # BLD AUTO: 237 10*3/MM3 (ref 140–450)
PMV BLD AUTO: 9.7 FL (ref 6–12)
POTASSIUM SERPL-SCNC: 3.5 MMOL/L (ref 3.5–5.2)
QT INTERVAL: 425 MS
QTC INTERVAL: 481 MS
RBC # BLD AUTO: 4.48 10*6/MM3 (ref 3.77–5.28)
SODIUM SERPL-SCNC: 134 MMOL/L (ref 136–145)
WBC NRBC COR # BLD AUTO: 5.88 10*3/MM3 (ref 3.4–10.8)

## 2024-05-11 PROCEDURE — 85025 COMPLETE CBC W/AUTO DIFF WBC: CPT | Performed by: STUDENT IN AN ORGANIZED HEALTH CARE EDUCATION/TRAINING PROGRAM

## 2024-05-11 PROCEDURE — 80048 BASIC METABOLIC PNL TOTAL CA: CPT | Performed by: STUDENT IN AN ORGANIZED HEALTH CARE EDUCATION/TRAINING PROGRAM

## 2024-05-11 PROCEDURE — 83735 ASSAY OF MAGNESIUM: CPT | Performed by: STUDENT IN AN ORGANIZED HEALTH CARE EDUCATION/TRAINING PROGRAM

## 2024-05-11 PROCEDURE — 99239 HOSP IP/OBS DSCHRG MGMT >30: CPT | Performed by: STUDENT IN AN ORGANIZED HEALTH CARE EDUCATION/TRAINING PROGRAM

## 2024-05-11 PROCEDURE — G0378 HOSPITAL OBSERVATION PER HR: HCPCS

## 2024-05-11 RX ORDER — POTASSIUM CHLORIDE 750 MG/1
40 CAPSULE, EXTENDED RELEASE ORAL ONCE
Status: DISCONTINUED | OUTPATIENT
Start: 2024-05-11 | End: 2024-05-11

## 2024-05-11 RX ADMIN — LEVOTHYROXINE SODIUM 137 MCG: 112 TABLET ORAL at 06:44

## 2024-05-11 RX ADMIN — LAMOTRIGINE 200 MG: 100 TABLET ORAL at 08:00

## 2024-05-11 RX ADMIN — Medication 10 ML: at 08:00

## 2024-05-11 NOTE — DISCHARGE SUMMARY
[facility]         HOSPITALIST  DISCHARGE SUMMARY    Patient Name: Meghan Lui  : 1990  MRN: [id number]    Date of Admission: 2024  Date of Discharge:  2024  Primary Care Physician: Dionna Valenzuela APRN    Consults       Date and Time Order Name Status Description    5/10/2024  2:39 AM Hospitalist (on-call MD unless specified)              Active and Resolved Hospital Problems:  Active Hospital Problems    Diagnosis POA    **Hyponatremia [E87.1] Yes      Resolved Hospital Problems   No resolved problems to display.       Hospital Course     Hospital Course:  eMghan Lui is a 33 y.o. female with past medical history of PCOS, hypothyroidism, obesity, hypertension, chronic back pain, chronic knee pain presented to the ED for evaluation of dizziness, headache, nausea that started 2 weeks ago and has been worsening recently.  Patient had a gastric sleeve surgery done 2 weeks ago and her symptoms started since then.  Patient states that every time she stands up patient starts feeling dizzy, experiences tunnel vision and headache and tingling sensation all over the body and symptoms resolved as soon as she sits down.  Patient was here in the ED on 5/3/2024 with similar complaints and has been given IV fluids and discharged.  Patient has been evaluated by outpatient neurology Dr. Palmer and has been diagnosed with migraine and was started on topiramate, patient did not start the topiramate yet..  Patient continues to have the symptoms and has come to the ED for further evaluation.  Currently patient states that she is feeling better after receiving second liter of IV fluids.  After the first liter of IV fluids patient still had some dizziness after standing up in the ED.  Offers no other complaints.  Even denies any fevers, chills, focal weakness, numbness, tingling, ear pain, ear discharge.   On presentation, sodium 129, chloride 89, normal bicarb, anion gap elevated  16.6, rest of the CMP with no significant findings, WBC 10.92, normal hemoglobin, platelets, urinalysis showed 3-5 WBC, none bacteria, COVID flu RSV negative.  Chest x-ray showed no acute infiltrate.  Microcardia suggested.  Patient has been admitted for further evaluation and management of moderate hyponatremia, dehydration, dizziness.  Patient's blood pressure was normal without any for blood pressure medications at home including clonidine, hydrochlorothiazide, lisinopril and spironolactone.  Her symptoms might be due to polypharmacy, multiple antihypertensive medications in the setting of normal blood pressure.  She stated that she recently had gastric sleeve surgery, unaware what could have contributed to her normal blood pressure without any antihypertensive medication.  Patient's symptoms improved and she is stable.Transthoracic echo showed LVEF of 63% with normal diastolic function; mild bileaflet mitral valve prolapse present.    Patient is stable and is being discharged home today.  She will stop taking her antihypertensive medications including clonidine, hydrochlorothiazide, lisinopril and spironolactone.  Advised to monitor blood pressure at home and create a log book tp provide it to her PCP on follow up to help manage her Blood pressure.  Follow-up with PCP in 3-7 days, needs CBC and chemistries trended during follow-up.  Fall precautions.        DISCHARGE Follow Up Recommendations for labs and diagnostics: As mentioned above.      Day of Discharge     Vital Signs:  Temp:  [97.7 °F (36.5 °C)-98.6 °F (37 °C)] 98.6 °F (37 °C)  Heart Rate:  [] 109  Resp:  [16-18] 16  BP: (115-140)/(57-99) 122/99  Physical Exam:   Constitutional: Awake, alert, no acute distress  Respiratory: Clear to auscultation bilaterally, nonlabored respirations   Cardiovascular: RRR, no murmurs, rubs, or gallops, palpable pedal pulses bilaterally  Gastrointestinal: Positive bowel sounds, soft, nontender,  nondistended  Neurologic: ,y, oriented x 3, speech clear              Discharge Details        Discharge Medications        Changes to Medications        Instructions Start Date   lidocaine 2 % jelly  Commonly known as: XYLOCAINE  What changed: reasons to take this   Topical, As Needed      topiramate 50 MG tablet  Commonly known as: TOPAMAX  What changed: additional instructions   Take 1/2 tablet daily x 1 week, 1/2 tablet twice a day second week, 1/2 tablet in the morning and 1 tablet in the evening third week and then 1 tablet twice a day thereafter.             Continue These Medications        Instructions Start Date   baclofen 10 MG tablet  Commonly known as: LIORESAL   Take 1 tablet by mouth 3 (Three) Times a Day As Needed.      dicyclomine 10 MG capsule  Commonly known as: BENTYL   10 mg, Oral, 4 Times Daily Before Meals & Nightly      Doxepin HCl 3 MG tablet   Take 3 mg by mouth Every Night.      folic acid 1 MG tablet  Commonly known as: FOLVITE   1 mg, Oral, Daily      HYDROcodone-acetaminophen 7.5-325 MG per tablet  Commonly known as: NORCO   1 tablet, Oral, 2 Times Daily PRN      hydrOXYzine pamoate 50 MG capsule  Commonly known as: VISTARIL   50 mg, Oral, 3 Times Daily PRN      lamoTRIgine 200 MG tablet  Commonly known as: LaMICtal   200 mg, Oral, 2 Times Daily      metFORMIN  MG 24 hr tablet  Commonly known as: GLUCOPHAGE-XR   500 mg, Oral, Daily With Breakfast      mupirocin 2 % cream  Commonly known as: BACTROBAN   1 Application, Topical, 3 Times Daily      ondansetron ODT 4 MG disintegrating tablet  Commonly known as: ZOFRAN-ODT   4 mg, Translingual, Every 4 Hours PRN      Synthroid 137 MCG tablet  Generic drug: levothyroxine   137 mcg, Oral, Every Early Morning             Stop These Medications      cloNIDine 0.1 MG tablet  Commonly known as: CATAPRES     hydroCHLOROthiazide 12.5 MG tablet     lisinopril 5 MG tablet  Commonly known as: PRINIVIL,ZESTRIL     spironolactone 25 MG  tablet  Commonly known as: ALDACTONE              Allergies   Allergen Reactions    Buspirone Mental Status Change     Suicidal ideation    Remeron [Mirtazapine] Other (See Comments)     Suicidal ideation    Mushroom GI Intolerance     Nausea/vomiting       Discharge Disposition:  Home or Self Care    Diet:  Hospital:  Diet Order   Procedures    Diet: Regular/House; Bariatric Stage 3; Fluid Consistency: Thin (IDDSI 0)       Discharge Activity:       CODE STATUS:  Code Status and Medical Interventions:   Ordered at: 05/10/24 0423     Level Of Support Discussed With:    Patient     Code Status (Patient has no pulse and is not breathing):    CPR (Attempt to Resuscitate)     Medical Interventions (Patient has pulse or is breathing):    Full Support       Future Appointments   Date Time Provider Department Center   5/28/2024 10:00 AM Dionna Valenzuela APRN [facility]   6/4/2024 10:00 AM Radhika Mendoza APRN [facility] None   7/9/2024  9:30 AM Satinder Palmer MD [facility]       Additional Instructions for the Follow-ups that You Need to Schedule       Discharge Follow-up with PCP   As directed       Currently Documented PCP:    Dionna Valenzuela APRN    PCP Phone Number:    [phone]     Follow Up Details: In 3-7 days; needs CBC and chemistries during follow up.                Pertinent  and/or Most Recent Results     PROCEDURES:       LAB RESULTS:      Lab 05/11/24 0949 05/10/24  0532 05/09/24  2317   WBC 5.88 8.43 10.92*   HEMOGLOBIN 14.0 13.8 15.3   HEMATOCRIT 39.8 38.8 41.9   PLATELETS 237 270 338   NEUTROS ABS 3.84 5.20 7.39*   IMMATURE GRANS (ABS) 0.00 0.02 0.03   LYMPHS ABS 1.41 2.34 2.30   MONOS ABS 0.45 0.69 1.02*   EOS ABS 0.11 0.09 0.07   MCV 88.8 88.4 86.6         Lab 05/11/24  0949 05/10/24  0532 05/09/24  2317   SODIUM 134* 132* 129*   POTASSIUM 3.5 3.2* 3.8   CHLORIDE 99 96* 89*   CO2 23.7 22.2 23.4   ANION GAP 11.3 13.8 16.6*   BUN 5* 7 9   CREATININE 0.73 0.74 0.86   EGFR  111.5 109.7 91.6   GLUCOSE 89 79 102*   CALCIUM 8.7 8.6 9.7   MAGNESIUM 2.0 1.6 1.7   PHOSPHORUS  --  2.5  --    TSH  --  3.460 4.380*         Lab 05/10/24  0532 05/09/24  2317   TOTAL PROTEIN 6.0 7.1   ALBUMIN 3.7 4.1   GLOBULIN 2.3 3.0   ALT (SGPT) 15 19   AST (SGOT) 11 13   BILIRUBIN 0.3 0.4   ALK PHOS 45 54         Lab 05/09/24  2317   HSTROP T <6                 Brief Urine Lab Results  (Last result in the past 365 days)        Color   Clarity   Blood   Leuk Est   Nitrite   Protein   CREAT   Urine HCG        05/10/24 0113 Yellow   Clear   Small (1+)   Negative   Negative   Trace                 Microbiology Results (last 10 days)       Procedure Component Value - Date/Time    COVID-19, FLU A/B, RSV PCR 1 HR TAT - Swab, Nasopharynx [[id number]]  (Normal) Collected: 05/09/24 2318    Lab Status: Final result Specimen: Swab from Nasopharynx Updated: 05/10/24 0008     COVID19 Not Detected     Influenza A PCR Not Detected     Influenza B PCR Not Detected     RSV, PCR Not Detected    Narrative:      Fact sheet for providers: https://www.fda.gov/media/981615/download    Fact sheet for patients: https://www.fda.gov/media/591214/download    Test performed by PCR.    Urine Culture - Urine, Urine, Clean Catch [[id number]] Collected: 05/03/24 2142    Lab Status: Final result Specimen: Urine, Clean Catch Updated: 05/05/24 1313     Urine Culture <25,000 CFU/mL Mixed Zeny Isolated    Narrative:      Specimen contains mixed organisms of questionable pathogenicity suggestive of contamination. If symptoms persist, suggest recollection.  Colonization of the urinary tract without infection is common. Treatment is discouraged unless the patient is symptomatic, pregnant, or undergoing an invasive urologic procedure.            XR Chest 1 View    Result Date: 5/3/2024  No active disease.   Electronically Signed By-JENN PETER MD On:5/3/2024 8:49 PM                Results for orders placed during the hospital encounter of  05/09/24    Adult Transthoracic Echo Complete W/ Cont if Necessary Per Protocol    Interpretation Summary    The study is technically difficult for diagnosis.    Left ventricular systolic function is normal. Calculated left ventricular EF = 63%    Left ventricular diastolic function was normal.    There is mild bileaflet mitral valve prolapse present.      Labs Pending at Discharge:        Time spent on Discharge including face to face service:  35 minutes    Electronically signed by Stephanie Casey MD, 05/11/24, 8:44 AM EDT.

## 2024-05-11 NOTE — PLAN OF CARE
Goal Outcome Evaluation:  Plan of Care Reviewed With: patient        Progress: improving  Outcome Evaluation: patient resting, ortho vitals 117/80 p-86 standing, 140/91 p-104 sitting, 124/75 p-83 lying. no c/o pain, pt did c/o gas and maalox given. no nausea, dizziness this shift, patient alert and oriented, call light within reach.

## 2024-05-13 ENCOUNTER — TRANSITIONAL CARE MANAGEMENT TELEPHONE ENCOUNTER (OUTPATIENT)
Dept: CALL CENTER | Facility: HOSPITAL | Age: 34
End: 2024-05-13
Payer: COMMERCIAL

## 2024-05-13 NOTE — OUTREACH NOTE
Call Center TCM Note      Flowsheet Row Responses   [facility] patient discharged from? [facility]   Does the patient have one of the following disease processes/diagnoses(primary or secondary)? Other   TCM attempt successful? Yes  [VR from 2023 lists Jonnie BeckmanANGELIA]   Call start time 0956   Call end time 1004   Discharge diagnosis Hyponatremia   Medication alerts for this patient Pt has finished lovenox injections which she was on for 2 weeks post op   Meds reviewed with patient/caregiver? Yes   Is the patient having any side effects they believe may be caused by any medication additions or changes? No   Does the patient have all medications ordered at discharge? Yes   Prescription comments Pt aware of meds d/c'd at discharge: clonidine, HCTZ, lisinopril and spirolactone   Is the patient taking all medications as directed (includes completed medication regime)? Yes   Medication comments Norco for use prn pain (sees pain mgmt)   Comments Hospital f/u on 5/14/24@1100am   Does the patient have an appointment with their PCP within 7-14 days of discharge? Yes   Has home health visited the patient within 72 hours of discharge? N/A   Psychosocial issues? No   Comments Pt with recent bariatric surgery 4/24/24, returned to ED on 5/3/24 with dehydration   Did the patient receive a copy of their discharge instructions? Yes   Nursing interventions Reviewed instructions with patient   What is the patient's perception of their health status since discharge? Improving  [Pt reports she is much better post d/c after receiving fluids and med changes. Inquired about BP readings and reports no issues noted at a f/u with pain mgmt this am. Reports surgical incisions healed w/o issues.  Pt tolerated po intake w/o issues.]   Is the patient/caregiver able to teach back signs and symptoms related to disease process for when to call PCP? Yes   Is the patient/caregiver able to teach back signs and symptoms related to disease process for  when to call 911? Yes   Additional teach back comments Noted that pt to keep BP log and also needs CBC and chemistries at f/u   TCM call completed? Yes   Call end time 1004            Theresa Sandoval RN    5/13/2024, 10:13 EDT

## 2024-05-14 ENCOUNTER — OFFICE VISIT (OUTPATIENT)
Dept: FAMILY MEDICINE CLINIC | Facility: CLINIC | Age: 34
End: 2024-05-14
Payer: COMMERCIAL

## 2024-05-14 VITALS
TEMPERATURE: 98.1 F | WEIGHT: 278.7 LBS | HEART RATE: 78 BPM | SYSTOLIC BLOOD PRESSURE: 130 MMHG | BODY MASS INDEX: 37.75 KG/M2 | OXYGEN SATURATION: 98 % | HEIGHT: 72 IN | DIASTOLIC BLOOD PRESSURE: 78 MMHG

## 2024-05-14 DIAGNOSIS — E86.0 DEHYDRATION: ICD-10-CM

## 2024-05-14 DIAGNOSIS — I10 PRIMARY HYPERTENSION: ICD-10-CM

## 2024-05-14 DIAGNOSIS — E87.1 HYPONATREMIA: ICD-10-CM

## 2024-05-14 DIAGNOSIS — Z09 HOSPITAL DISCHARGE FOLLOW-UP: Primary | ICD-10-CM

## 2024-05-14 PROCEDURE — 3075F SYST BP GE 130 - 139MM HG: CPT

## 2024-05-14 PROCEDURE — 1160F RVW MEDS BY RX/DR IN RCRD: CPT

## 2024-05-14 PROCEDURE — 1159F MED LIST DOCD IN RCRD: CPT

## 2024-05-14 PROCEDURE — 3078F DIAST BP <80 MM HG: CPT

## 2024-05-14 PROCEDURE — 1125F AMNT PAIN NOTED PAIN PRSNT: CPT

## 2024-05-14 PROCEDURE — 99214 OFFICE O/P EST MOD 30 MIN: CPT

## 2024-05-14 NOTE — PROGRESS NOTES
Meghan Lui presents to [facility] who presents to the clinic for hospital follow-up.      History of Present Illness  This is a 33-year-old female who presents to clinic for hospital follow-up.    Patient was admitted to the hospital on 5/9/2024, patient was discharged on 5/11/2024, TCM phone call was completed on 5/13/2024.    Patient was admitted to the hospital for hyponatremia.  Of note, patient had bariatric surgery on 4/24.  Patient states that it was a rough few weeks, states that she had difficulty in eating or drinking anything at all.  States that she was in quite a bit of pain, felt extremely nauseous, and even small sips of water were difficult for her to achieve.  She definitely was not eating, as she could not, and then thus developed pretty intense dehydration, some pretty severe dizziness, weakness, neuropathy symptoms, went to the ER originally on 5/3, was given IV fluids and sent home, and then symptoms worsened and thus she went on 5/09 and was admitted to the hospital.  Patient there was treated for hyponatremia and severe dehydration, given IV fluids, and states that she is feeling much better.  Patient states that the weakness is still present, but she is able to eat and drink appropriately, has not had any more dizziness, no other symptoms, and is here just to follow-up.    She is also done pretty well from bariatric surgery in regards to weight loss, last visit here was 303, is now at 278, states that she is really happy with that she can already tell a difference in her other chronic aches and pains the improvement.  Is hoping to see further improvement once she is able to continue with her diet and exercise regimen when she gets a little stronger after her most recent hospital trip.  Denies any other new issues.    Patient also seen a general surgeon in regards to her at bedtime and boil that she had on her buttock.  He does state that it needs  "surgery, but wanted her to go through and complete bariatric surgery before removal.  Patient states that overall is doing okay for now, we will follow-up with him in the next month to month and a half to discuss removal of this further.    The following portions of the patient's history were personally reviewed and updated as appropriate: allergies, current medications, past medical history, past surgical history, past family history, and past social history.       Objective   Vital Signs:   /78 (BP Location: Left arm, Patient Position: Sitting, Cuff Size: Large Adult)   Pulse 78   Temp 98.1 °F (36.7 °C)   Ht 185.4 cm (73\")   Wt 126 kg (278 lb 11.2 oz)   SpO2 98%   BMI 36.77 kg/m²     Body mass index is 36.77 kg/m².    All labs, imaging, test results, and specialty provider notes reviewed with patient.     Physical Exam  Vitals reviewed.   Constitutional:       Appearance: Normal appearance.   Cardiovascular:      Rate and Rhythm: Normal rate and regular rhythm.      Pulses: Normal pulses.      Heart sounds: Normal heart sounds.   Pulmonary:      Effort: Pulmonary effort is normal.      Breath sounds: Normal breath sounds.   Neurological:      General: No focal deficit present.      Mental Status: She is alert and oriented to person, place, and time.                               Assessment and Plan:  Diagnoses and all orders for this visit:    1. Hospital discharge follow-up (Primary)    2. Hyponatremia  Comments:  Repeat blood work, treat based off results.  If still low, may consider referral to specialist.  Orders:  -     CBC Auto Differential; Future  -     Comprehensive Metabolic Panel; Future  -     Urinalysis With Culture If Indicated -; Future    3. Dehydration  Comments:  Make sure patient staying very hydrated, discussed this extensively.  Orders:  -     CBC Auto Differential; Future  -     Comprehensive Metabolic Panel; Future  -     Urinalysis With Culture If Indicated -; Future    4. " Primary hypertension  -     CBC Auto Differential; Future  -     Comprehensive Metabolic Panel; Future  -     Urinalysis With Culture If Indicated -; Future          Follow Up:  Return in about 2 months (around 7/14/2024).    Patient was given instructions and counseling regarding her condition or for health maintenance advice. Please see specific information pulled into the AVS if appropriate.

## 2024-05-28 ENCOUNTER — LAB (OUTPATIENT)
Dept: LAB | Facility: HOSPITAL | Age: 34
End: 2024-05-28
Payer: COMMERCIAL

## 2024-05-28 ENCOUNTER — OFFICE VISIT (OUTPATIENT)
Dept: FAMILY MEDICINE CLINIC | Facility: CLINIC | Age: 34
End: 2024-05-28
Payer: COMMERCIAL

## 2024-05-28 VITALS
BODY MASS INDEX: 36.11 KG/M2 | HEART RATE: 79 BPM | OXYGEN SATURATION: 98 % | SYSTOLIC BLOOD PRESSURE: 130 MMHG | TEMPERATURE: 98.2 F | HEIGHT: 72 IN | WEIGHT: 266.6 LBS | DIASTOLIC BLOOD PRESSURE: 79 MMHG

## 2024-05-28 DIAGNOSIS — G43.009 MIGRAINE WITHOUT AURA AND WITHOUT STATUS MIGRAINOSUS, NOT INTRACTABLE: ICD-10-CM

## 2024-05-28 DIAGNOSIS — Z98.84 HISTORY OF BARIATRIC SURGERY: ICD-10-CM

## 2024-05-28 DIAGNOSIS — E66.9 OBESITY, CLASS II, BMI 35-39.9: ICD-10-CM

## 2024-05-28 DIAGNOSIS — M51.36 DDD (DEGENERATIVE DISC DISEASE), LUMBAR: Primary | ICD-10-CM

## 2024-05-28 DIAGNOSIS — E86.0 DEHYDRATION: ICD-10-CM

## 2024-05-28 DIAGNOSIS — I10 PRIMARY HYPERTENSION: ICD-10-CM

## 2024-05-28 DIAGNOSIS — E87.1 HYPONATREMIA: ICD-10-CM

## 2024-05-28 LAB
ALBUMIN SERPL-MCNC: 4 G/DL (ref 3.5–5.2)
ALBUMIN/GLOB SERPL: 1.4 G/DL
ALP SERPL-CCNC: 58 U/L (ref 39–117)
ALT SERPL W P-5'-P-CCNC: 11 U/L (ref 1–33)
ANION GAP SERPL CALCULATED.3IONS-SCNC: 16 MMOL/L (ref 5–15)
AST SERPL-CCNC: 7 U/L (ref 1–32)
BACTERIA UR QL AUTO: NORMAL /HPF
BASOPHILS # BLD AUTO: 0.06 10*3/MM3 (ref 0–0.2)
BASOPHILS NFR BLD AUTO: 0.8 % (ref 0–1.5)
BILIRUB SERPL-MCNC: 0.3 MG/DL (ref 0–1.2)
BILIRUB UR QL STRIP: NEGATIVE
BUN SERPL-MCNC: 4 MG/DL (ref 6–20)
BUN/CREAT SERPL: 4.8 (ref 7–25)
CALCIUM SPEC-SCNC: 9 MG/DL (ref 8.6–10.5)
CHLORIDE SERPL-SCNC: 104 MMOL/L (ref 98–107)
CLARITY UR: CLEAR
CO2 SERPL-SCNC: 20 MMOL/L (ref 22–29)
COLOR UR: YELLOW
CREAT SERPL-MCNC: 0.83 MG/DL (ref 0.57–1)
DEPRECATED RDW RBC AUTO: 46 FL (ref 37–54)
EGFRCR SERPLBLD CKD-EPI 2021: 95 ML/MIN/1.73
EOSINOPHIL # BLD AUTO: 0.04 10*3/MM3 (ref 0–0.4)
EOSINOPHIL NFR BLD AUTO: 0.5 % (ref 0.3–6.2)
ERYTHROCYTE [DISTWIDTH] IN BLOOD BY AUTOMATED COUNT: 13.5 % (ref 12.3–15.4)
GLOBULIN UR ELPH-MCNC: 2.8 GM/DL
GLUCOSE SERPL-MCNC: 74 MG/DL (ref 65–99)
GLUCOSE UR STRIP-MCNC: NEGATIVE MG/DL
HCT VFR BLD AUTO: 43.2 % (ref 34–46.6)
HGB BLD-MCNC: 14.7 G/DL (ref 12–15.9)
HGB UR QL STRIP.AUTO: NEGATIVE
HOLD SPECIMEN: NORMAL
HYALINE CASTS UR QL AUTO: NORMAL /LPF
IMM GRANULOCYTES # BLD AUTO: 0.02 10*3/MM3 (ref 0–0.05)
IMM GRANULOCYTES NFR BLD AUTO: 0.3 % (ref 0–0.5)
KETONES UR QL STRIP: ABNORMAL
LEUKOCYTE ESTERASE UR QL STRIP.AUTO: NEGATIVE
LYMPHOCYTES # BLD AUTO: 1.46 10*3/MM3 (ref 0.7–3.1)
LYMPHOCYTES NFR BLD AUTO: 18.5 % (ref 19.6–45.3)
MCH RBC QN AUTO: 31.5 PG (ref 26.6–33)
MCHC RBC AUTO-ENTMCNC: 34 G/DL (ref 31.5–35.7)
MCV RBC AUTO: 92.7 FL (ref 79–97)
MONOCYTES # BLD AUTO: 0.59 10*3/MM3 (ref 0.1–0.9)
MONOCYTES NFR BLD AUTO: 7.5 % (ref 5–12)
NEUTROPHILS NFR BLD AUTO: 5.72 10*3/MM3 (ref 1.7–7)
NEUTROPHILS NFR BLD AUTO: 72.4 % (ref 42.7–76)
NITRITE UR QL STRIP: NEGATIVE
NRBC BLD AUTO-RTO: 0 /100 WBC (ref 0–0.2)
PH UR STRIP.AUTO: 6 [PH] (ref 5–8)
PLATELET # BLD AUTO: 221 10*3/MM3 (ref 140–450)
PMV BLD AUTO: 10.7 FL (ref 6–12)
POTASSIUM SERPL-SCNC: 3.4 MMOL/L (ref 3.5–5.2)
PROT SERPL-MCNC: 6.8 G/DL (ref 6–8.5)
PROT UR QL STRIP: ABNORMAL
RBC # BLD AUTO: 4.66 10*6/MM3 (ref 3.77–5.28)
RBC # UR STRIP: NORMAL /HPF
REF LAB TEST METHOD: NORMAL
SODIUM SERPL-SCNC: 140 MMOL/L (ref 136–145)
SP GR UR STRIP: 1.03 (ref 1–1.03)
SQUAMOUS #/AREA URNS HPF: NORMAL /HPF
UROBILINOGEN UR QL STRIP: ABNORMAL
WBC # UR STRIP: NORMAL /HPF
WBC NRBC COR # BLD AUTO: 7.89 10*3/MM3 (ref 3.4–10.8)

## 2024-05-28 PROCEDURE — 1125F AMNT PAIN NOTED PAIN PRSNT: CPT

## 2024-05-28 PROCEDURE — 85025 COMPLETE CBC W/AUTO DIFF WBC: CPT

## 2024-05-28 PROCEDURE — 80053 COMPREHEN METABOLIC PANEL: CPT

## 2024-05-28 PROCEDURE — 1160F RVW MEDS BY RX/DR IN RCRD: CPT

## 2024-05-28 PROCEDURE — 81001 URINALYSIS AUTO W/SCOPE: CPT

## 2024-05-28 PROCEDURE — 99214 OFFICE O/P EST MOD 30 MIN: CPT

## 2024-05-28 PROCEDURE — 1159F MED LIST DOCD IN RCRD: CPT

## 2024-05-28 PROCEDURE — 3075F SYST BP GE 130 - 139MM HG: CPT

## 2024-05-28 PROCEDURE — 3078F DIAST BP <80 MM HG: CPT

## 2024-05-28 PROCEDURE — 36415 COLL VENOUS BLD VENIPUNCTURE: CPT

## 2024-05-28 NOTE — PROGRESS NOTES
"Meghan uLi presents to [facility] who presents to clinic for follow-up.      History of Present Illness  This is a 34-year-old female who presents to the clinic for follow-up.    Bariatric surgery: Patient states that she is finally doing pretty well, states that she is able to keep down solid foods now, states that she is not having any difficulty in keeping liquids down either although she does have some complaints of worsening dry mouth.  Does actually follow-up with bariatric surgery on Thursday this week, but states that she is doing much better than she was.  Knows that she needs to repeat her blood work, so we will be getting that done after she leaves the office today.  Denies any symptoms.    Degenerative disc disease: Patient does still have some issues with her lower back, does continue to follow-up with pain management, but patient states that she is just very stiff in the morning now that she has come off of her diclofenac that she used to take on a daily basis.  Patient states that the overall pain and shooting pain that comes from her back down into her hips has almost resolved, but it is more the stiffness that is an issue.  Has tried over-the-counter creams which have not been very effective, and is wondering what other options that she has.    The following portions of the patient's history were personally reviewed and updated as appropriate: allergies, current medications, past medical history, past surgical history, past family history, and past social history.       Objective   Vital Signs:   /79 (BP Location: Left arm, Patient Position: Sitting, Cuff Size: Large Adult)   Pulse 79   Temp 98.2 °F (36.8 °C)   Ht 185.4 cm (73\")   Wt 121 kg (266 lb 9.6 oz)   SpO2 98%   BMI 35.17 kg/m²     Body mass index is 35.17 kg/m².    All labs, imaging, test results, and specialty provider notes reviewed with patient.     Physical Exam  Vitals reviewed. "   Constitutional:       Appearance: Normal appearance.   Cardiovascular:      Rate and Rhythm: Normal rate and regular rhythm.      Pulses: Normal pulses.      Heart sounds: Normal heart sounds.   Pulmonary:      Effort: Pulmonary effort is normal.      Breath sounds: Normal breath sounds.   Neurological:      General: No focal deficit present.      Mental Status: She is alert and oriented to person, place, and time.              Assessment and Plan:  Diagnoses and all orders for this visit:    1. DDD (degenerative disc disease), lumbar (Primary)  Comments:  Prescribe Rx alternative cream, continue to follow-up with pain management.  Orders:  -     Ibuprofen 3 %, Gabapentin 10 %, Baclofen 2 %, lidocaine 4 %; Apply 1-2 g topically to the appropriate area as directed 3 (Three) to 4 (Four) times daily.  Dispense: 90 g; Refill: 0    2. Obesity, Class II, BMI 35-39.9  Comments:  Continue to follow-up with bariatric surgery.    3. Dehydration  Comments:  Likely resolved, patient to repeat blood work today.  Continue with increased water intake, follow-up with bariatric.    4. History of bariatric surgery          Follow Up:  No follow-ups on file.    Patient was given instructions and counseling regarding her condition or for health maintenance advice. Please see specific information pulled into the AVS if appropriate.

## 2024-05-30 ENCOUNTER — OFFICE VISIT (OUTPATIENT)
Dept: BARIATRICS/WEIGHT MGMT | Facility: CLINIC | Age: 34
End: 2024-05-30
Payer: COMMERCIAL

## 2024-05-30 VITALS
SYSTOLIC BLOOD PRESSURE: 136 MMHG | WEIGHT: 265 LBS | DIASTOLIC BLOOD PRESSURE: 84 MMHG | HEART RATE: 78 BPM | TEMPERATURE: 98.1 F | HEIGHT: 72 IN | BODY MASS INDEX: 35.89 KG/M2

## 2024-05-30 DIAGNOSIS — F41.8 DEPRESSION WITH ANXIETY: ICD-10-CM

## 2024-05-30 DIAGNOSIS — I10 PRIMARY HYPERTENSION: ICD-10-CM

## 2024-05-30 DIAGNOSIS — K44.9 PARAESOPHAGEAL HERNIA: ICD-10-CM

## 2024-05-30 DIAGNOSIS — E66.9 OBESITY, CLASS II, BMI 35-39.9: Primary | ICD-10-CM

## 2024-05-30 DIAGNOSIS — F33.41 RECURRENT MAJOR DEPRESSIVE DISORDER, IN PARTIAL REMISSION: ICD-10-CM

## 2024-05-30 DIAGNOSIS — E28.2 PCOS (POLYCYSTIC OVARIAN SYNDROME): ICD-10-CM

## 2024-05-30 DIAGNOSIS — R29.818 SUSPECTED SLEEP APNEA: ICD-10-CM

## 2024-05-30 DIAGNOSIS — Z98.84 HISTORY OF BARIATRIC SURGERY: ICD-10-CM

## 2024-05-30 PROBLEM — R42 DIZZINESS: Status: ACTIVE | Noted: 2024-05-30

## 2024-05-30 PROBLEM — R11.2 NAUSEA & VOMITING: Status: ACTIVE | Noted: 2024-05-30

## 2024-05-30 PROCEDURE — 1159F MED LIST DOCD IN RCRD: CPT | Performed by: NURSE PRACTITIONER

## 2024-05-30 PROCEDURE — 3075F SYST BP GE 130 - 139MM HG: CPT | Performed by: NURSE PRACTITIONER

## 2024-05-30 PROCEDURE — 99024 POSTOP FOLLOW-UP VISIT: CPT | Performed by: NURSE PRACTITIONER

## 2024-05-30 PROCEDURE — 1160F RVW MEDS BY RX/DR IN RCRD: CPT | Performed by: NURSE PRACTITIONER

## 2024-05-30 PROCEDURE — 3079F DIAST BP 80-89 MM HG: CPT | Performed by: NURSE PRACTITIONER

## 2024-05-30 RX ORDER — POTASSIUM CHLORIDE 20 MEQ/1
TABLET, EXTENDED RELEASE ORAL
Qty: 21 TABLET | Refills: 0 | Status: SHIPPED | OUTPATIENT
Start: 2024-05-30 | End: 2024-06-13

## 2024-05-30 RX ORDER — SODIUM CHLORIDE, SODIUM LACTATE, POTASSIUM CHLORIDE, CALCIUM CHLORIDE 600; 310; 30; 20 MG/100ML; MG/100ML; MG/100ML; MG/100ML
1000 INJECTION, SOLUTION INTRAVENOUS ONCE
Status: CANCELLED | OUTPATIENT
Start: 2024-05-30

## 2024-05-30 RX ORDER — METOCLOPRAMIDE 10 MG/1
10 TABLET ORAL
Qty: 56 TABLET | Refills: 0 | Status: SHIPPED | OUTPATIENT
Start: 2024-05-30 | End: 2024-06-13

## 2024-05-30 RX ORDER — PANTOPRAZOLE SODIUM 40 MG/1
40 TABLET, DELAYED RELEASE ORAL DAILY
Qty: 90 TABLET | Refills: 0 | Status: SHIPPED | OUTPATIENT
Start: 2024-05-30

## 2024-05-30 NOTE — PROGRESS NOTES
[facility] BARIATRIC SURGERY  [address]  [facility] [id number] [phone]  [address]  [facility] [address] [phone]  Dept: [phone]  5/30/2024      Meghan Lui.  [id number]  [id number]  1990  female      Chief Complaint   Patient presents with    Post-op     1 mo PO sleeve       BH Post-Op Bariatric Surgery:   Meghan Lui is status post Laparoscopic Sleeve procedure, performed on 4/24/24     HPI:       Today's weight is 120 kg (265 lb) pounds, today's BMI is Body mass index is 36.29 kg/m²., she has a loss of 19 pounds since the last visit and her weight loss since surgery is 44 pounds. The patient reports a decreased portion size and loss of appetite.    Meghan Lui reports tolerating her dietary progression until this last week and is now having belching and burping with any liquids which she reports are painful. She was previously tolerating soft foods and shakes but is not only tolerating small amounts of clear liquids but is falling short of her goal and not getting much protein in. She reports vomiting with 2 bites of yogurt yesterday. She reports dizziness with position changes, dry mouth.     Diet and Exercise: Diet history reviewed and discussed with the patient. Weight loss/gains to date discussed with the patient. The patient states they are eating 60 grams of protein per day. She reports eating 3 meals per day, a typical portion size of 1/3 cup, eating 0-1 snacks per day, drinking 5-6 or more 8-oz. glasses of water per day, no carbonated beverage consumption and exercising regularly- walking 5 days per week    Supplements: flinstones MTV with iron.     Review of Systems   Constitutional:  Positive for appetite change and fatigue. Negative for unexpected weight change.   HENT: Negative.     Eyes: Negative.    Respiratory: Negative.     Cardiovascular: Negative.  Negative for leg swelling.    Gastrointestinal:  Positive for constipation, nausea and vomiting. Negative for abdominal distention, abdominal pain and diarrhea.   Genitourinary:  Negative for difficulty urinating, frequency and urgency.   Musculoskeletal:  Negative for back pain.   Skin: Negative.    Psychiatric/Behavioral: Negative.     All other systems reviewed and are negative.    Patient Active Problem List   Diagnosis    Chondromalacia, bilateral knee    Intractable chronic migraine without aura and without status migrainosus    Insomnia    DDD (degenerative disc disease), lumbar    Neuropathy    Primary hypertension    Recurrent major depressive disorder, in partial remission    Hashimoto's thyroiditis    Irritable bowel syndrome with both constipation and diarrhea    Mood disorder    Migraines    Lumbar radiculopathy    Hypothyroidism    Arthritis    Ankle edema    Hyperlipemia    Overflow stress urinary incontinence in female    Heavy period    Multiple joint pain    Balance disorder    Depression with anxiety    Bipolar 2 disorder    Hidradenitis    PCOS (polycystic ovarian syndrome)    Witnessed apneic spells    Sleep-related bruxism    Restless legs syndrome (RLS)    Hypersomnia    Suspected sleep apnea    Snoring    Hidradenitis suppurativa of anus    Paraesophageal hernia    Osteoarthritis of patellofemoral joints of both knees    Obesity, Class II, BMI 35-39.9    Drug-induced myoclonus    Hyponatremia    History of bariatric surgery       Past Medical History:   Diagnosis Date    Anemia april 2023    iron defiency anemia    Anxiety     Arthritis     Asthma     Back pain     Bipolar 2 disorder     Bladder problem     Cholelithiasis     Removed October 2011    Cluster headache 2011    Condition not found     HERNIATED DISC (UNSPECIFIED)     Cyst of skin     Depression     Difficulty walking     Foot pain, left 12/04/2019    Headache, tension-type 2005    Heel pain     Hidradenitis suppurativa     on med    HL (hearing loss)     Hx  of blood diseases     Hx of degenerative disc disease     Hyperlipidemia     Hypertension 2015    bloop pressure elevates when with migraines    Hypothyroidism     Ingrown toenail     Irritable bowel syndrome     Leg pain     Leg swelling     Lumbago     Memory loss     Migraines     Mood disorder     Muscle cramps     Neuromuscular disorder 2018    Numbness in feet     Obesity     Thyroid disease     Visual impairment        The following portions of the patient's history were reviewed and updated as appropriate: allergies, current medications, past family history, past medical history, past social history, past surgical history, and problem list.    Vitals:    05/30/24 1147   BP: 136/84   Pulse: 78   Temp: 98.1 °F (36.7 °C)       Physical Exam  Vitals and nursing note reviewed.   Constitutional:       Appearance: She is well-developed.   Neck:      Thyroid: No thyromegaly.   Cardiovascular:      Rate and Rhythm: Normal rate.   Pulmonary:      Effort: Pulmonary effort is normal. No respiratory distress.   Abdominal:      Palpations: Abdomen is soft.   Musculoskeletal:         General: No tenderness.   Skin:     General: Skin is warm and dry.   Neurological:      Mental Status: She is alert.   Psychiatric:         Behavior: Behavior normal.     Assessment:   Post-op, the patient is doing well.     Encounter Diagnoses   Name Primary?    Obesity, Class II, BMI 35-39.9 Yes    PCOS (polycystic ovarian syndrome)     Primary hypertension     Paraesophageal hernia     Depression with anxiety     Recurrent major depressive disorder, in partial remission     History of bariatric surgery     Suspected sleep apnea        Plan:   Will plan on outpatient IV fluids with thiamine. Start pantoprazole and reglan. Will take a one day break from oral intake after getting fluids and will call if symptoms aren't resolving with treatment will consider EGD.  Encouraged patient to be sure to get plenty of lean protein per day through small  frequent meals all with a protein source.   Activity restrictions: none.   Recommended patient be sure to get at least 70 grams of protein per day by eating small, frequent meals all with high lean protein choices. Be sure to limit/cut back on daily carbohydrate intake. Discussed with the patient the recommended amount of water per day to intake- half of body weight in ounces. Reviewed vitamin requirements. Be sure to do routine exercise, 150 minutes per week minimum, including both cardio and strength training.     Instructions / Recommendations: dietary counseling recommended, recommended a daily protein intake of  grams, vitamin supplement(s) recommended, recommended exercising at least 150 minutes per week, behavior modifications recommended and instructed to call the office for concerns, questions, or problems.     The patient was instructed to follow up in 2 months    Total time spent during this encounter today was 35 minutes

## 2024-05-30 NOTE — H&P (VIEW-ONLY)
[facility] BARIATRIC SURGERY  [address]  [facility] [id number] [phone]  [address]  [facility] [address] [phone]  Dept: [phone]  5/30/2024      Meghan Lui.  [id number]  [id number]  1990  female      Chief Complaint   Patient presents with    Post-op     1 mo PO sleeve       BH Post-Op Bariatric Surgery:   Meghan Lui is status post Laparoscopic Sleeve procedure, performed on 4/24/24     HPI:       Today's weight is 120 kg (265 lb) pounds, today's BMI is Body mass index is 36.29 kg/m²., she has a loss of 19 pounds since the last visit and her weight loss since surgery is 44 pounds. The patient reports a decreased portion size and loss of appetite.    Meghan Lui reports tolerating her dietary progression until this last week and is now having belching and burping with any liquids which she reports are painful. She was previously tolerating soft foods and shakes but is not only tolerating small amounts of clear liquids but is falling short of her goal and not getting much protein in. She reports vomiting with 2 bites of yogurt yesterday. She reports dizziness with position changes, dry mouth.     Diet and Exercise: Diet history reviewed and discussed with the patient. Weight loss/gains to date discussed with the patient. The patient states they are eating 60 grams of protein per day. She reports eating 3 meals per day, a typical portion size of 1/3 cup, eating 0-1 snacks per day, drinking 5-6 or more 8-oz. glasses of water per day, no carbonated beverage consumption and exercising regularly- walking 5 days per week    Supplements: flinstones MTV with iron.     Review of Systems   Constitutional:  Positive for appetite change and fatigue. Negative for unexpected weight change.   HENT: Negative.     Eyes: Negative.    Respiratory: Negative.     Cardiovascular: Negative.  Negative for leg swelling.    Gastrointestinal:  Positive for constipation, nausea and vomiting. Negative for abdominal distention, abdominal pain and diarrhea.   Genitourinary:  Negative for difficulty urinating, frequency and urgency.   Musculoskeletal:  Negative for back pain.   Skin: Negative.    Psychiatric/Behavioral: Negative.     All other systems reviewed and are negative.    Patient Active Problem List   Diagnosis    Chondromalacia, bilateral knee    Intractable chronic migraine without aura and without status migrainosus    Insomnia    DDD (degenerative disc disease), lumbar    Neuropathy    Primary hypertension    Recurrent major depressive disorder, in partial remission    Hashimoto's thyroiditis    Irritable bowel syndrome with both constipation and diarrhea    Mood disorder    Migraines    Lumbar radiculopathy    Hypothyroidism    Arthritis    Ankle edema    Hyperlipemia    Overflow stress urinary incontinence in female    Heavy period    Multiple joint pain    Balance disorder    Depression with anxiety    Bipolar 2 disorder    Hidradenitis    PCOS (polycystic ovarian syndrome)    Witnessed apneic spells    Sleep-related bruxism    Restless legs syndrome (RLS)    Hypersomnia    Suspected sleep apnea    Snoring    Hidradenitis suppurativa of anus    Paraesophageal hernia    Osteoarthritis of patellofemoral joints of both knees    Obesity, Class II, BMI 35-39.9    Drug-induced myoclonus    Hyponatremia    History of bariatric surgery       Past Medical History:   Diagnosis Date    Anemia april 2023    iron defiency anemia    Anxiety     Arthritis     Asthma     Back pain     Bipolar 2 disorder     Bladder problem     Cholelithiasis     Removed October 2011    Cluster headache 2011    Condition not found     HERNIATED DISC (UNSPECIFIED)     Cyst of skin     Depression     Difficulty walking     Foot pain, left 12/04/2019    Headache, tension-type 2005    Heel pain     Hidradenitis suppurativa     on med    HL (hearing loss)     Hx  of blood diseases     Hx of degenerative disc disease     Hyperlipidemia     Hypertension 2015    bloop pressure elevates when with migraines    Hypothyroidism     Ingrown toenail     Irritable bowel syndrome     Leg pain     Leg swelling     Lumbago     Memory loss     Migraines     Mood disorder     Muscle cramps     Neuromuscular disorder 2018    Numbness in feet     Obesity     Thyroid disease     Visual impairment        The following portions of the patient's history were reviewed and updated as appropriate: allergies, current medications, past family history, past medical history, past social history, past surgical history, and problem list.    Vitals:    05/30/24 1147   BP: 136/84   Pulse: 78   Temp: 98.1 °F (36.7 °C)       Physical Exam  Vitals and nursing note reviewed.   Constitutional:       Appearance: She is well-developed.   Neck:      Thyroid: No thyromegaly.   Cardiovascular:      Rate and Rhythm: Normal rate.   Pulmonary:      Effort: Pulmonary effort is normal. No respiratory distress.   Abdominal:      Palpations: Abdomen is soft.   Musculoskeletal:         General: No tenderness.   Skin:     General: Skin is warm and dry.   Neurological:      Mental Status: She is alert.   Psychiatric:         Behavior: Behavior normal.     Assessment:   Post-op, the patient is doing well.     Encounter Diagnoses   Name Primary?    Obesity, Class II, BMI 35-39.9 Yes    PCOS (polycystic ovarian syndrome)     Primary hypertension     Paraesophageal hernia     Depression with anxiety     Recurrent major depressive disorder, in partial remission     History of bariatric surgery     Suspected sleep apnea        Plan:   Will plan on outpatient IV fluids with thiamine. Start pantoprazole and reglan. Will take a one day break from oral intake after getting fluids and will call if symptoms aren't resolving with treatment will consider EGD.  Encouraged patient to be sure to get plenty of lean protein per day through small  frequent meals all with a protein source.   Activity restrictions: none.   Recommended patient be sure to get at least 70 grams of protein per day by eating small, frequent meals all with high lean protein choices. Be sure to limit/cut back on daily carbohydrate intake. Discussed with the patient the recommended amount of water per day to intake- half of body weight in ounces. Reviewed vitamin requirements. Be sure to do routine exercise, 150 minutes per week minimum, including both cardio and strength training.     Instructions / Recommendations: dietary counseling recommended, recommended a daily protein intake of  grams, vitamin supplement(s) recommended, recommended exercising at least 150 minutes per week, behavior modifications recommended and instructed to call the office for concerns, questions, or problems.     The patient was instructed to follow up in 2 months    Total time spent during this encounter today was 35 minutes

## 2024-05-31 ENCOUNTER — HOSPITAL ENCOUNTER (OUTPATIENT)
Dept: INFUSION THERAPY | Facility: HOSPITAL | Age: 34
Discharge: HOME | End: 2024-05-31
Payer: COMMERCIAL

## 2024-05-31 VITALS
OXYGEN SATURATION: 100 % | BODY MASS INDEX: 35.65 KG/M2 | DIASTOLIC BLOOD PRESSURE: 78 MMHG | TEMPERATURE: 98 F | HEIGHT: 72 IN | SYSTOLIC BLOOD PRESSURE: 133 MMHG | WEIGHT: 263.23 LBS | HEART RATE: 79 BPM | RESPIRATION RATE: 20 BRPM

## 2024-05-31 DIAGNOSIS — R42 DIZZINESS: Primary | ICD-10-CM

## 2024-05-31 DIAGNOSIS — R11.2 NAUSEA AND VOMITING, UNSPECIFIED VOMITING TYPE: ICD-10-CM

## 2024-05-31 PROCEDURE — 96361 HYDRATE IV INFUSION ADD-ON: CPT

## 2024-05-31 PROCEDURE — 25810000003 LACTATED RINGERS PER 1000 ML: Performed by: NURSE PRACTITIONER

## 2024-05-31 PROCEDURE — 96365 THER/PROPH/DIAG IV INF INIT: CPT

## 2024-05-31 PROCEDURE — 25010000002 ONDANSETRON PER 1 MG: Performed by: NURSE PRACTITIONER

## 2024-05-31 PROCEDURE — 96374 THER/PROPH/DIAG INJ IV PUSH: CPT

## 2024-05-31 PROCEDURE — 25010000002 DEXAMETHASONE PER 1 MG: Performed by: NURSE PRACTITIONER

## 2024-05-31 PROCEDURE — 96360 HYDRATION IV INFUSION INIT: CPT

## 2024-05-31 PROCEDURE — 25010000002 THIAMINE HCL 200 MG/2ML SOLUTION 2 ML VIAL: Performed by: NURSE PRACTITIONER

## 2024-05-31 PROCEDURE — 96375 TX/PRO/DX INJ NEW DRUG ADDON: CPT

## 2024-05-31 RX ORDER — SODIUM CHLORIDE, SODIUM LACTATE, POTASSIUM CHLORIDE, CALCIUM CHLORIDE 600; 310; 30; 20 MG/100ML; MG/100ML; MG/100ML; MG/100ML
1000 INJECTION, SOLUTION INTRAVENOUS ONCE
OUTPATIENT
Start: 2024-05-31

## 2024-05-31 RX ORDER — SODIUM CHLORIDE, SODIUM LACTATE, POTASSIUM CHLORIDE, CALCIUM CHLORIDE 600; 310; 30; 20 MG/100ML; MG/100ML; MG/100ML; MG/100ML
1000 INJECTION, SOLUTION INTRAVENOUS ONCE
Status: COMPLETED | OUTPATIENT
Start: 2024-05-31 | End: 2024-05-31

## 2024-05-31 RX ADMIN — SODIUM CHLORIDE, POTASSIUM CHLORIDE, SODIUM LACTATE AND CALCIUM CHLORIDE 1000 ML/HR: 600; 310; 30; 20 INJECTION, SOLUTION INTRAVENOUS at 11:42

## 2024-05-31 RX ADMIN — THIAMINE HYDROCHLORIDE: 100 INJECTION, SOLUTION INTRAMUSCULAR; INTRAVENOUS at 12:47

## 2024-05-31 RX ADMIN — DEXAMETHASONE SODIUM PHOSPHATE: 4 INJECTION, SOLUTION INTRAMUSCULAR; INTRAVENOUS at 12:13

## 2024-06-03 ENCOUNTER — PATIENT MESSAGE (OUTPATIENT)
Dept: BARIATRICS/WEIGHT MGMT | Facility: CLINIC | Age: 34
End: 2024-06-03
Payer: COMMERCIAL

## 2024-06-03 NOTE — TELEPHONE ENCOUNTER
I called and spoke to patient. She isnt feeling any better and is needing another round of fluids ordered to be done at [facility] [place] in [facility]. Can you put the orders in and let me know so I can call and get her on the schedule?  Thank you  Chasity

## 2024-06-04 ENCOUNTER — PREP FOR SURGERY (OUTPATIENT)
Dept: OTHER | Facility: HOSPITAL | Age: 34
End: 2024-06-04
Payer: COMMERCIAL

## 2024-06-04 DIAGNOSIS — Z98.84 S/P LAPAROSCOPIC SLEEVE GASTRECTOMY: ICD-10-CM

## 2024-06-04 DIAGNOSIS — R13.10 DYSPHAGIA: ICD-10-CM

## 2024-06-04 DIAGNOSIS — R11.2 NAUSEA AND VOMITING, UNSPECIFIED VOMITING TYPE: Primary | ICD-10-CM

## 2024-06-04 RX ORDER — SODIUM CHLORIDE, SODIUM LACTATE, POTASSIUM CHLORIDE, CALCIUM CHLORIDE 600; 310; 30; 20 MG/100ML; MG/100ML; MG/100ML; MG/100ML
30 INJECTION, SOLUTION INTRAVENOUS CONTINUOUS
OUTPATIENT
Start: 2024-06-04

## 2024-06-11 PROBLEM — R13.10 DYSPHAGIA: Status: ACTIVE | Noted: 2024-06-04

## 2024-06-11 PROBLEM — Z98.84 S/P LAPAROSCOPIC SLEEVE GASTRECTOMY: Status: ACTIVE | Noted: 2024-06-04

## 2024-06-18 ENCOUNTER — HOSPITAL ENCOUNTER (OUTPATIENT)
Facility: HOSPITAL | Age: 34
Setting detail: HOSPITAL OUTPATIENT SURGERY
Discharge: HOME | End: 2024-06-18
Attending: SURGERY | Admitting: SURGERY
Payer: COMMERCIAL

## 2024-06-18 ENCOUNTER — ANESTHESIA (OUTPATIENT)
Dept: GASTROENTEROLOGY | Facility: HOSPITAL | Age: 34
End: 2024-06-18
Payer: COMMERCIAL

## 2024-06-18 ENCOUNTER — ANESTHESIA EVENT (OUTPATIENT)
Dept: GASTROENTEROLOGY | Facility: HOSPITAL | Age: 34
End: 2024-06-18
Payer: COMMERCIAL

## 2024-06-18 VITALS
HEART RATE: 60 BPM | HEIGHT: 72 IN | SYSTOLIC BLOOD PRESSURE: 106 MMHG | WEIGHT: 256.2 LBS | RESPIRATION RATE: 16 BRPM | OXYGEN SATURATION: 98 % | DIASTOLIC BLOOD PRESSURE: 70 MMHG | BODY MASS INDEX: 34.7 KG/M2

## 2024-06-18 DIAGNOSIS — R11.2 NAUSEA AND VOMITING, UNSPECIFIED VOMITING TYPE: ICD-10-CM

## 2024-06-18 DIAGNOSIS — M51.36 DDD (DEGENERATIVE DISC DISEASE), LUMBAR: ICD-10-CM

## 2024-06-18 DIAGNOSIS — R13.10 DYSPHAGIA: ICD-10-CM

## 2024-06-18 DIAGNOSIS — Z98.84 S/P LAPAROSCOPIC SLEEVE GASTRECTOMY: ICD-10-CM

## 2024-06-18 LAB
B-HCG UR QL: NEGATIVE
EXPIRATION DATE: NORMAL
INTERNAL NEGATIVE CONTROL: NEGATIVE
INTERNAL POSITIVE CONTROL: POSITIVE
Lab: NORMAL

## 2024-06-18 PROCEDURE — C1726 CATH, BAL DIL, NON-VASCULAR: HCPCS | Performed by: SURGERY

## 2024-06-18 PROCEDURE — 25010000002 PROPOFOL 10 MG/ML EMULSION: Performed by: REGISTERED NURSE

## 2024-06-18 PROCEDURE — 81025 URINE PREGNANCY TEST: CPT | Performed by: SURGERY

## 2024-06-18 PROCEDURE — 43245 EGD DILATE STRICTURE: CPT | Performed by: SURGERY

## 2024-06-18 PROCEDURE — 25810000003 LACTATED RINGERS PER 1000 ML: Performed by: NURSE PRACTITIONER

## 2024-06-18 RX ORDER — SUCRALFATE 1 G/1
1 TABLET ORAL 3 TIMES DAILY
Qty: 90 TABLET | Refills: 1 | Status: SHIPPED | OUTPATIENT
Start: 2024-06-18

## 2024-06-18 RX ORDER — ONDANSETRON 2 MG/ML
4 INJECTION INTRAMUSCULAR; INTRAVENOUS ONCE AS NEEDED
Status: DISCONTINUED | OUTPATIENT
Start: 2024-06-18 | End: 2024-06-18 | Stop reason: HOSPADM

## 2024-06-18 RX ORDER — LIDOCAINE HYDROCHLORIDE 20 MG/ML
INJECTION, SOLUTION INFILTRATION; PERINEURAL AS NEEDED
Status: DISCONTINUED | OUTPATIENT
Start: 2024-06-18 | End: 2024-06-18 | Stop reason: SURG

## 2024-06-18 RX ORDER — PROPOFOL 10 MG/ML
VIAL (ML) INTRAVENOUS AS NEEDED
Status: DISCONTINUED | OUTPATIENT
Start: 2024-06-18 | End: 2024-06-18 | Stop reason: SURG

## 2024-06-18 RX ORDER — SODIUM CHLORIDE, SODIUM LACTATE, POTASSIUM CHLORIDE, CALCIUM CHLORIDE 600; 310; 30; 20 MG/100ML; MG/100ML; MG/100ML; MG/100ML
30 INJECTION, SOLUTION INTRAVENOUS CONTINUOUS
Status: DISCONTINUED | OUTPATIENT
Start: 2024-06-18 | End: 2024-06-18 | Stop reason: HOSPADM

## 2024-06-18 RX ADMIN — SODIUM CHLORIDE, POTASSIUM CHLORIDE, SODIUM LACTATE AND CALCIUM CHLORIDE 30 ML/HR: 600; 310; 30; 20 INJECTION, SOLUTION INTRAVENOUS at 06:59

## 2024-06-18 RX ADMIN — PROPOFOL 50 MG: 10 INJECTION, EMULSION INTRAVENOUS at 07:58

## 2024-06-18 RX ADMIN — PROPOFOL 50 MG: 10 INJECTION, EMULSION INTRAVENOUS at 07:57

## 2024-06-18 RX ADMIN — PROPOFOL 100 MG: 10 INJECTION, EMULSION INTRAVENOUS at 07:56

## 2024-06-18 RX ADMIN — LIDOCAINE HYDROCHLORIDE 100 MG: 20 INJECTION, SOLUTION INFILTRATION; PERINEURAL at 07:56

## 2024-06-18 NOTE — ANESTHESIA PREPROCEDURE EVALUATION
Anesthesia Evaluation     Patient summary reviewed and Nursing notes reviewed                Airway   Mallampati: II  Dental      Pulmonary    (+) a smoker Former, asthma,  Cardiovascular     ECG reviewed  Rhythm: regular  Rate: normal    (+) hypertension, hyperlipidemia      Neuro/Psych  (+) headaches, dizziness/light headedness, numbness, psychiatric history Bipolar  GI/Hepatic/Renal/Endo    (+) morbid obesity, hiatal hernia, thyroid problem     Musculoskeletal     (+) back pain  Abdominal    Substance History - negative use     OB/GYN negative ob/gyn ROS         Other   arthritis,                   Anesthesia Plan    ASA 3     MAC     intravenous induction     Anesthetic plan, risks, benefits, and alternatives have been provided, discussed and informed consent has been obtained with: patient.    CODE STATUS:

## 2024-06-18 NOTE — DISCHARGE INSTRUCTIONS
For the next 24 hours patient needs to be with a responsible adult.    For 24 hours DO NOT drive, operate machinery, appliances, drink alcohol, make important decisions or sign legal documents.    Start with a light or bland diet and advance to regular diet as tolerated.    Follow recommendations on procedure report provided by your doctor.    Call Dr Ferraro for problems [phone]    Problems may include but not limited to: large amounts of bleeding, trouble breathing, repeated vomiting, severe unrelieved pain, fever or chills.

## 2024-06-18 NOTE — OP NOTE
Surgeon: Steve Ferraro Jr., M.D.    Preoperative Diagnosis: Dysphagia status post sleeve gastrectomy and paraesophageal hernia repair    Postoperative Diagnosis: Gastritis    Procedure Performed: Transoral esophagogastroduodenoscopy with 18-20 balloon dilatation of pylorus    Indications: 34-year-old female 2 months status post sleeve gastrectomy and paraesophageal hernia repair with complaints of dysphagia.    Procedure:     The procedure, indications, preparation and potential, patient were explained to the patient, who indicated understanding and signed the corresponding consent forms.  The patient was identified, taken to the endoscopy suite, and placed on the left side down decubitus position.  The patient underwent a MAC anesthesia and was appropriately monitored through the case by the anesthesia personnel using continuous pulse oximetry, blood pressure, and cardiac monitoring.  A bite block was placed.  After adequate IV sedation and using a transoral technique a lubed flexible endoscope was placed in the hypopharynx and advanced to the second portion of the duodenum.  The pylorus was mildly strictured and the scope had to be advanced with some pressure into the duodenum.  The scope was then withdrawn back into the gastric sleeve.  There was no stricture noted in the gastric sleeve unless dictated below.  No polyps or ulcers were seen unless dictated below.  The scope was then withdrawn back into the esophagus.  The Z line was regular and no erosive esophagitis seen unless dictated below.  Scope was then advanced back down into the antrum.  A 18-20 balloon catheter was then advanced across the pylorus and taken up in sequence and each level held for approximately 1 minute.  The catheter was deflated and removed.  The pylorus dilated up nicely.  The scope was then completely withdrawn after decompressing the stomach.  The patient tolerated the procedure well and left the endoscopy suite in stable  condition.    The sleeve was of normal size without stricture or dilatation.  The pylorus opened up nicely after dilatation with the 18-20 balloon.  Patchy erythema noted in the gastric antrum but otherwise unremarkable.  Esophagus was unremarkable.  The Z-line was regular no erosive esophagitis noted.    Recommendations:     Will add Carafate and follow-up in the office as scheduled.  Continue with PPI.

## 2024-06-18 NOTE — ANESTHESIA POSTPROCEDURE EVALUATION
Patient: Meghan Lui    Procedure Summary       Date: 06/18/24 Room / Location:  [facility]    Anesthesia Start: 0753 Anesthesia Stop: 0806    Procedure: ESOPHAGOGASTRODUODENOSCOPY WITH BALLOON DILATATION 18-20MM (Esophagus) Diagnosis:       Nausea and vomiting, unspecified vomiting type      Dysphagia      S/P laparoscopic sleeve gastrectomy      (Nausea and vomiting, unspecified vomiting type [R11.2])      (Dysphagia [R13.10])      (S/P laparoscopic sleeve gastrectomy [Z98.84])    Surgeons: Steve Ferraro Jr., MD Provider: Cal Adams MD    Anesthesia Type: MAC ASA Status: 3            Anesthesia Type: MAC    Vitals  Vitals Value Taken Time   /76 06/18/24 0805   Temp     Pulse 65 06/18/24 0810   Resp 16 06/18/24 0805   SpO2 96 % 06/18/24 0810   Vitals shown include unfiled device data.        Post Anesthesia Care and Evaluation    Patient location during evaluation: PACU  Patient participation: complete - patient participated  Level of consciousness: awake and alert  Pain management: adequate    Airway patency: patent  Anesthetic complications: No anesthetic complications    Cardiovascular status: acceptable  Respiratory status: acceptable  Hydration status: acceptable    Comments: --------------------            06/18/24               0805     --------------------   BP:       114/76     Pulse:      68       Resp:       16       SpO2:      99%      --------------------

## 2024-07-09 RX ORDER — METOCLOPRAMIDE 10 MG/1
10 TABLET ORAL
Qty: 120 TABLET | Refills: 0 | Status: SHIPPED | OUTPATIENT
Start: 2024-07-09

## 2024-07-15 ENCOUNTER — OFFICE VISIT (OUTPATIENT)
Dept: FAMILY MEDICINE CLINIC | Facility: CLINIC | Age: 34
End: 2024-07-15
Payer: COMMERCIAL

## 2024-07-15 ENCOUNTER — LAB (OUTPATIENT)
Dept: LAB | Facility: HOSPITAL | Age: 34
End: 2024-07-15
Payer: COMMERCIAL

## 2024-07-15 VITALS
SYSTOLIC BLOOD PRESSURE: 118 MMHG | WEIGHT: 240.1 LBS | TEMPERATURE: 97.7 F | BODY MASS INDEX: 32.52 KG/M2 | OXYGEN SATURATION: 98 % | DIASTOLIC BLOOD PRESSURE: 82 MMHG | HEIGHT: 72 IN | HEART RATE: 74 BPM

## 2024-07-15 DIAGNOSIS — E03.9 HYPOTHYROIDISM, UNSPECIFIED TYPE: ICD-10-CM

## 2024-07-15 DIAGNOSIS — I10 PRIMARY HYPERTENSION: ICD-10-CM

## 2024-07-15 DIAGNOSIS — E28.2 PCOS (POLYCYSTIC OVARIAN SYNDROME): ICD-10-CM

## 2024-07-15 DIAGNOSIS — F41.8 DEPRESSION WITH ANXIETY: ICD-10-CM

## 2024-07-15 DIAGNOSIS — L73.9 FOLLICULITIS: Primary | ICD-10-CM

## 2024-07-15 DIAGNOSIS — Z98.84 HISTORY OF BARIATRIC SURGERY: ICD-10-CM

## 2024-07-15 DIAGNOSIS — E88.819 INSULIN RESISTANCE: ICD-10-CM

## 2024-07-15 DIAGNOSIS — K44.9 PARAESOPHAGEAL HERNIA: ICD-10-CM

## 2024-07-15 DIAGNOSIS — Z13.220 SCREENING FOR LIPID DISORDERS: ICD-10-CM

## 2024-07-15 DIAGNOSIS — E66.9 OBESITY, CLASS II, BMI 35-39.9: ICD-10-CM

## 2024-07-15 DIAGNOSIS — R29.818 SUSPECTED SLEEP APNEA: ICD-10-CM

## 2024-07-15 DIAGNOSIS — F33.41 RECURRENT MAJOR DEPRESSIVE DISORDER, IN PARTIAL REMISSION: ICD-10-CM

## 2024-07-15 LAB
25(OH)D3 SERPL-MCNC: 36.8 NG/ML (ref 30–100)
ALBUMIN SERPL-MCNC: 3.9 G/DL (ref 3.5–5.2)
ALBUMIN/GLOB SERPL: 1.4 G/DL
ALP SERPL-CCNC: 64 U/L (ref 39–117)
ALT SERPL W P-5'-P-CCNC: 11 U/L (ref 1–33)
ANION GAP SERPL CALCULATED.3IONS-SCNC: 17 MMOL/L (ref 5–15)
AST SERPL-CCNC: 12 U/L (ref 1–32)
BASOPHILS # BLD AUTO: 0.08 10*3/MM3 (ref 0–0.2)
BASOPHILS NFR BLD AUTO: 1.1 % (ref 0–1.5)
BILIRUB SERPL-MCNC: 0.3 MG/DL (ref 0–1.2)
BUN SERPL-MCNC: 5 MG/DL (ref 6–20)
BUN/CREAT SERPL: 6.8 (ref 7–25)
CALCIUM SPEC-SCNC: 9.4 MG/DL (ref 8.6–10.5)
CHLORIDE SERPL-SCNC: 104 MMOL/L (ref 98–107)
CHOLEST SERPL-MCNC: 147 MG/DL (ref 0–200)
CO2 SERPL-SCNC: 21 MMOL/L (ref 22–29)
CREAT SERPL-MCNC: 0.74 MG/DL (ref 0.57–1)
DEPRECATED RDW RBC AUTO: 46.5 FL (ref 37–54)
EGFRCR SERPLBLD CKD-EPI 2021: 109 ML/MIN/1.73
EOSINOPHIL # BLD AUTO: 0.03 10*3/MM3 (ref 0–0.4)
EOSINOPHIL NFR BLD AUTO: 0.4 % (ref 0.3–6.2)
ERYTHROCYTE [DISTWIDTH] IN BLOOD BY AUTOMATED COUNT: 14 % (ref 12.3–15.4)
FERRITIN SERPL-MCNC: 212 NG/ML (ref 13–150)
FOLATE SERPL-MCNC: 4.67 NG/ML (ref 4.78–24.2)
GLOBULIN UR ELPH-MCNC: 2.8 GM/DL
GLUCOSE SERPL-MCNC: 78 MG/DL (ref 65–99)
HCT VFR BLD AUTO: 44 % (ref 34–46.6)
HDLC SERPL-MCNC: 39 MG/DL (ref 40–60)
HGB BLD-MCNC: 14.6 G/DL (ref 12–15.9)
IMM GRANULOCYTES # BLD AUTO: 0.02 10*3/MM3 (ref 0–0.05)
IMM GRANULOCYTES NFR BLD AUTO: 0.3 % (ref 0–0.5)
IRON 24H UR-MRATE: 53 MCG/DL (ref 37–145)
LDLC SERPL CALC-MCNC: 86 MG/DL (ref 0–100)
LDLC/HDLC SERPL: 2.15 {RATIO}
LYMPHOCYTES # BLD AUTO: 1.25 10*3/MM3 (ref 0.7–3.1)
LYMPHOCYTES NFR BLD AUTO: 17.5 % (ref 19.6–45.3)
MCH RBC QN AUTO: 30.8 PG (ref 26.6–33)
MCHC RBC AUTO-ENTMCNC: 33.2 G/DL (ref 31.5–35.7)
MCV RBC AUTO: 92.8 FL (ref 79–97)
MONOCYTES # BLD AUTO: 0.42 10*3/MM3 (ref 0.1–0.9)
MONOCYTES NFR BLD AUTO: 5.9 % (ref 5–12)
NEUTROPHILS NFR BLD AUTO: 5.34 10*3/MM3 (ref 1.7–7)
NEUTROPHILS NFR BLD AUTO: 74.8 % (ref 42.7–76)
NRBC BLD AUTO-RTO: 0 /100 WBC (ref 0–0.2)
PLATELET # BLD AUTO: 265 10*3/MM3 (ref 140–450)
PMV BLD AUTO: 10.4 FL (ref 6–12)
POTASSIUM SERPL-SCNC: 2.9 MMOL/L (ref 3.5–5.2)
PREALB SERPL-MCNC: 11.9 MG/DL (ref 20–40)
PROT SERPL-MCNC: 6.7 G/DL (ref 6–8.5)
RBC # BLD AUTO: 4.74 10*6/MM3 (ref 3.77–5.28)
SODIUM SERPL-SCNC: 142 MMOL/L (ref 136–145)
TRIGL SERPL-MCNC: 120 MG/DL (ref 0–150)
TSH SERPL DL<=0.05 MIU/L-ACNC: 0.28 UIU/ML (ref 0.27–4.2)
VLDLC SERPL-MCNC: 22 MG/DL (ref 5–40)
WBC NRBC COR # BLD AUTO: 7.14 10*3/MM3 (ref 3.4–10.8)

## 2024-07-15 PROCEDURE — 84134 ASSAY OF PREALBUMIN: CPT

## 2024-07-15 PROCEDURE — 82746 ASSAY OF FOLIC ACID SERUM: CPT

## 2024-07-15 PROCEDURE — 82728 ASSAY OF FERRITIN: CPT

## 2024-07-15 PROCEDURE — 3074F SYST BP LT 130 MM HG: CPT

## 2024-07-15 PROCEDURE — 83525 ASSAY OF INSULIN: CPT

## 2024-07-15 PROCEDURE — 80061 LIPID PANEL: CPT

## 2024-07-15 PROCEDURE — 1125F AMNT PAIN NOTED PAIN PRSNT: CPT

## 2024-07-15 PROCEDURE — 82306 VITAMIN D 25 HYDROXY: CPT

## 2024-07-15 PROCEDURE — 84425 ASSAY OF VITAMIN B-1: CPT

## 2024-07-15 PROCEDURE — 83921 ORGANIC ACID SINGLE QUANT: CPT

## 2024-07-15 PROCEDURE — 84443 ASSAY THYROID STIM HORMONE: CPT

## 2024-07-15 PROCEDURE — 99214 OFFICE O/P EST MOD 30 MIN: CPT

## 2024-07-15 PROCEDURE — 36415 COLL VENOUS BLD VENIPUNCTURE: CPT

## 2024-07-15 PROCEDURE — 3079F DIAST BP 80-89 MM HG: CPT

## 2024-07-15 PROCEDURE — 83540 ASSAY OF IRON: CPT

## 2024-07-15 PROCEDURE — 85025 COMPLETE CBC W/AUTO DIFF WBC: CPT

## 2024-07-15 PROCEDURE — 1159F MED LIST DOCD IN RCRD: CPT

## 2024-07-15 PROCEDURE — 1160F RVW MEDS BY RX/DR IN RCRD: CPT

## 2024-07-15 PROCEDURE — 80053 COMPREHEN METABOLIC PANEL: CPT

## 2024-07-15 RX ORDER — LAMOTRIGINE 200 MG/1
200 TABLET ORAL 2 TIMES DAILY
Qty: 180 TABLET | Refills: 1 | Status: SHIPPED | OUTPATIENT
Start: 2024-07-15

## 2024-07-15 RX ORDER — ERYTHROMYCIN 5 MG/G
OINTMENT OPHTHALMIC NIGHTLY
Qty: 1 G | Refills: 0 | Status: SHIPPED | OUTPATIENT
Start: 2024-07-15

## 2024-07-15 RX ORDER — HYDROXYZINE 50 MG/1
50 TABLET, FILM COATED ORAL
COMMUNITY
End: 2024-07-15 | Stop reason: SDUPTHER

## 2024-07-15 NOTE — PROGRESS NOTES
Meghan Lui presents to [facility] who presents to clinic for 2-month follow-up.      History of Present Illness  This is a 34-year-old female who presents to clinic for 2-month follow-up.    Obesity: Patient has done really well since having gastric sleeve surgery done in April.  Is lost about 69 pounds, states that she is really happy with the progress and she is no longer having the adverse effects that she was shortly after her surgery.  States that she is done a lot better about being able to eat and drink different things, actually recently had upper scope performed by bariatric surgery, to make sure that there is nothing else they are contributing to patient's difficulty in eating or drinking, and states that he gave her Reglan which has made a big difference.  States that she is able to eat and drink appropriately.  Continues to try to watch her weight, and states that it is already helping with other chronic pain that she is experienced.    Patient states that she is also noticed that ever since having her upper scope done, her right eye has been very irritated on the bottom lid.  Patient states that anytime she messes with it, blinks certain ways or move certain ways, she feels a sharp pain in one of the areas.  Was want me to look into it as she feels like she may have something going on there is such as an infection or even possibly a stye.  Denies any drainage, no issues with dry eyes.  No other symptoms noted.  Does not typically wear contacts.    Patient would also like to discuss about coming off of her metformin, states that she does not feel like she needs it in the same way that she did prior to.  Was taking for PCOS and insulin resistance but now that she is lost 69 pounds, is wondering if that could be a medication she can come off of.    Patient need updated blood work, also has blood work pended via bariatric surgery.  Will order today.  Refuses other  "vaccines/immunizations but otherwise up-to-date on other preventative screenings.    The following portions of the patient's history were personally reviewed and updated as appropriate: allergies, current medications, past medical history, past surgical history, past family history, and past social history.       Objective   Vital Signs:   /82 (BP Location: Left arm, Patient Position: Sitting, Cuff Size: Adult)   Pulse 74   Temp 97.7 °F (36.5 °C)   Ht 185.4 cm (72.99\")   Wt 109 kg (240 lb 1.6 oz)   SpO2 98%   BMI 31.68 kg/m²     Body mass index is 31.68 kg/m².    All labs, imaging, test results, and specialty provider notes reviewed with patient.     Physical Exam  Vitals reviewed.   Constitutional:       Appearance: Normal appearance.   Cardiovascular:      Rate and Rhythm: Normal rate and regular rhythm.      Pulses: Normal pulses.      Heart sounds: Normal heart sounds.   Pulmonary:      Effort: Pulmonary effort is normal.      Breath sounds: Normal breath sounds.   Neurological:      General: No focal deficit present.      Mental Status: She is alert and oriented to person, place, and time.                               Assessment and Plan:  Diagnoses and all orders for this visit:    1. Folliculitis (Primary)  Comments:  Warm compresses to eye few times a day for 15 minutes, prescribe erythromycin ointment.  Orders:  -     erythromycin (ROMYCIN) 5 MG/GM ophthalmic ointment; Administer  to the right eye Every Night. For 7 days  Dispense: 1 g; Refill: 0    2. Primary hypertension  -     Comprehensive Metabolic Panel; Future    3. Screening for lipid disorders  -     Lipid Panel; Future    4. Insulin resistance  Comments:  Repeat level, likely discontinue metformin.  Orders:  -     Insulin, Total; Future    5. Hypothyroidism, unspecified type  -     TSH Rfx On Abnormal To Free T4; Future    Other orders  -     lamoTRIgine (LaMICtal) 200 MG tablet; Take 1 tablet by mouth 2 (Two) Times a Day.  Dispense: " 180 tablet; Refill: 1          Follow Up:  Return in about 6 months (around 1/15/2025).    Patient was given instructions and counseling regarding her condition or for health maintenance advice. Please see specific information pulled into the AVS if appropriate.

## 2024-07-16 DIAGNOSIS — E87.6 HYPOKALEMIA: Primary | ICD-10-CM

## 2024-07-16 LAB — INSULIN SERPL-ACNC: 6.4 UIU/ML (ref 2.6–24.9)

## 2024-07-16 RX ORDER — POTASSIUM CHLORIDE 20MEQ/15ML
10 LIQUID (ML) ORAL 2 TIMES DAILY
Qty: 250 ML | Refills: 0 | Status: SHIPPED | OUTPATIENT
Start: 2024-07-16

## 2024-07-17 LAB — VIT B1 BLD-SCNC: 105.4 NMOL/L (ref 66.5–200)

## 2024-07-18 LAB — METHYLMALONATE SERPL-SCNC: 54 NMOL/L (ref 0–378)

## 2024-07-22 ENCOUNTER — PREP FOR SURGERY (OUTPATIENT)
Dept: OTHER | Facility: HOSPITAL | Age: 34
End: 2024-07-22
Payer: COMMERCIAL

## 2024-07-22 DIAGNOSIS — R11.10 VOMITING: ICD-10-CM

## 2024-07-22 DIAGNOSIS — R13.10 DYSPHAGIA: Primary | ICD-10-CM

## 2024-07-22 DIAGNOSIS — Z98.84 S/P LAPAROSCOPIC SLEEVE GASTRECTOMY: ICD-10-CM

## 2024-07-22 RX ORDER — FOLIC ACID 1 MG/1
1 TABLET ORAL DAILY
Qty: 30 TABLET | Refills: 1 | Status: SHIPPED | OUTPATIENT
Start: 2024-07-22 | End: 2024-09-20

## 2024-07-22 RX ORDER — SODIUM CHLORIDE, SODIUM LACTATE, POTASSIUM CHLORIDE, CALCIUM CHLORIDE 600; 310; 30; 20 MG/100ML; MG/100ML; MG/100ML; MG/100ML
30 INJECTION, SOLUTION INTRAVENOUS CONTINUOUS
Status: CANCELLED | OUTPATIENT
Start: 2024-07-22

## 2024-07-23 ENCOUNTER — APPOINTMENT (OUTPATIENT)
Dept: GENERAL RADIOLOGY | Facility: HOSPITAL | Age: 34
End: 2024-07-23
Payer: COMMERCIAL

## 2024-07-23 ENCOUNTER — HOSPITAL ENCOUNTER (EMERGENCY)
Facility: HOSPITAL | Age: 34
Discharge: HOME | End: 2024-07-23
Attending: EMERGENCY MEDICINE
Payer: COMMERCIAL

## 2024-07-23 ENCOUNTER — PREP FOR SURGERY (OUTPATIENT)
Dept: OTHER | Facility: HOSPITAL | Age: 34
End: 2024-07-23
Payer: COMMERCIAL

## 2024-07-23 VITALS
OXYGEN SATURATION: 98 % | RESPIRATION RATE: 14 BRPM | HEART RATE: 56 BPM | TEMPERATURE: 97.5 F | BODY MASS INDEX: 32.31 KG/M2 | SYSTOLIC BLOOD PRESSURE: 102 MMHG | HEIGHT: 72 IN | DIASTOLIC BLOOD PRESSURE: 63 MMHG | WEIGHT: 238.54 LBS

## 2024-07-23 DIAGNOSIS — E87.6 HYPOKALEMIA: ICD-10-CM

## 2024-07-23 DIAGNOSIS — R42 DIZZINESS: ICD-10-CM

## 2024-07-23 DIAGNOSIS — R11.2 INTRACTABLE NAUSEA AND VOMITING: Primary | ICD-10-CM

## 2024-07-23 LAB
ALBUMIN SERPL-MCNC: 3.4 G/DL (ref 3.5–5.2)
ALBUMIN/GLOB SERPL: 1.6 G/DL
ALP SERPL-CCNC: 56 U/L (ref 39–117)
ALT SERPL W P-5'-P-CCNC: 8 U/L (ref 1–33)
ANION GAP SERPL CALCULATED.3IONS-SCNC: 15.1 MMOL/L (ref 5–15)
AST SERPL-CCNC: 9 U/L (ref 1–32)
BACTERIA UR QL AUTO: ABNORMAL /HPF
BASOPHILS # BLD AUTO: 0.08 10*3/MM3 (ref 0–0.2)
BASOPHILS NFR BLD AUTO: 1.2 % (ref 0–1.5)
BILIRUB SERPL-MCNC: 0.4 MG/DL (ref 0–1.2)
BILIRUB UR QL STRIP: NEGATIVE
BUN SERPL-MCNC: 5 MG/DL (ref 6–20)
BUN/CREAT SERPL: 7.5 (ref 7–25)
CALCIUM SPEC-SCNC: 8.6 MG/DL (ref 8.6–10.5)
CHLORIDE SERPL-SCNC: 104 MMOL/L (ref 98–107)
CLARITY UR: CLEAR
CO2 SERPL-SCNC: 21.9 MMOL/L (ref 22–29)
COLOR UR: YELLOW
CREAT SERPL-MCNC: 0.67 MG/DL (ref 0.57–1)
DEPRECATED RDW RBC AUTO: 47.7 FL (ref 37–54)
EGFRCR SERPLBLD CKD-EPI 2021: 117.8 ML/MIN/1.73
EOSINOPHIL # BLD AUTO: 0.05 10*3/MM3 (ref 0–0.4)
EOSINOPHIL NFR BLD AUTO: 0.7 % (ref 0.3–6.2)
ERYTHROCYTE [DISTWIDTH] IN BLOOD BY AUTOMATED COUNT: 14.6 % (ref 12.3–15.4)
GLOBULIN UR ELPH-MCNC: 2.1 GM/DL
GLUCOSE SERPL-MCNC: 68 MG/DL (ref 65–99)
GLUCOSE UR STRIP-MCNC: NEGATIVE MG/DL
HCG INTACT+B SERPL-ACNC: <0.5 MIU/ML
HCT VFR BLD AUTO: 39 % (ref 34–46.6)
HGB BLD-MCNC: 13.5 G/DL (ref 12–15.9)
HGB UR QL STRIP.AUTO: NEGATIVE
HOLD SPECIMEN: NORMAL
HOLD SPECIMEN: NORMAL
HYALINE CASTS UR QL AUTO: ABNORMAL /LPF
IMM GRANULOCYTES # BLD AUTO: 0.02 10*3/MM3 (ref 0–0.05)
IMM GRANULOCYTES NFR BLD AUTO: 0.3 % (ref 0–0.5)
KETONES UR QL STRIP: ABNORMAL
LEUKOCYTE ESTERASE UR QL STRIP.AUTO: NEGATIVE
LIPASE SERPL-CCNC: 36 U/L (ref 13–60)
LYMPHOCYTES # BLD AUTO: 1.5 10*3/MM3 (ref 0.7–3.1)
LYMPHOCYTES NFR BLD AUTO: 22.4 % (ref 19.6–45.3)
MAGNESIUM SERPL-MCNC: 1.7 MG/DL (ref 1.6–2.6)
MCH RBC QN AUTO: 31.3 PG (ref 26.6–33)
MCHC RBC AUTO-ENTMCNC: 34.6 G/DL (ref 31.5–35.7)
MCV RBC AUTO: 90.3 FL (ref 79–97)
MONOCYTES # BLD AUTO: 0.54 10*3/MM3 (ref 0.1–0.9)
MONOCYTES NFR BLD AUTO: 8 % (ref 5–12)
NEUTROPHILS NFR BLD AUTO: 4.52 10*3/MM3 (ref 1.7–7)
NEUTROPHILS NFR BLD AUTO: 67.4 % (ref 42.7–76)
NITRITE UR QL STRIP: NEGATIVE
NRBC BLD AUTO-RTO: 0 /100 WBC (ref 0–0.2)
PH UR STRIP.AUTO: 6.5 [PH] (ref 5–8)
PLATELET # BLD AUTO: 206 10*3/MM3 (ref 140–450)
PMV BLD AUTO: 10 FL (ref 6–12)
POTASSIUM SERPL-SCNC: 3.1 MMOL/L (ref 3.5–5.2)
PROT SERPL-MCNC: 5.5 G/DL (ref 6–8.5)
PROT UR QL STRIP: ABNORMAL
RBC # BLD AUTO: 4.32 10*6/MM3 (ref 3.77–5.28)
RBC # UR STRIP: ABNORMAL /HPF
REF LAB TEST METHOD: ABNORMAL
SODIUM SERPL-SCNC: 141 MMOL/L (ref 136–145)
SP GR UR STRIP: 1.02 (ref 1–1.03)
SQUAMOUS #/AREA URNS HPF: ABNORMAL /HPF
TSH SERPL DL<=0.05 MIU/L-ACNC: 0.07 UIU/ML (ref 0.27–4.2)
UROBILINOGEN UR QL STRIP: ABNORMAL
WBC # UR STRIP: ABNORMAL /HPF
WBC NRBC COR # BLD AUTO: 6.71 10*3/MM3 (ref 3.4–10.8)
WHOLE BLOOD HOLD COAG: NORMAL
WHOLE BLOOD HOLD SPECIMEN: NORMAL

## 2024-07-23 PROCEDURE — 99283 EMERGENCY DEPT VISIT LOW MDM: CPT

## 2024-07-23 PROCEDURE — 25810000003 LACTATED RINGERS SOLUTION: Performed by: EMERGENCY MEDICINE

## 2024-07-23 PROCEDURE — 80053 COMPREHEN METABOLIC PANEL: CPT | Performed by: EMERGENCY MEDICINE

## 2024-07-23 PROCEDURE — 74022 RADEX COMPL AQT ABD SERIES: CPT

## 2024-07-23 PROCEDURE — 85025 COMPLETE CBC W/AUTO DIFF WBC: CPT | Performed by: EMERGENCY MEDICINE

## 2024-07-23 PROCEDURE — 96367 TX/PROPH/DG ADDL SEQ IV INF: CPT

## 2024-07-23 PROCEDURE — 84702 CHORIONIC GONADOTROPIN TEST: CPT | Performed by: EMERGENCY MEDICINE

## 2024-07-23 PROCEDURE — 81001 URINALYSIS AUTO W/SCOPE: CPT | Performed by: EMERGENCY MEDICINE

## 2024-07-23 PROCEDURE — 96365 THER/PROPH/DIAG IV INF INIT: CPT

## 2024-07-23 PROCEDURE — 25010000002 FOSAPREPITANT PER 1 MG: Performed by: EMERGENCY MEDICINE

## 2024-07-23 PROCEDURE — 83735 ASSAY OF MAGNESIUM: CPT | Performed by: EMERGENCY MEDICINE

## 2024-07-23 PROCEDURE — 83690 ASSAY OF LIPASE: CPT | Performed by: EMERGENCY MEDICINE

## 2024-07-23 PROCEDURE — 84443 ASSAY THYROID STIM HORMONE: CPT | Performed by: EMERGENCY MEDICINE

## 2024-07-23 PROCEDURE — 25010000002 POTASSIUM CHLORIDE 10 MEQ/100ML SOLUTION: Performed by: EMERGENCY MEDICINE

## 2024-07-23 RX ORDER — LEVOTHYROXINE SODIUM 137 MCG
137 TABLET ORAL DAILY
Qty: 90 TABLET | Refills: 1 | Status: SHIPPED | OUTPATIENT
Start: 2024-07-23

## 2024-07-23 RX ORDER — SODIUM CHLORIDE 0.9 % (FLUSH) 0.9 %
10 SYRINGE (ML) INJECTION AS NEEDED
Status: DISCONTINUED | OUTPATIENT
Start: 2024-07-23 | End: 2024-07-23 | Stop reason: HOSPADM

## 2024-07-23 RX ORDER — POTASSIUM CHLORIDE 7.45 MG/ML
10 INJECTION INTRAVENOUS
Status: DISCONTINUED | OUTPATIENT
Start: 2024-07-23 | End: 2024-07-23 | Stop reason: HOSPADM

## 2024-07-23 RX ORDER — DOXEPIN 6 MG/1
TABLET, FILM COATED ORAL
COMMUNITY
End: 2024-07-26 | Stop reason: HOSPADM

## 2024-07-23 RX ADMIN — POTASSIUM CHLORIDE 10 MEQ: 7.46 INJECTION, SOLUTION INTRAVENOUS at 15:07

## 2024-07-23 RX ADMIN — SODIUM CHLORIDE, POTASSIUM CHLORIDE, SODIUM LACTATE AND CALCIUM CHLORIDE 2000 ML: 600; 310; 30; 20 INJECTION, SOLUTION INTRAVENOUS at 15:18

## 2024-07-23 RX ADMIN — SODIUM CHLORIDE 100 ML: 9 INJECTION, SOLUTION INTRAVENOUS at 16:09

## 2024-07-23 NOTE — DISCHARGE INSTRUCTIONS
Continue with your current home medications.  Take sips of fluid.  Attempt to stay hydrated.  Diet as tolerated.  Follow-up with Dr. Ferraro this week for outpatient diascopy.  Office will call you tomorrow with the date and time.  Return to the ER for any other concerns issues that may arise.    Additionally, follow-up with your primary care provider regarding your TSH level.  Your TSH level shows that you may be receiving too much Synthroid and this may need to be adjusted by your primary care provider.

## 2024-07-23 NOTE — ED PROVIDER NOTES
"Time: 4:18 PM EDT  Date of encounter:  7/23/2024  Independent Historian/Clinical History and Information was obtained by:   Patient  Chief Complaint: Dizziness and vomiting    History is limited by: N/A    History of Present Illness:  Patient is a 34 y.o. year old female who presents to the emergency department for evaluation of dizziness and vomiting.  Patient states that over the last month she has been having difficulty eating and drinking.  Patient is that she vomits every time she tries to eat or drink.  Patient is concerned for changes in her electrolytes.  Patient reports that last Monday she had labs checked and was told her potassium was \"critically low.\".  Patient also reports she has been lightheaded and dizzy which is exacerbated by change in positioning.  This resolved at rest.  Patient had a gastric sleeve procedure done on 4/24/2024.  Patient reports she had a scope with a dilation done on 6/18/2024 and for about 2 weeks she had no further vomiting.  Patient presents now for concerns for dehydration and electrolyte abnormalities.    HPI    Patient Care Team  Primary Care Provider: Dionna Valenzuela APRN    Past Medical History:     Allergies   Allergen Reactions    Buspirone Mental Status Change     Suicidal ideation    Remeron [Mirtazapine] Other (See Comments)     Suicidal ideation    Mushroom GI Intolerance     Nausea/vomiting     Past Medical History:   Diagnosis Date    Anemia april 2023    iron defiency anemia    Anxiety     Arthritis     Asthma     Back pain     Bipolar 2 disorder     Bladder problem     Cholelithiasis     Removed October 2011    Cluster headache 2011    Condition not found     HERNIATED DISC (UNSPECIFIED)     Cyst of skin     Depression     Difficulty walking     Foot pain, left 12/04/2019    Headache, tension-type 2005    Heel pain     Hidradenitis suppurativa     on med    HL (hearing loss)     Hx of blood diseases     Hx of degenerative disc disease     Hyperlipidemia     " Hypertension 2015    bloop pressure elevates when with migraines    Hypothyroidism     Ingrown toenail     Irritable bowel syndrome     Leg pain     Leg swelling     Lumbago     Memory loss     Migraines     Mood disorder     Muscle cramps     Neuromuscular disorder 2018    Numbness in feet     Obesity     Thyroid disease     Trouble swallowing     Visual impairment     Vomiting      Past Surgical History:   Procedure Laterality Date    ABDOMINAL SURGERY      BARIATRIC SURGERY  24     SECTION      , ,     CHOLECYSTECTOMY      DILATION AND CURETTAGE, DIAGNOSTIC / THERAPEUTIC      ENDOSCOPY      ENDOSCOPY N/A 2024    Procedure: ESOPHAGOGASTRODUODENOSCOPY WITH BALLOON DILATATION 18-20MM;  Surgeon: Steve Ferraro Jr., MD;  Location: [facility] ENDOSCOPY;  Service: General;  Laterality: N/A;  PRE- DYSPHAGIA, H/O SLEEVE  POST-GASTRITIS    GASTRIC SLEEVE LAPAROSCOPIC N/A 2024    Procedure: Sleeve gastrectomy LAPAROSCOPIC WITH PARAESPHAGEAL HERNIA REPAIR;  Surgeon: Steve Ferraro Jr., MD;  [place] [facility] [place] [facility] [place]  Service: Bariatric;  Laterality: N/A;    HERNIA REPAIR      WISDOM TOOTH EXTRACTION       Family History   Problem Relation Age of Onset    Diabetes Mother     Hypertension Mother     Vision loss Mother     Thyroid disease Mother     Obesity Mother     Migraines Mother     Cancer Father         Passed     Early death Father         cancer    Thyroid disease Father     Arthritis Sister     Obesity Maternal Aunt     Obesity Maternal Uncle     Arthritis Paternal Grandmother     Cancer Paternal Grandmother     Cancer Paternal Grandfather     COPD Paternal Grandfather     Malig Hyperthermia Neg Hx        Home Medications:  Prior to Admission medications    Medication Sig Start Date End Date Taking? Authorizing Provider   baclofen (LIORESAL) 10 MG tablet Take 1 tablet by mouth 3 (Three) Times a Day As Needed.    Provider, MD Violetta   Doxepin HCl 3 MG tablet  Take 3 mg by mouth Every Night.    Violetta Xiong MD   Doxepin HCl 6 MG tablet     Violetta Xiong MD   erythromycin (ROMYCIN) 5 MG/GM ophthalmic ointment Administer  to the right eye Every Night. For 7 days 7/15/24   Dionna Valenzuela APRN   folic acid (FOLVITE) 1 MG tablet Take 1 tablet by mouth Daily for 60 days. 7/22/24 9/20/24  Radhika Mendoza APRN   HYDROcodone-acetaminophen (NORCO) 7.5-325 MG per tablet Take 1 tablet by mouth 2 (Two) Times a Day As Needed. 6/2/23   Violetta Xiong MD   hydrOXYzine pamoate (VISTARIL) 50 MG capsule Take 1 capsule by mouth 3 (Three) Times a Day As Needed. 7/10/23   Violetta Xiong MD   Ibuprofen 3 %, Gabapentin 10 %, Baclofen 2 %, lidocaine 4 % Apply 1-2 g topically to the appropriate area as directed 3 (Three) to 4 (Four) times daily. 6/18/24 6/18/25  Antonio Rey MD   lamoTRIgine (LaMICtal) 200 MG tablet Take 1 tablet by mouth 2 (Two) Times a Day. 7/15/24   Dionna Valenzuela APRN   lidocaine (XYLOCAINE) 2 % jelly Apply  topically to the appropriate area as directed As Needed for Mild Pain. 7/6/23   Viviana Rg APRN   metoclopramide (Reglan) 10 MG tablet Take 1 tablet by mouth 4 (Four) Times a Day Before Meals & at Bedtime. 7/9/24   Steve Ferraro Jr., MD   mupirocin (BACTROBAN) 2 % cream Apply 1 Application topically to the appropriate area as directed 3 (Three) Times a Day. 3/13/24   Dionna Valenzuela APRN   pantoprazole (Protonix) 40 MG EC tablet Take 1 tablet by mouth Daily. 5/30/24   Radhika Mendoza APRN   potassium chloride (KAYCIEL) 20 mEq/15 mL solution Take 7.5 mL by mouth 2 (Two) Times a Day. For 14 days 7/16/24   Dionna Valenzuela APRN   sucralfate (Carafate) 1 g tablet Take 1 tablet by mouth 3 times a day. 6/18/24   Steve Ferraro Jr., MD   Synthroid 137 MCG tablet TAKE ONE TABLET BY MOUTH ONCE DAILY 7/23/24   Dionna Valenzuela APRN   levothyroxine (Synthroid) 137 MCG tablet Take 1 tablet by mouth Every  "Morning.  7/23/24  Provider, MD Violetta        Social History:   Social History     Tobacco Use    Smoking status: Former     Current packs/day: 0.00     Average packs/day: 0.5 packs/day for 16.0 years (8.0 ttl pk-yrs)     Types: Cigarettes     Start date: 6/22/2005     Quit date: 6/22/2021     Years since quitting: 3.0     Passive exposure: Past    Smokeless tobacco: Never    Tobacco comments:     VAPES EVERYDAY   smoked 1/2 pack a day quit  2021  had smoked for 16 years   Vaping Use    Vaping status: Every Day    Start date: 7/15/2021    Substances: Nicotine, Flavoring    Devices: Disposable    Passive vaping exposure: Yes   Substance Use Topics    Alcohol use: Not Currently     Comment: CURRENT SOME DAY (rare)    Drug use: Yes     Types: Marijuana     Comment: 1x weekly         Review of Systems:  Review of Systems   Constitutional:  Negative for chills and fever.   HENT:  Negative for congestion, ear pain and sore throat.    Eyes:  Negative for pain.   Respiratory:  Negative for cough, chest tightness and shortness of breath.    Cardiovascular:  Negative for chest pain.   Gastrointestinal:  Positive for nausea and vomiting. Negative for abdominal pain and diarrhea.   Genitourinary:  Negative for flank pain and hematuria.   Musculoskeletal:  Negative for joint swelling.   Skin:  Negative for color change, pallor and wound.   Neurological:  Positive for dizziness and light-headedness. Negative for seizures and headaches.   All other systems reviewed and are negative.       Physical Exam:  /63 (BP Location: Right arm, Patient Position: Sitting)   Pulse 56   Temp 97.5 °F (36.4 °C) (Oral)   Resp 14   Ht 185.4 cm (73\")   Wt 108 kg (238 lb 8.6 oz)   SpO2 98%   BMI 31.47 kg/m²     Physical Exam    Vital signs were reviewed under triage note.  General appearance - Patient appears well-developed and well-nourished.  Patient is in no acute distress.  Head - Normocephalic, atraumatic.  Pupils - Equal, " round, reactive to light.  Extraocular muscles are intact.  Conjunctiva is clear.  Nasal - Normal inspection.  No evidence of trauma or epistaxis.  Tympanic membranes - Gray, intact without erythema or retractions.  Oral mucosa - Pink and moist without lesions or erythema.  Uvula is midline.  Chest wall - Atraumatic.  Chest wall is nontender.  There are no vesicular rashes noted.  Neck - Supple.  Trachea was midline.  There is no palpable lymphadenopathy or thyromegaly.  There are no meningeal signs  Lungs - Clear to auscultation and percussion bilaterally.  Heart - Regular rate and rhythm without any murmurs, clicks, or gallops.  Abdomen - Soft.  Bowel sounds are present.  There is no palpable tenderness.  There is no rebound, guarding, or rigidity.  There are no palpable masses.  There are no pulsatile masses.  Back - Spine is straight and midline.  There is no CVA tenderness.  Extremities - Intact x4 with full range of motion.  There is no palpable edema.  Pulses are intact x4 and equal.  Neurologic - Patient is awake, alert, and oriented x3.  Cranial nerves II through XII are grossly intact.  Motor and sensory functions grossly intact.  Cerebellar function was normal.  Integument - There There are no petechia or purpura lesions noted.  There are no vesicular lesions noted.          Procedures:  Procedures      Medical Decision Making:      Comorbidities that affect care:    Hypothyroidism, migraines, depression, IBS, hypertension, hyperlipidemia, bipolar disorder    External Notes reviewed:    Previous Clinic Note: Office visit with DARINEL Soriano on 7/15/2024 was reviewed by me.      The following orders were placed and all results were independently analyzed by me:  Orders Placed This Encounter   Procedures    XR Abdomen 2+ VW with Chest 1 VW    [place] Draw    Comprehensive Metabolic Panel    Lipase    Urinalysis With Microscopic If Indicated (No Culture) - Urine, Clean Catch    hCG, Quantitative,  Pregnancy    CBC Auto Differential    Magnesium    TSH    Urinalysis, Microscopic Only - Urine, Clean Catch    Undress & Gown    Orthostatic Vitals (Blood Pressure & Heart Rate)    Orthostatic Vitals    Monitor Blood Pressure    CBC & Differential    Green Top (Gel)    Lavender Top    Gold Top - SST    Light Blue Top       Medications Given in the Emergency Department:  Medications   lactated ringers bolus 2,000 mL (0 mL Intravenous Stopped 7/23/24 1655)   FOSAPREPITANT 150 MG/100ML NORMAL SALINE (CBC) IVPB 100 mL 100 mL (0 mL Intravenous Stopped 7/23/24 1639)        ED Course:     The patient was seen and evaluated in the ED by me.  The above history and physical examination was performed as documented.  Diagnostic data was obtained.  Results reviewed.  Patient's potassium was low but not critical.  Potassium was replaced.  Patient was given 2 bags of IV fluids.  I did discuss case with Dr. Ferraro.  He is aware of the patient's condition.  He will arrange for EGD with pyloric dilatation for this week.  The patient is agreeable to his treatment plan.  The patient several discharge home.    Labs:    Lab Results (last 24 hours)       ** No results found for the last 24 hours. **             Imaging:    No Radiology Exams Resulted Within Past 24 Hours      Differential Diagnosis and Discussion:    Vomiting: Differential diagnosis includes but is not limited to migraine, labyrinthine disorders, psychogenic, metabolic and endocrine causes, peptic ulcer, gastric outlet obstruction, gastritis, gastroenteritis, appendicitis, intestinal obstruction, paralytic ileus, food poisoning, cholecystitis, acute hepatitis, acute pancreatitis, acute febrile illness, and myocardial infarction.    All labs were reviewed and interpreted by me.  All X-rays impressions were independently interpreted by me.    MDM     Amount and/or Complexity of Data Reviewed  Clinical lab tests: reviewed  Tests in the radiology section of CPT®: reviewed              Patient Care Considerations:    CT ABDOMEN AND PELVIS: I considered ordering a CT scan of the abdomen and pelvis however the patient has no acute peritoneal findings on physical examination.      Consultants/Shared Management Plan:    Consultant: I have discussed the case with Dr. Ferraro who states for follow-up this week to be seen and undergo an EGD with pyloric dilatation.    Social Determinants of Health:    Patient is independent, reliable, and has access to care.       Disposition and Care Coordination:    Discharged: I considered escalation of care by admitting this patient to the hospital, however after completing the workup there were no acute findings to warrant inpatient admission.    I have explained the patient´s condition, diagnoses and treatment plan based on the information available to me at this time. I have answered questions and addressed any concerns. The patient has a good  understanding of the patient´s diagnosis, condition, and treatment plan as can be expected at this point. The vital signs have been stable. The patient´s condition is stable and appropriate for discharge from the emergency department.      The patient will pursue further outpatient evaluation with the primary care physician or other designated or consulting physician as outlined in the discharge instructions. They are agreeable to this plan of care and follow-up instructions have been explained in detail. The patient has received these instructions in written format and has expressed an understanding of the discharge instructions. The patient is aware that any significant change in condition or worsening of symptoms should prompt an immediate return to this or the closest emergency department or call to 911.    Final diagnoses:   Intractable nausea and vomiting   Dizziness   Hypokalemia        ED Disposition       ED Disposition   Discharge    Condition   Stable    Comment   --               This medical record  created using voice recognition software.             Quoc Lynn DO  07/26/24 1447

## 2024-07-24 PROBLEM — R11.10 VOMITING: Status: ACTIVE | Noted: 2024-07-22

## 2024-07-26 ENCOUNTER — ANESTHESIA EVENT (OUTPATIENT)
Dept: GASTROENTEROLOGY | Facility: HOSPITAL | Age: 34
End: 2024-07-26
Payer: COMMERCIAL

## 2024-07-26 ENCOUNTER — ANESTHESIA (OUTPATIENT)
Dept: GASTROENTEROLOGY | Facility: HOSPITAL | Age: 34
End: 2024-07-26
Payer: COMMERCIAL

## 2024-07-26 ENCOUNTER — HOSPITAL ENCOUNTER (OUTPATIENT)
Facility: HOSPITAL | Age: 34
Setting detail: HOSPITAL OUTPATIENT SURGERY
Discharge: HOME | End: 2024-07-26
Attending: SURGERY | Admitting: SURGERY
Payer: COMMERCIAL

## 2024-07-26 VITALS
HEART RATE: 58 BPM | BODY MASS INDEX: 32.32 KG/M2 | SYSTOLIC BLOOD PRESSURE: 118 MMHG | WEIGHT: 238.6 LBS | HEIGHT: 72 IN | DIASTOLIC BLOOD PRESSURE: 69 MMHG | RESPIRATION RATE: 19 BRPM | OXYGEN SATURATION: 100 %

## 2024-07-26 DIAGNOSIS — Z98.84 S/P LAPAROSCOPIC SLEEVE GASTRECTOMY: ICD-10-CM

## 2024-07-26 DIAGNOSIS — R13.10 DYSPHAGIA: ICD-10-CM

## 2024-07-26 DIAGNOSIS — R11.10 VOMITING: ICD-10-CM

## 2024-07-26 PROCEDURE — 25810000003 LACTATED RINGERS PER 1000 ML: Performed by: NURSE PRACTITIONER

## 2024-07-26 PROCEDURE — S0260 H&P FOR SURGERY: HCPCS | Performed by: SURGERY

## 2024-07-26 PROCEDURE — 25010000002 PROPOFOL 200 MG/20ML EMULSION: Performed by: NURSE ANESTHETIST, CERTIFIED REGISTERED

## 2024-07-26 PROCEDURE — 25810000003 LACTATED RINGERS PER 1000 ML: Performed by: SURGERY

## 2024-07-26 PROCEDURE — C1726 CATH, BAL DIL, NON-VASCULAR: HCPCS | Performed by: SURGERY

## 2024-07-26 PROCEDURE — 43245 EGD DILATE STRICTURE: CPT | Performed by: SURGERY

## 2024-07-26 RX ORDER — SODIUM CHLORIDE, SODIUM LACTATE, POTASSIUM CHLORIDE, CALCIUM CHLORIDE 600; 310; 30; 20 MG/100ML; MG/100ML; MG/100ML; MG/100ML
30 INJECTION, SOLUTION INTRAVENOUS CONTINUOUS
Status: DISCONTINUED | OUTPATIENT
Start: 2024-07-26 | End: 2024-07-26 | Stop reason: HOSPADM

## 2024-07-26 RX ORDER — PROPOFOL 10 MG/ML
INJECTION, EMULSION INTRAVENOUS AS NEEDED
Status: DISCONTINUED | OUTPATIENT
Start: 2024-07-26 | End: 2024-07-26 | Stop reason: SURG

## 2024-07-26 RX ORDER — SODIUM CHLORIDE, SODIUM LACTATE, POTASSIUM CHLORIDE, CALCIUM CHLORIDE 600; 310; 30; 20 MG/100ML; MG/100ML; MG/100ML; MG/100ML
30 INJECTION, SOLUTION INTRAVENOUS CONTINUOUS PRN
Status: DISCONTINUED | OUTPATIENT
Start: 2024-07-26 | End: 2024-07-26 | Stop reason: HOSPADM

## 2024-07-26 RX ORDER — LIDOCAINE HYDROCHLORIDE 20 MG/ML
INJECTION, SOLUTION EPIDURAL; INFILTRATION; INTRACAUDAL; PERINEURAL AS NEEDED
Status: DISCONTINUED | OUTPATIENT
Start: 2024-07-26 | End: 2024-07-26 | Stop reason: SURG

## 2024-07-26 RX ADMIN — PROPOFOL 100 MG: 10 INJECTION, EMULSION INTRAVENOUS at 07:00

## 2024-07-26 RX ADMIN — PROPOFOL 200 MCG/KG/MIN: 10 INJECTION, EMULSION INTRAVENOUS at 07:02

## 2024-07-26 RX ADMIN — SODIUM CHLORIDE, POTASSIUM CHLORIDE, SODIUM LACTATE AND CALCIUM CHLORIDE 30 ML/HR: 600; 310; 30; 20 INJECTION, SOLUTION INTRAVENOUS at 06:38

## 2024-07-26 RX ADMIN — LIDOCAINE HYDROCHLORIDE 100 MG: 20 INJECTION, SOLUTION EPIDURAL; INFILTRATION; INTRACAUDAL; PERINEURAL at 07:00

## 2024-07-26 RX ADMIN — SODIUM CHLORIDE, POTASSIUM CHLORIDE, SODIUM LACTATE AND CALCIUM CHLORIDE: 600; 310; 30; 20 INJECTION, SOLUTION INTRAVENOUS at 06:57

## 2024-07-26 RX ADMIN — PROPOFOL 50 MG: 10 INJECTION, EMULSION INTRAVENOUS at 07:01

## 2024-07-26 NOTE — ANESTHESIA PREPROCEDURE EVALUATION
Anesthesia Evaluation     NPO Solid Status: > 8 hours             Airway   Mallampati: II  No difficulty expected  Comment: Grade 1 view reported previously  Dental - normal exam   (+) partials        Pulmonary    (-) wheezes  Cardiovascular     Rhythm: regular    (+) hypertension, hyperlipidemia      Neuro/Psych  GI/Hepatic/Renal/Endo    (+) morbid obesity, hiatal hernia, thyroid problem     ROS Comment: BMI 31    Musculoskeletal     Abdominal    Substance History      OB/GYN          Other                    Anesthesia Plan    ASA 3     MAC     (D/W pt. MAC and possible awareness intra op.  Pt understands MAC and GA are not the same and the possibility of GA being required for failed MAC)  intravenous induction     Anesthetic plan, risks, benefits, and alternatives have been provided, discussed and informed consent has been obtained with: patient.    CODE STATUS:

## 2024-07-26 NOTE — H&P
[facility]  [address]  [facility] [id number] [phone]  [address]  [facility] [address] [phone]  Dept: [phone]  5/30/2024        Meghan Lui.  [id number]  [id number]  1990  female             Chief Complaint   Patient presents with    Post-op       1 mo PO sleeve         BH Post-Op Bariatric Surgery:   Meghan Lui is status post Laparoscopic Sleeve procedure, performed on 4/24/24      HPI:         Today's weight is 120 kg (265 lb) pounds, today's BMI is Body mass index is 36.29 kg/m²., she has a loss of 19 pounds since the last visit and her weight loss since surgery is 44 pounds. The patient reports a decreased portion size and loss of appetite.     Meghan Lui reports tolerating her dietary progression until this last week and is now having belching and burping with any liquids which she reports are painful. She was previously tolerating soft foods and shakes but is not only tolerating small amounts of clear liquids but is falling short of her goal and not getting much protein in. She reports vomiting with 2 bites of yogurt yesterday. She reports dizziness with position changes, dry mouth.      Diet and Exercise: Diet history reviewed and discussed with the patient. Weight loss/gains to date discussed with the patient. The patient states they are eating 60 grams of protein per day. She reports eating 3 meals per day, a typical portion size of 1/3 cup, eating 0-1 snacks per day, drinking 5-6 or more 8-oz. glasses of water per day, no carbonated beverage consumption and exercising regularly- walking 5 days per week     Supplements: flinstones MTV with iron.      Review of Systems   Constitutional:  Positive for appetite change and fatigue. Negative for unexpected weight change.   HENT: Negative.     Eyes: Negative.    Respiratory: Negative.     Cardiovascular: Negative.  Negative for  leg swelling.   Gastrointestinal:  Positive for constipation, nausea and vomiting. Negative for abdominal distention, abdominal pain and diarrhea.   Genitourinary:  Negative for difficulty urinating, frequency and urgency.   Musculoskeletal:  Negative for back pain.   Skin: Negative.    Psychiatric/Behavioral: Negative.     All other systems reviewed and are negative.     Problem List       Patient Active Problem List   Diagnosis    Chondromalacia, bilateral knee    Intractable chronic migraine without aura and without status migrainosus    Insomnia    DDD (degenerative disc disease), lumbar    Neuropathy    Primary hypertension    Recurrent major depressive disorder, in partial remission    Hashimoto's thyroiditis    Irritable bowel syndrome with both constipation and diarrhea    Mood disorder    Migraines    Lumbar radiculopathy    Hypothyroidism    Arthritis    Ankle edema    Hyperlipemia    Overflow stress urinary incontinence in female    Heavy period    Multiple joint pain    Balance disorder    Depression with anxiety    Bipolar 2 disorder    Hidradenitis    PCOS (polycystic ovarian syndrome)    Witnessed apneic spells    Sleep-related bruxism    Restless legs syndrome (RLS)    Hypersomnia    Suspected sleep apnea    Snoring    Hidradenitis suppurativa of anus    Paraesophageal hernia    Osteoarthritis of patellofemoral joints of both knees    Obesity, Class II, BMI 35-39.9    Drug-induced myoclonus    Hyponatremia    History of bariatric surgery            Medical History        Past Medical History:   Diagnosis Date    Anemia april 2023     iron defiency anemia    Anxiety      Arthritis      Asthma      Back pain      Bipolar 2 disorder      Bladder problem      Cholelithiasis       Removed October 2011    Cluster headache 2011    Condition not found       HERNIATED DISC (UNSPECIFIED)     Cyst of skin      Depression      Difficulty walking      Foot pain, left 12/04/2019    Headache, tension-type 2005     Heel pain      Hidradenitis suppurativa       on med    HL (hearing loss)      Hx of blood diseases      Hx of degenerative disc disease      Hyperlipidemia      Hypertension 2015     bloop pressure elevates when with migraines    Hypothyroidism      Ingrown toenail      Irritable bowel syndrome      Leg pain      Leg swelling      Lumbago      Memory loss      Migraines      Mood disorder      Muscle cramps      Neuromuscular disorder 2018    Numbness in feet      Obesity      Thyroid disease      Visual impairment              The following portions of the patient's history were reviewed and updated as appropriate: allergies, current medications, past family history, past medical history, past social history, past surgical history, and problem list.         Vitals:     05/30/24 1147   BP: 136/84   Pulse: 78   Temp: 98.1 °F (36.7 °C)         Physical Exam  Vitals and nursing note reviewed.   Constitutional:       Appearance: She is well-developed.   Neck:      Thyroid: No thyromegaly.   Cardiovascular:      Rate and Rhythm: Normal rate.   Pulmonary:      Effort: Pulmonary effort is normal. No respiratory distress.   Abdominal:      Palpations: Abdomen is soft.   Musculoskeletal:         General: No tenderness.   Skin:     General: Skin is warm and dry.   Neurological:      Mental Status: She is alert.   Psychiatric:         Behavior: Behavior normal.      Assessment:   Post-op, the patient is having dysphagia with solids and now liquids.  Will proceed with upper endoscopy with dilatation of the pylorus.  The risks and benefits of the procedure were discussed with the patient in detail and all questions were answered.  Possibility of perforation, bleeding, aspiration, anoxic brain injury, respiratory and/or cardiac arrest and death were discussed.  Consent will be signed and witnessed.          Encounter Diagnoses   Name Primary?    Obesity, Class II, BMI 35-39.9 Yes    PCOS (polycystic ovarian syndrome)       Primary hypertension      Paraesophageal hernia      Depression with anxiety      Recurrent major depressive disorder, in partial remission      History of bariatric surgery      Suspected sleep apnea           Plan:   Will plan on outpatient IV fluids with thiamine. Start pantoprazole and reglan. Will take a one day break from oral intake after getting fluids and will call if symptoms aren't resolving with treatment will consider EGD.  Encouraged patient to be sure to get plenty of lean protein per day through small frequent meals all with a protein source.   Activity restrictions: none.   Recommended patient be sure to get at least 70 grams of protein per day by eating small, frequent meals all with high lean protein choices. Be sure to limit/cut back on daily carbohydrate intake. Discussed with the patient the recommended amount of water per day to intake- half of body weight in ounces. Reviewed vitamin requirements. Be sure to do routine exercise, 150 minutes per week minimum, including both cardio and strength training.      Instructions / Recommendations: dietary counseling recommended, recommended a daily protein intake of  grams, vitamin supplement(s) recommended, recommended exercising at least 150 minutes per week, behavior modifications recommended and instructed to call the office for concerns, questions, or problems.

## 2024-07-26 NOTE — DISCHARGE INSTRUCTIONS
For the next 24 hours patient needs to be with a responsible adult.    For 24 hours DO NOT drive, operate machinery, appliances, drink alcohol, make important decisions or sign legal documents.    Start with a light or bland diet if you are feeling sick to your stomach otherwise advance to regular diet as tolerated.    Follow recommendations on procedure report if provided by your doctor.    Call Dr Ferraro for problems [phone]    Problems may include but not limited to: large amounts of bleeding, trouble breathing, repeated vomiting, severe unrelieved pain, fever or chills.

## 2024-07-26 NOTE — OP NOTE
Surgeon: Steve Ferraro Jr., M.D.    Preoperative Diagnosis: #1 dysphagia #2 status post sleeve gastrectomy and paraesophageal hernia repair    Postoperative Diagnosis: Same    Procedure Performed: Transoral esophagogastroduodenoscopy with 18-20 balloon dilatation of pylorus    Indications: 34-year-old female status post sleeve gastrectomy paraesophageal hernia repair April 2024 with complaints of dysphagia with solids and now liquids.  Patient underwent dilatation approximately 1 month ago and did very well until about 1 week and started having similar symptoms.    Procedure:     The procedure, indications, preparation and potential, patient were explained to the patient, who indicated understanding and signed the corresponding consent forms.  The patient was identified, taken to the endoscopy suite, and placed on the left side down decubitus position.  The patient underwent a MAC anesthesia and was appropriately monitored through the case by the anesthesia personnel using continuous pulse oximetry, blood pressure, and cardiac monitoring.  A bite block was placed.  After adequate IV sedation and using a transoral technique a lubed flexible endoscope was placed in the hypopharynx and advanced to the second portion of the duodenum.  The pylorus was mildly strictured and the scope had to be advanced with some pressure into the duodenum.  The scope was then withdrawn back into the gastric sleeve.  There was no stricture noted in the gastric sleeve unless dictated below.  No polyps or ulcers were seen unless dictated below.  The scope was then withdrawn back into the esophagus.  The Z line was regular and no erosive esophagitis seen unless dictated below.  Scope was then advanced back down into the antrum.  A 18-20 balloon catheter was then advanced across the pylorus and taken up in sequence and each level held for approximately 1 minute.  The catheter was deflated and removed.  The pylorus dilated up nicely.  The  scope was then completely withdrawn after decompressing the stomach.  The patient tolerated the procedure well and left the endoscopy suite in stable condition.    The sleeve was of normal size without stricture or dilatation.  The pylorus was very snug but opened up nicely after dilatation with the balloon.  The balloon was kept inflated and pulled back across the incisura without difficulty.  The Z-line was regular and no erosive esophagitis noted.    Recommendations:     Continue with current medical regimen as well follow-up in the office as scheduled.

## 2024-07-26 NOTE — ANESTHESIA POSTPROCEDURE EVALUATION
"Patient: Meghan Lui    Procedure Summary       Date: 07/26/24 Room / Location:  [facility]    Anesthesia Start: 0657 Anesthesia Stop: 0707    Procedure: ESOPHAGOGASTRODUODENOSCOPY WITH 18-20mm balloon DILATATION (Esophagus) Diagnosis:       Dysphagia      Vomiting      S/P laparoscopic sleeve gastrectomy      (Dysphagia [R13.10])      (Vomiting [R11.10])      (S/P laparoscopic sleeve gastrectomy [Z98.84])    Surgeons: Steve Ferraro Jr., MD Provider: Reddy Burnett MD    Anesthesia Type: MAC ASA Status: 3            Anesthesia Type: MAC    Vitals  Vitals Value Taken Time   /69 07/26/24 0727   Temp     Pulse 66 07/26/24 0728   Resp 19 07/26/24 0727   SpO2 90 % 07/26/24 0728   Vitals shown include unfiled device data.        Post Anesthesia Care and Evaluation    Patient location during evaluation: bedside  Patient participation: complete - patient participated  Level of consciousness: awake and alert  Pain management: adequate    Airway patency: patent  Anesthetic complications: No anesthetic complications    Cardiovascular status: acceptable  Respiratory status: acceptable  Hydration status: acceptable    Comments: /69 (BP Location: Right arm, Patient Position: Lying)   Pulse 58   Resp 19   Ht 185.4 cm (73\")   Wt 108 kg (238 lb 9.6 oz)   LMP 07/05/2024 (Exact Date)   SpO2 100%   BMI 31.48 kg/m²     "

## 2024-07-30 ENCOUNTER — OFFICE VISIT (OUTPATIENT)
Dept: BARIATRICS/WEIGHT MGMT | Facility: CLINIC | Age: 34
End: 2024-07-30
Payer: COMMERCIAL

## 2024-07-30 VITALS
DIASTOLIC BLOOD PRESSURE: 80 MMHG | SYSTOLIC BLOOD PRESSURE: 117 MMHG | HEART RATE: 65 BPM | BODY MASS INDEX: 32.51 KG/M2 | WEIGHT: 240 LBS | HEIGHT: 72 IN | TEMPERATURE: 98.3 F

## 2024-07-30 DIAGNOSIS — R11.2 NAUSEA AND VOMITING, UNSPECIFIED VOMITING TYPE: ICD-10-CM

## 2024-07-30 DIAGNOSIS — F31.81 BIPOLAR 2 DISORDER: ICD-10-CM

## 2024-07-30 DIAGNOSIS — Z98.84 S/P LAPAROSCOPIC SLEEVE GASTRECTOMY: ICD-10-CM

## 2024-07-30 DIAGNOSIS — R29.818 SUSPECTED SLEEP APNEA: ICD-10-CM

## 2024-07-30 DIAGNOSIS — F41.8 DEPRESSION WITH ANXIETY: ICD-10-CM

## 2024-07-30 DIAGNOSIS — E66.9 OBESITY, CLASS I, BMI 30-34.9: Primary | ICD-10-CM

## 2024-07-30 DIAGNOSIS — M25.473 ANKLE EDEMA: ICD-10-CM

## 2024-07-30 DIAGNOSIS — M25.50 MULTIPLE JOINT PAIN: ICD-10-CM

## 2024-07-30 DIAGNOSIS — I10 PRIMARY HYPERTENSION: ICD-10-CM

## 2024-07-30 PROBLEM — R13.10 DYSPHAGIA: Status: RESOLVED | Noted: 2024-06-04 | Resolved: 2024-07-30

## 2024-07-30 PROBLEM — E66.811 OBESITY, CLASS I, BMI 30-34.9: Status: ACTIVE | Noted: 2024-05-01

## 2024-07-30 PROBLEM — E66.812 OBESITY, CLASS II, BMI 35-39.9: Status: RESOLVED | Noted: 2024-05-01 | Resolved: 2024-07-30

## 2024-07-30 PROBLEM — R11.10 VOMITING: Status: RESOLVED | Noted: 2024-07-22 | Resolved: 2024-07-30

## 2024-07-30 NOTE — PROGRESS NOTES
[facility]  [address]  [facility] [id number] [phone]  [address]  [facility] [id number] [phone]  Dept: [phone]  7/30/2024      Meghan Lui.  [id number]  [id number]  1990  female      Chief Complaint   Patient presents with    Follow-up     3 month follow up sleeve       BH Post-Op Bariatric Surgery:   Meghan Lui is status post Laparoscopic Sleeve/PEH procedure, performed on 4/24/24 who has undergone EGD with dilitation on 6/18/24 and again on 7/26/24 due to dysphagia and intractable vomiting which resulted in marked hypokalemia for which she received IV potassium runs in the ER for on 7/23/24. She does report that she is doing much better with toleration of food and staying hydrated after the latter dilitation.    HPI:       Today's weight is 109 kg (240 lb) pounds, today's BMI is Body mass index is 32.86 kg/m²., she has a loss of 25 pounds since the last visit and her weight loss since surgery is 69 pounds. The patient reports a decreased portion size and loss of appetite.    Meghan Lui denies nausea, vomiting, reflux and reports tolerating 3 bites of tuna 4 bites of baked chicken breast. She is not doing protein shakes or supplements as they historically have not been sitting well.      Diet and Exercise: Diet history reviewed and discussed with the patient. Weight loss/gains to date discussed with the patient. The patient states they are eating 40 grams of protein per day. She reports eating 2 meals per day, a typical portion size of 1/3 cup, eating 1 snacks per day, drinking 4-5 or more 8-oz. glasses of water per day, no carbonated beverage consumption and exercising regularly- walking 5 days per week    Supplements: flintstones MTV with iron and calcium, potassium chloride.     Review of Systems   Constitutional:  Positive for appetite change. Negative for fatigue and  unexpected weight change.   HENT: Negative.     Eyes: Negative.    Respiratory: Negative.     Cardiovascular: Negative.  Negative for leg swelling.   Gastrointestinal:  Negative for abdominal distention, abdominal pain, constipation and diarrhea.   Genitourinary:  Negative for difficulty urinating, frequency and urgency.   Musculoskeletal:  Negative for back pain.   Skin: Negative.    Psychiatric/Behavioral: Negative.     All other systems reviewed and are negative.      Patient Active Problem List   Diagnosis    Chondromalacia, bilateral knee    Intractable chronic migraine without aura and without status migrainosus    Insomnia    DDD (degenerative disc disease), lumbar    Neuropathy    Primary hypertension    Recurrent major depressive disorder, in partial remission    Hashimoto's thyroiditis    Irritable bowel syndrome with both constipation and diarrhea    Mood disorder    Migraines    Lumbar radiculopathy    Hypothyroidism    Arthritis    Ankle edema    Hyperlipemia    Overflow stress urinary incontinence in female    Heavy period    Multiple joint pain    Balance disorder    Depression with anxiety    Bipolar 2 disorder    Hidradenitis    PCOS (polycystic ovarian syndrome)    Witnessed apneic spells    Sleep-related bruxism    Restless legs syndrome (RLS)    Hypersomnia    Suspected sleep apnea    Snoring    Hidradenitis suppurativa of anus    Paraesophageal hernia    Osteoarthritis of patellofemoral joints of both knees    Obesity, Class I, BMI 30-34.9    Drug-induced myoclonus    Hyponatremia    Nausea & vomiting    Dizziness    S/P laparoscopic sleeve gastrectomy       Past Medical History:   Diagnosis Date    Anemia april 2023    iron defiency anemia    Anxiety     Arthritis     Asthma     Back pain     Bipolar 2 disorder     Bladder problem     Cholelithiasis     Removed October 2011    Cluster headache 2011    Condition not found     HERNIATED DISC (UNSPECIFIED)     Cyst of skin     Depression      Difficulty walking     Foot pain, left 12/04/2019    Headache, tension-type 2005    Heel pain     Hidradenitis suppurativa     on med    HL (hearing loss)     Hx of blood diseases     Hx of degenerative disc disease     Hyperlipidemia     Hypertension 2015    bloop pressure elevates when with migraines    Hypothyroidism     Ingrown toenail     Irritable bowel syndrome     Leg pain     Leg swelling     Lumbago     Memory loss     Migraines     Mood disorder     Muscle cramps     Neuromuscular disorder 2018    Numbness in feet     Obesity     Thyroid disease     Trouble swallowing     Visual impairment     Vomiting        The following portions of the patient's history were reviewed and updated as appropriate: allergies, current medications, past family history, past medical history, past social history, past surgical history, and problem list.    Vitals:    07/30/24 1032   BP: 117/80   Pulse: 65   Temp: 98.3 °F (36.8 °C)       Physical Exam  Vitals and nursing note reviewed.   Constitutional:       Appearance: She is well-developed.   Neck:      Thyroid: No thyromegaly.   Cardiovascular:      Rate and Rhythm: Normal rate.   Pulmonary:      Effort: Pulmonary effort is normal. No respiratory distress.   Abdominal:      Palpations: Abdomen is soft.   Musculoskeletal:         General: No tenderness.   Skin:     General: Skin is warm and dry.   Neurological:      Mental Status: She is alert.   Psychiatric:         Behavior: Behavior normal.         Assessment:   Post-op, the patient is doing much better. Intake has improved markedly. Still falling a bit short of protein and fluid intake goals.     Encounter Diagnoses   Name Primary?    Obesity, Class I, BMI 30-34.9 Yes    S/P laparoscopic sleeve gastrectomy     Primary hypertension     Nausea and vomiting, unspecified vomiting type     Depression with anxiety     Bipolar 2 disorder     Suspected sleep apnea     Multiple joint pain     Ankle edema        Plan:   Advised to  start working in 1-2 unflavored whey protein supplements daily to improve energy levels and support replacement of muscle mass. Continue increasing fluid intake as needed. Discussed possible future EGD with further dilatation if needed of if symptoms progress in the future. Continue oral potassium. Will recheck levels in about 2 weeks.   Encouraged patient to be sure to get plenty of lean protein per day through small frequent meals all with a protein source.   Activity restrictions: none.   Recommended patient be sure to get at least 70 grams of protein per day by eating small, frequent meals all with high lean protein choices. Be sure to limit/cut back on daily carbohydrate intake. Discussed with the patient the recommended amount of water per day to intake- half of body weight in ounces. Reviewed vitamin requirements. Be sure to do routine exercise, 150 minutes per week minimum, including both cardio and strength training.     Instructions / Recommendations: dietary counseling recommended, recommended a daily protein intake of  grams, vitamin supplement(s) recommended, recommended exercising at least 150 minutes per week, behavior modifications recommended and instructed to call the office for concerns, questions, or problems.     The patient was instructed to follow up in 3 months .     . Total time spent during this encounter today was 35 minutes

## 2024-08-27 RX ORDER — PANTOPRAZOLE SODIUM 40 MG/1
40 TABLET, DELAYED RELEASE ORAL DAILY
Qty: 90 TABLET | Refills: 0 | Status: SHIPPED | OUTPATIENT
Start: 2024-08-27

## 2024-08-29 ENCOUNTER — CLINICAL SUPPORT (OUTPATIENT)
Dept: FAMILY MEDICINE CLINIC | Facility: CLINIC | Age: 34
End: 2024-08-29
Payer: COMMERCIAL

## 2024-08-29 DIAGNOSIS — N30.90 CYSTITIS: Primary | ICD-10-CM

## 2024-08-29 LAB
BILIRUB BLD-MCNC: ABNORMAL MG/DL
CLARITY, POC: CLEAR
COLOR UR: ABNORMAL
EXPIRATION DATE: ABNORMAL
GLUCOSE UR STRIP-MCNC: NEGATIVE MG/DL
KETONES UR QL: ABNORMAL
LEUKOCYTE EST, POC: NEGATIVE
Lab: ABNORMAL
NITRITE UR-MCNC: NEGATIVE MG/ML
PH UR: 7 [PH] (ref 5–8)
PROT UR STRIP-MCNC: NEGATIVE MG/DL
RBC # UR STRIP: NEGATIVE /UL
SP GR UR: 1.02 (ref 1–1.03)
UROBILINOGEN UR QL: NORMAL

## 2024-08-29 PROCEDURE — 81003 URINALYSIS AUTO W/O SCOPE: CPT

## 2024-08-29 PROCEDURE — 87086 URINE CULTURE/COLONY COUNT: CPT

## 2024-08-29 RX ORDER — NITROFURANTOIN 25; 75 MG/1; MG/1
100 CAPSULE ORAL 2 TIMES DAILY
Qty: 14 CAPSULE | Refills: 0 | Status: SHIPPED | OUTPATIENT
Start: 2024-08-29

## 2024-08-30 LAB — BACTERIA SPEC AEROBE CULT: NO GROWTH

## 2024-09-03 ENCOUNTER — TELEPHONE (OUTPATIENT)
Dept: BARIATRICS/WEIGHT MGMT | Facility: CLINIC | Age: 34
End: 2024-09-03
Payer: COMMERCIAL

## 2024-09-03 RX ORDER — SUCRALFATE 1 G/1
1 TABLET ORAL 3 TIMES DAILY
Qty: 90 TABLET | Refills: 1 | OUTPATIENT
Start: 2024-09-03

## 2024-09-03 NOTE — TELEPHONE ENCOUNTER
LVM for patient to call back in regards to 2 prescription refill request we got on Reglan and Carafate.

## 2024-09-05 NOTE — TELEPHONE ENCOUNTER
Received a response from provider in reference to Reglan refill request.      Radhika Mendoza APRN to You:    There is a risk of uncontrollable muscle movements which may not be reversible if taking reglan longer than a month. I can refill if she understands the risk. I think she should see [facility] for a scope follow up if this is still the case even after dilatation.      Tried to reach patient. Had to leave a voicemail

## 2024-09-13 RX ORDER — FOLIC ACID 1 MG/1
1000 TABLET ORAL DAILY
Qty: 30 TABLET | Refills: 1 | OUTPATIENT
Start: 2024-09-13

## 2024-10-30 ENCOUNTER — OFFICE VISIT (OUTPATIENT)
Dept: BARIATRICS/WEIGHT MGMT | Facility: CLINIC | Age: 34
End: 2024-10-30
Payer: COMMERCIAL

## 2024-10-30 ENCOUNTER — TELEPHONE (OUTPATIENT)
Dept: BARIATRICS/WEIGHT MGMT | Facility: CLINIC | Age: 34
End: 2024-10-30
Payer: COMMERCIAL

## 2024-10-30 ENCOUNTER — PREP FOR SURGERY (OUTPATIENT)
Dept: OTHER | Facility: HOSPITAL | Age: 34
End: 2024-10-30
Payer: COMMERCIAL

## 2024-10-30 VITALS
BODY MASS INDEX: 27.63 KG/M2 | SYSTOLIC BLOOD PRESSURE: 110 MMHG | TEMPERATURE: 97.7 F | HEIGHT: 72 IN | WEIGHT: 204 LBS | HEART RATE: 72 BPM | DIASTOLIC BLOOD PRESSURE: 70 MMHG

## 2024-10-30 DIAGNOSIS — R13.10 DYSPHAGIA, UNSPECIFIED TYPE: ICD-10-CM

## 2024-10-30 DIAGNOSIS — R68.81 EARLY SATIETY: ICD-10-CM

## 2024-10-30 DIAGNOSIS — M25.50 MULTIPLE JOINT PAIN: ICD-10-CM

## 2024-10-30 DIAGNOSIS — R10.13 EPIGASTRIC PAIN: ICD-10-CM

## 2024-10-30 DIAGNOSIS — R11.2 NAUSEA AND VOMITING, UNSPECIFIED VOMITING TYPE: ICD-10-CM

## 2024-10-30 DIAGNOSIS — R11.2 NAUSEA AND VOMITING, UNSPECIFIED VOMITING TYPE: Primary | ICD-10-CM

## 2024-10-30 DIAGNOSIS — Z98.84 S/P LAPAROSCOPIC SLEEVE GASTRECTOMY: ICD-10-CM

## 2024-10-30 DIAGNOSIS — E66.3 OVERWEIGHT (BMI 25.0-29.9): ICD-10-CM

## 2024-10-30 DIAGNOSIS — E28.2 PCOS (POLYCYSTIC OVARIAN SYNDROME): ICD-10-CM

## 2024-10-30 DIAGNOSIS — E66.811 OBESITY, CLASS I, BMI 30-34.9: Primary | ICD-10-CM

## 2024-10-30 DIAGNOSIS — F41.8 DEPRESSION WITH ANXIETY: ICD-10-CM

## 2024-10-30 PROBLEM — R29.818 SUSPECTED SLEEP APNEA: Status: RESOLVED | Noted: 2023-10-04 | Resolved: 2024-10-30

## 2024-10-30 PROBLEM — R06.81 WITNESSED APNEIC SPELLS: Status: RESOLVED | Noted: 2023-10-04 | Resolved: 2024-10-30

## 2024-10-30 PROBLEM — I10 PRIMARY HYPERTENSION: Status: RESOLVED | Noted: 2022-12-22 | Resolved: 2024-10-30

## 2024-10-30 RX ORDER — FERROUS SULFATE 325(65) MG
325 TABLET ORAL
COMMUNITY
Start: 2024-09-30

## 2024-10-30 RX ORDER — FOLIC ACID 1 MG/1
1 TABLET ORAL DAILY
COMMUNITY
Start: 2024-08-15

## 2024-10-30 RX ORDER — TOPIRAMATE 50 MG/1
50 TABLET, FILM COATED ORAL
COMMUNITY
Start: 2024-08-26

## 2024-10-30 NOTE — PROGRESS NOTES
"[facility] BARIATRIC SURGERY  [address]  [facility] [id number] [phone]  [address]  [facility] [address] [phone]  Dept: [phone]  10/30/2024      Meghan Lui.  [id number]  [id number]  1990  female      Chief Complaint   Patient presents with    Follow-up     6 mo follow up sleeve       BH Post-Op Bariatric Surgery:   Meghan Lui is status post Laparoscopic Sleeve/PEH procedure, performed on 4/21/24 with history of dysphagia and vomiting after surgery which did improve after EGD with dilatation on 6/18 and on 7/26. She was taking carafate through last month and is still currently on 40mg of protonix daily.      HPI:       Today's weight is 92.5 kg (204 lb) pounds, today's BMI is Body mass index is 27.94 kg/m²., she has a loss of 36 pounds since the last visit and her weight loss since surgery is 165 pounds. The patient reports a decreased portion size and loss of appetite.    Meghan Lui reports hair loss and thinning and still reports some ongoing \"stomach issues\" including waves of epigastric pain that lead to dry heaving during her work day that is not associated with eating. She can tolerate 3-5 bites of chicken in a sitting compared to 3-4 at her last visit but is concerned about increasing frequency of stomach spasms.      Diet and Exercise: Diet history reviewed and discussed with the patient. Weight loss/gains to date discussed with the patient. The patient states they are eating 40-45 grams of protein per day. She reports eating 2 meals per day, a typical portion size of 1/2 cup, eating 1 snacks per day, drinking 60 or more 8-oz. glasses of water per day, no carbonated beverage consumption and exercising regularly.     Supplements: flintstones withg .     Review of Systems   Constitutional:  Positive for appetite change. Negative for fatigue and unexpected weight change.   HENT: Negative.   "   Eyes: Negative.    Respiratory: Negative.     Cardiovascular: Negative.  Negative for leg swelling.   Gastrointestinal:  Positive for abdominal pain (epigastric pain, intermittent, associated with nausea and vomiting), nausea and vomiting. Negative for abdominal distention, constipation and diarrhea.   Genitourinary:  Negative for difficulty urinating, frequency and urgency.   Musculoskeletal:  Negative for back pain.   Skin: Negative.    Psychiatric/Behavioral: Negative.     All other systems reviewed and are negative.      Patient Active Problem List   Diagnosis    Chondromalacia, bilateral knee    Intractable chronic migraine without aura and without status migrainosus    Insomnia    DDD (degenerative disc disease), lumbar    Neuropathy    Recurrent major depressive disorder, in partial remission    Hashimoto's thyroiditis    Irritable bowel syndrome with both constipation and diarrhea    Mood disorder    Migraines    Lumbar radiculopathy    Hypothyroidism    Arthritis    Ankle edema    Hyperlipemia    Overflow stress urinary incontinence in female    Heavy period    Multiple joint pain    Balance disorder    Depression with anxiety    Bipolar 2 disorder    Hidradenitis    PCOS (polycystic ovarian syndrome)    Sleep-related bruxism    Restless legs syndrome (RLS)    Hypersomnia    Snoring    Hidradenitis suppurativa of anus    Paraesophageal hernia    Osteoarthritis of patellofemoral joints of both knees    Drug-induced myoclonus    Hyponatremia    Nausea & vomiting    Dizziness    S/P laparoscopic sleeve gastrectomy    Overweight (BMI 25.0-29.9)       Past Medical History:   Diagnosis Date    Anemia april 2023    iron defiency anemia    Anxiety     Arthritis     Asthma     Back pain     Bipolar 2 disorder     Bladder problem     Cholelithiasis     Removed October 2011    Cluster headache 2011    Condition not found     HERNIATED DISC (UNSPECIFIED)     Cyst of skin     Depression     Difficulty walking     Foot  pain, left 12/04/2019    Headache, tension-type 2005    Heel pain     Hidradenitis suppurativa     on med    HL (hearing loss)     Hx of blood diseases     Hx of degenerative disc disease     Hyperlipidemia     Hypertension 2015    bloop pressure elevates when with migraines    Hypothyroidism     Ingrown toenail     Irritable bowel syndrome     Leg pain     Leg swelling     Lumbago     Memory loss     Migraines     Mood disorder     Muscle cramps     Neuromuscular disorder 2018    Numbness in feet     Obesity     Thyroid disease     Trouble swallowing     Visual impairment     Vomiting        The following portions of the patient's history were reviewed and updated as appropriate: allergies, current medications, past family history, past medical history, past social history, past surgical history, and problem list.    Vitals:    10/30/24 1029   BP: 110/70   Pulse: 72   Temp: 97.7 °F (36.5 °C)       Physical Exam  Vitals and nursing note reviewed.   Constitutional:       Appearance: She is well-developed.   Neck:      Thyroid: No thyromegaly.   Cardiovascular:      Rate and Rhythm: Normal rate.   Pulmonary:      Effort: Pulmonary effort is normal. No respiratory distress.   Abdominal:      Palpations: Abdomen is soft.   Musculoskeletal:         General: No tenderness.   Skin:     General: Skin is warm and dry.   Neurological:      Mental Status: She is alert.   Psychiatric:         Behavior: Behavior normal.         Assessment:   Post-op, the patient is doing well.     Encounter Diagnoses   Name Primary?    Obesity, Class I, BMI 30-34.9 Yes    PCOS (polycystic ovarian syndrome)     Overweight (BMI 25.0-29.9)     Depression with anxiety     Multiple joint pain        Plan:   Will prescribe levsin as needed. Patient reports previous EGD with dilatation has been helpful in reducing frequency of epigastric pain and spasms in the past and she would like to move forward with more dilitation if this is warranted.    Encouraged patient to be sure to get plenty of lean protein per day through small frequent meals all with a protein source.   Activity restrictions: none.   Recommended patient be sure to get at least 70 grams of protein per day by eating small, frequent meals all with high lean protein choices. Be sure to limit/cut back on daily carbohydrate intake. Discussed with the patient the recommended amount of water per day to intake- half of body weight in ounces. Reviewed vitamin requirements. Be sure to do routine exercise, 150 minutes per week minimum, including both cardio and strength training.     Instructions / Recommendations: dietary counseling recommended, recommended a daily protein intake of  grams, vitamin supplement(s) recommended, recommended exercising at least 150 minutes per week, behavior modifications recommended and instructed to call the office for concerns, questions, or problems.     The patient was instructed to follow up in 3 months .      Total time spent during this encounter today was 35 minutes

## 2024-10-30 NOTE — H&P (VIEW-ONLY)
"[facility]ATR[facility] BARIATRIC SURGERY  [address]  [facility] [id number] [phone]  [address]  [facility] [address] [phone]  Dept: [phone]  10/30/2024      Meghan Lui.  [id number]  [id number]  1990  female      Chief Complaint   Patient presents with    Follow-up     6 mo follow up sleeve       BH Post-Op Bariatric Surgery:   Meghan Lui is status post Laparoscopic Sleeve/PEH procedure, performed on 4/21/24 with history of dysphagia and vomiting after surgery which did improve after EGD with dilatation on 6/18 and on 7/26. She was taking carafate through last month and is still currently on 40mg of protonix daily.      HPI:       Today's weight is 92.5 kg (204 lb) pounds, today's BMI is Body mass index is 27.94 kg/m²., she has a loss of 36 pounds since the last visit and her weight loss since surgery is 165 pounds. The patient reports a decreased portion size and loss of appetite.    Meghan Lui reports hair loss and thinning and still reports some ongoing \"stomach issues\" including waves of epigastric pain that lead to dry heaving during her work day that is not associated with eating. She can tolerate 3-5 bites of chicken in a sitting compared to 3-4 at her last visit but is concerned about increasing frequency of stomach spasms.      Diet and Exercise: Diet history reviewed and discussed with the patient. Weight loss/gains to date discussed with the patient. The patient states they are eating 40-45 grams of protein per day. She reports eating 2 meals per day, a typical portion size of 1/2 cup, eating 1 snacks per day, drinking 60 or more 8-oz. glasses of water per day, no carbonated beverage consumption and exercising regularly.     Supplements: flintstones withg .     Review of Systems   Constitutional:  Positive for appetite change. Negative for fatigue and unexpected weight change.   HENT: Negative.   "   Eyes: Negative.    Respiratory: Negative.     Cardiovascular: Negative.  Negative for leg swelling.   Gastrointestinal:  Positive for abdominal pain (epigastric pain, intermittent, associated with nausea and vomiting), nausea and vomiting. Negative for abdominal distention, constipation and diarrhea.   Genitourinary:  Negative for difficulty urinating, frequency and urgency.   Musculoskeletal:  Negative for back pain.   Skin: Negative.    Psychiatric/Behavioral: Negative.     All other systems reviewed and are negative.      Patient Active Problem List   Diagnosis    Chondromalacia, bilateral knee    Intractable chronic migraine without aura and without status migrainosus    Insomnia    DDD (degenerative disc disease), lumbar    Neuropathy    Recurrent major depressive disorder, in partial remission    Hashimoto's thyroiditis    Irritable bowel syndrome with both constipation and diarrhea    Mood disorder    Migraines    Lumbar radiculopathy    Hypothyroidism    Arthritis    Ankle edema    Hyperlipemia    Overflow stress urinary incontinence in female    Heavy period    Multiple joint pain    Balance disorder    Depression with anxiety    Bipolar 2 disorder    Hidradenitis    PCOS (polycystic ovarian syndrome)    Sleep-related bruxism    Restless legs syndrome (RLS)    Hypersomnia    Snoring    Hidradenitis suppurativa of anus    Paraesophageal hernia    Osteoarthritis of patellofemoral joints of both knees    Drug-induced myoclonus    Hyponatremia    Nausea & vomiting    Dizziness    S/P laparoscopic sleeve gastrectomy    Overweight (BMI 25.0-29.9)       Past Medical History:   Diagnosis Date    Anemia april 2023    iron defiency anemia    Anxiety     Arthritis     Asthma     Back pain     Bipolar 2 disorder     Bladder problem     Cholelithiasis     Removed October 2011    Cluster headache 2011    Condition not found     HERNIATED DISC (UNSPECIFIED)     Cyst of skin     Depression     Difficulty walking     Foot  pain, left 12/04/2019    Headache, tension-type 2005    Heel pain     Hidradenitis suppurativa     on med    HL (hearing loss)     Hx of blood diseases     Hx of degenerative disc disease     Hyperlipidemia     Hypertension 2015    bloop pressure elevates when with migraines    Hypothyroidism     Ingrown toenail     Irritable bowel syndrome     Leg pain     Leg swelling     Lumbago     Memory loss     Migraines     Mood disorder     Muscle cramps     Neuromuscular disorder 2018    Numbness in feet     Obesity     Thyroid disease     Trouble swallowing     Visual impairment     Vomiting        The following portions of the patient's history were reviewed and updated as appropriate: allergies, current medications, past family history, past medical history, past social history, past surgical history, and problem list.    Vitals:    10/30/24 1029   BP: 110/70   Pulse: 72   Temp: 97.7 °F (36.5 °C)       Physical Exam  Vitals and nursing note reviewed.   Constitutional:       Appearance: She is well-developed.   Neck:      Thyroid: No thyromegaly.   Cardiovascular:      Rate and Rhythm: Normal rate.   Pulmonary:      Effort: Pulmonary effort is normal. No respiratory distress.   Abdominal:      Palpations: Abdomen is soft.   Musculoskeletal:         General: No tenderness.   Skin:     General: Skin is warm and dry.   Neurological:      Mental Status: She is alert.   Psychiatric:         Behavior: Behavior normal.         Assessment:   Post-op, the patient is doing well.     Encounter Diagnoses   Name Primary?    Obesity, Class I, BMI 30-34.9 Yes    PCOS (polycystic ovarian syndrome)     Overweight (BMI 25.0-29.9)     Depression with anxiety     Multiple joint pain        Plan:   Will prescribe levsin as needed. Patient reports previous EGD with dilatation has been helpful in reducing frequency of epigastric pain and spasms in the past and she would like to move forward with more dilitation if this is warranted.    Encouraged patient to be sure to get plenty of lean protein per day through small frequent meals all with a protein source.   Activity restrictions: none.   Recommended patient be sure to get at least 70 grams of protein per day by eating small, frequent meals all with high lean protein choices. Be sure to limit/cut back on daily carbohydrate intake. Discussed with the patient the recommended amount of water per day to intake- half of body weight in ounces. Reviewed vitamin requirements. Be sure to do routine exercise, 150 minutes per week minimum, including both cardio and strength training.     Instructions / Recommendations: dietary counseling recommended, recommended a daily protein intake of  grams, vitamin supplement(s) recommended, recommended exercising at least 150 minutes per week, behavior modifications recommended and instructed to call the office for concerns, questions, or problems.     The patient was instructed to follow up in 3 months .      Total time spent during this encounter today was 35 minutes

## 2024-10-31 ENCOUNTER — ANESTHESIA EVENT (OUTPATIENT)
Dept: GASTROENTEROLOGY | Facility: HOSPITAL | Age: 34
End: 2024-10-31
Payer: COMMERCIAL

## 2024-11-01 ENCOUNTER — HOSPITAL ENCOUNTER (OUTPATIENT)
Facility: HOSPITAL | Age: 34
Setting detail: HOSPITAL OUTPATIENT SURGERY
Discharge: HOME | End: 2024-11-01
Attending: SURGERY | Admitting: SURGERY
Payer: COMMERCIAL

## 2024-11-01 ENCOUNTER — ANESTHESIA (OUTPATIENT)
Dept: GASTROENTEROLOGY | Facility: HOSPITAL | Age: 34
End: 2024-11-01
Payer: COMMERCIAL

## 2024-11-01 VITALS
OXYGEN SATURATION: 98 % | BODY MASS INDEX: 28.57 KG/M2 | RESPIRATION RATE: 16 BRPM | DIASTOLIC BLOOD PRESSURE: 82 MMHG | HEART RATE: 63 BPM | WEIGHT: 204.1 LBS | HEIGHT: 71 IN | SYSTOLIC BLOOD PRESSURE: 117 MMHG

## 2024-11-01 PROCEDURE — 25010000002 PROPOFOL 1000 MG/100ML EMULSION: Performed by: NURSE ANESTHETIST, CERTIFIED REGISTERED

## 2024-11-01 PROCEDURE — 25010000002 LIDOCAINE 2% SOLUTION: Performed by: NURSE ANESTHETIST, CERTIFIED REGISTERED

## 2024-11-01 PROCEDURE — 25810000003 LACTATED RINGERS PER 1000 ML: Performed by: SURGERY

## 2024-11-01 PROCEDURE — 43245 EGD DILATE STRICTURE: CPT | Performed by: SURGERY

## 2024-11-01 PROCEDURE — 25010000002 GLYCOPYRROLATE 0.2 MG/ML SOLUTION: Performed by: NURSE ANESTHETIST, CERTIFIED REGISTERED

## 2024-11-01 PROCEDURE — C1726 CATH, BAL DIL, NON-VASCULAR: HCPCS | Performed by: SURGERY

## 2024-11-01 RX ORDER — SODIUM CHLORIDE, SODIUM LACTATE, POTASSIUM CHLORIDE, CALCIUM CHLORIDE 600; 310; 30; 20 MG/100ML; MG/100ML; MG/100ML; MG/100ML
30 INJECTION, SOLUTION INTRAVENOUS CONTINUOUS
Status: DISCONTINUED | OUTPATIENT
Start: 2024-11-01 | End: 2024-11-01 | Stop reason: HOSPADM

## 2024-11-01 RX ORDER — LIDOCAINE HYDROCHLORIDE 20 MG/ML
INJECTION, SOLUTION INFILTRATION; PERINEURAL AS NEEDED
Status: DISCONTINUED | OUTPATIENT
Start: 2024-11-01 | End: 2024-11-01 | Stop reason: SURG

## 2024-11-01 RX ORDER — GLYCOPYRROLATE 0.2 MG/ML
INJECTION INTRAMUSCULAR; INTRAVENOUS AS NEEDED
Status: DISCONTINUED | OUTPATIENT
Start: 2024-11-01 | End: 2024-11-01 | Stop reason: SURG

## 2024-11-01 RX ORDER — PROPOFOL 10 MG/ML
INJECTION, EMULSION INTRAVENOUS CONTINUOUS PRN
Status: DISCONTINUED | OUTPATIENT
Start: 2024-11-01 | End: 2024-11-01 | Stop reason: SURG

## 2024-11-01 RX ADMIN — SODIUM CHLORIDE, POTASSIUM CHLORIDE, SODIUM LACTATE AND CALCIUM CHLORIDE 30 ML/HR: 600; 310; 30; 20 INJECTION, SOLUTION INTRAVENOUS at 06:51

## 2024-11-01 RX ADMIN — LIDOCAINE HYDROCHLORIDE 80 MG: 20 INJECTION, SOLUTION INFILTRATION; PERINEURAL at 07:20

## 2024-11-01 RX ADMIN — GLYCOPYRROLATE 0.2 MG: 0.2 INJECTION INTRAMUSCULAR; INTRAVENOUS at 07:20

## 2024-11-01 RX ADMIN — PROPOFOL INJECTABLE EMULSION 80 MG: 10 INJECTION, EMULSION INTRAVENOUS at 07:21

## 2024-11-01 RX ADMIN — PROPOFOL INJECTABLE EMULSION 200 MCG/KG/MIN: 10 INJECTION, EMULSION INTRAVENOUS at 07:20

## 2024-11-01 NOTE — OP NOTE
Surgeon: Steve Ferraro Jr., M.D.    Preoperative Diagnosis: Dysphagia status post sleeve gastrectomy and paraesophageal hernia repair    Postoperative Diagnosis: Same    Procedure Performed: Transoral esophagogastroduodenoscopy with 18-20 balloon dilatation of pylorus    Indications: 34-year-old female 6-month status post sleeve gastrectomy and paraesophageal hernia pair with complaints of dysphagia.    Procedure:     The procedure, indications, preparation and potential, patient were explained to the patient, who indicated understanding and signed the corresponding consent forms.  The patient was identified, taken to the endoscopy suite, and placed on the left side down decubitus position.  The patient underwent a MAC anesthesia and was appropriately monitored through the case by the anesthesia personnel using continuous pulse oximetry, blood pressure, and cardiac monitoring.  A bite block was placed.  After adequate IV sedation and using a transoral technique a lubed flexible endoscope was placed in the hypopharynx and advanced to the second portion of the duodenum.  The pylorus was mildly strictured and the scope had to be advanced with some pressure into the duodenum.  The scope was then withdrawn back into the gastric sleeve.  There was no stricture noted in the gastric sleeve unless dictated below.  No polyps or ulcers were seen unless dictated below.  The scope was then withdrawn back into the esophagus.  The Z line was regular and no erosive esophagitis seen unless dictated below.  Scope was then advanced back down into the antrum.  A 18-20 balloon catheter was then advanced across the pylorus and taken up in sequence and each level held for approximately 1 minute.  The catheter was deflated and removed.  The pylorus dilated up nicely.  The scope was then completely withdrawn after decompressing the stomach.  The patient tolerated the procedure well and left the endoscopy suite in stable condition.    The  sleeve was of normal size without stricture or dilatation.  The pylorus was very snug but opened up nicely after dilatation.  The balloon was kept inflated and pulled back across the incisura without any difficulty.  The Z-line was regular no erosive esophagitis noted.    Recommendations:     Follow-up in the office as scheduled.

## 2024-11-01 NOTE — ANESTHESIA PREPROCEDURE EVALUATION
Anesthesia Evaluation     Patient summary reviewed and Nursing notes reviewed   NPO Solid Status: > 8 hours  NPO Liquid Status: > 4 hours           Airway   Mallampati: II  Neck ROM: full  No difficulty expected  Dental    (+) upper dentures    Pulmonary     breath sounds clear to auscultation  (+) a smoker Former, asthma,  Cardiovascular     Rhythm: regular    (+) hypertension, hyperlipidemia      Neuro/Psych  (+) headaches, dizziness/light headedness, numbness, psychiatric history Bipolar, Anxiety and Depression  GI/Hepatic/Renal/Endo    (+) thyroid problem     Musculoskeletal     (+) back pain  Abdominal    Substance History      OB/GYN          Other   arthritis,                 Anesthesia Plan    ASA 2     MAC     intravenous induction     Anesthetic plan, risks, benefits, and alternatives have been provided, discussed and informed consent has been obtained with: patient.    CODE STATUS:

## 2024-11-01 NOTE — DISCHARGE INSTRUCTIONS
For the next 24 hours patient needs to be with a responsible adult.    For THE REST OF TODAY DO NOT drive, operate machinery, appliances, drink alcohol, make important decisions or sign legal documents.    Start with a light or bland diet if you are feeling sick to your stomach otherwise advance to regular diet as tolerated.    Follow recommendations on procedure report if provided by your doctor.    Call Dr Ferraro     Problems may include but not limited to: large amounts of bleeding, trouble breathing, repeated vomiting, severe unrelieved pain, fever or chills.      If biopsies or polyps were taken, MD will call you with the results in about 7 days. If you don't hear from the MD in 2 weeks, call the number above.

## 2024-11-01 NOTE — ANESTHESIA POSTPROCEDURE EVALUATION
"Patient: Meghan Lui    Procedure Summary       Date: 11/01/24 Room / Location:  [facility] /  [facility]    Anesthesia Start: 0717 Anesthesia Stop: 0731    Procedure: ESOPHAGOGASTRODUODENOSCOPY WITH 18-20mm DILATATION (Esophagus) Diagnosis:       Nausea and vomiting, unspecified vomiting type      Epigastric pain      Dysphagia, unspecified type      (Nausea and vomiting, unspecified vomiting type [R11.2])      (Epigastric pain [R10.13])      (Dysphagia, unspecified type [R13.10])    Surgeons: Steve Ferraro Jr., MD Provider: Cal Hopper MD    Anesthesia Type: MAC ASA Status: 2            Anesthesia Type: MAC    Vitals  Vitals Value Taken Time   /82 11/01/24 0748   Temp     Pulse 59 11/01/24 0749   Resp 16 11/01/24 0748   SpO2 98 % 11/01/24 0749   Vitals shown include unfiled device data.        Post Anesthesia Care and Evaluation    Patient location during evaluation: bedside  Patient participation: complete - patient participated  Level of consciousness: sleepy but conscious  Pain score: 0  Pain management: adequate    Airway patency: patent  Anesthetic complications: No anesthetic complications    Cardiovascular status: acceptable  Respiratory status: acceptable  Hydration status: acceptable    Comments: /82 (BP Location: Left arm, Patient Position: Sitting)   Pulse 63   Resp 16   Ht 180.3 cm (71\")   Wt 92.6 kg (204 lb 1.6 oz)   LMP 10/03/2024 (Exact Date)   SpO2 98%   BMI 28.47 kg/m²       "

## 2024-11-18 RX ORDER — FOLIC ACID 1 MG/1
1000 TABLET ORAL DAILY
Qty: 30 TABLET | Refills: 1 | OUTPATIENT
Start: 2024-11-18

## 2025-01-15 ENCOUNTER — OFFICE VISIT (OUTPATIENT)
Dept: FAMILY MEDICINE CLINIC | Facility: CLINIC | Age: 35
End: 2025-01-15
Payer: COMMERCIAL

## 2025-01-15 VITALS
SYSTOLIC BLOOD PRESSURE: 136 MMHG | TEMPERATURE: 98 F | WEIGHT: 194.3 LBS | BODY MASS INDEX: 27.2 KG/M2 | HEART RATE: 65 BPM | DIASTOLIC BLOOD PRESSURE: 82 MMHG | HEIGHT: 71 IN | OXYGEN SATURATION: 100 %

## 2025-01-15 DIAGNOSIS — E53.8 VITAMIN B12 DEFICIENCY: ICD-10-CM

## 2025-01-15 DIAGNOSIS — E55.9 VITAMIN D DEFICIENCY: ICD-10-CM

## 2025-01-15 DIAGNOSIS — Z01.89 ROUTINE LAB DRAW: ICD-10-CM

## 2025-01-15 DIAGNOSIS — E61.1 IRON DEFICIENCY: ICD-10-CM

## 2025-01-15 DIAGNOSIS — B37.9 YEAST INFECTION: Primary | ICD-10-CM

## 2025-01-15 DIAGNOSIS — Z13.220 SCREENING FOR LIPID DISORDERS: ICD-10-CM

## 2025-01-15 PROCEDURE — 99214 OFFICE O/P EST MOD 30 MIN: CPT

## 2025-01-15 PROCEDURE — 1159F MED LIST DOCD IN RCRD: CPT

## 2025-01-15 PROCEDURE — 1125F AMNT PAIN NOTED PAIN PRSNT: CPT

## 2025-01-15 PROCEDURE — 1160F RVW MEDS BY RX/DR IN RCRD: CPT

## 2025-01-15 RX ORDER — CLONIDINE HYDROCHLORIDE 0.1 MG/1
TABLET ORAL
COMMUNITY
Start: 2025-01-09

## 2025-01-15 RX ORDER — NYSTATIN 100000 [USP'U]/G
POWDER TOPICAL 3 TIMES DAILY
Qty: 60 G | Refills: 2 | Status: SHIPPED | OUTPATIENT
Start: 2025-01-15

## 2025-01-15 RX ORDER — LEVOTHYROXINE SODIUM 137 MCG
137 TABLET ORAL DAILY
Qty: 90 TABLET | Refills: 1 | Status: SHIPPED | OUTPATIENT
Start: 2025-01-15

## 2025-01-15 RX ORDER — NYSTATIN 100000 U/G
1 CREAM TOPICAL 2 TIMES DAILY
Qty: 30 G | Refills: 2 | Status: SHIPPED | OUTPATIENT
Start: 2025-01-15

## 2025-01-15 RX ORDER — PALIPERIDONE 3 MG/1
TABLET, EXTENDED RELEASE ORAL
COMMUNITY
Start: 2025-01-09

## 2025-01-15 NOTE — PROGRESS NOTES
"Meghan Lui presents to [facility] with complaints of rash.      History of Present Illness  This is a 34-year-old female who presents to the clinic with complaints of rash.    Patient states that she has actually had this ongoing issue for the past several months but it is definitely seeming to get more frequent and worse.  Patient states that she develops a rash underneath the skin fold of her pannus, and states that when the rash starts it is really itchy, she does have some drainage, and it also burns.  Patient states that it comes and goes and she does try to keep the area as dry as possible.  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  States that it is gotten worse since she has had her bariatric surgery and has lost a substantial amount of weight.  States that she has tried several things over-the-counter but nothing gives her a lot of relief and thus she is here to discuss about other options.    Patient also states that she is done really well from a bariatric standpoint, has lost about 130 pounds since her surgery last April, states she is worked really hard on her diet and increased exercise, she is really pleased with the progress.  Is due for updated blood work as it has been a while to check her vitamin levels and thyroid levels as she has been on the same dosage of thyroid medication since starting it prior to the weight loss.    The following portions of the patient's history were personally reviewed and updated as appropriate: allergies, current medications, past medical history, past surgical history, past family history, and past social history.       Objective   Vital Signs:   /82 (BP Location: Left arm, Patient Position: Sitting, Cuff Size: Adult)   Pulse 65   Temp 98 °F (36.7 °C)   Ht 180.3 cm (70.98\")   Wt 88.1 kg (194 lb 4.8 oz)   SpO2 " 100%   BMI 27.11 kg/m²     Body mass index is 27.11 kg/m².    All labs, imaging, test results, and specialty provider notes reviewed with patient.     Physical Exam  Vitals reviewed.   Constitutional:       Appearance: Normal appearance.   Cardiovascular:      Rate and Rhythm: Normal rate and regular rhythm.      Pulses: Normal pulses.      Heart sounds: Normal heart sounds.   Pulmonary:      Effort: Pulmonary effort is normal.      Breath sounds: Normal breath sounds.   Neurological:      General: No focal deficit present.      Mental Status: She is alert and oriented to person, place, and time.                Assessment and Plan:  Diagnoses and all orders for this visit:    1. Yeast infection (Primary)  Comments:  Will treat with nystatin cream and powder. Likely because of excess skin from weight loss. Consider panniculectomy in future.  Orders:  -     nystatin (MYCOSTATIN) 418633 UNIT/GM powder; Apply  topically to the appropriate area as directed 3 (Three) Times a Day.  Dispense: 60 g; Refill: 2  -     nystatin (MYCOSTATIN) 228999 UNIT/GM cream; Apply 1 Application topically to the appropriate area as directed 2 (Two) Times a Day.  Dispense: 30 g; Refill: 2    2. Routine lab draw  -     CBC Auto Differential; Future  -     Comprehensive Metabolic Panel; Future  -     TSH Rfx On Abnormal To Free T4; Future  -     Urinalysis With Culture If Indicated -; Future    3. Screening for lipid disorders  -     Lipid Panel; Future    4. Iron deficiency  -     Ferritin; Future  -     Iron Profile; Future    5. Vitamin D deficiency  -     Vitamin D,25-Hydroxy; Future    6. Vitamin B12 deficiency  -     Vitamin B12 & Folate; Future    Other orders  -     Synthroid 137 MCG tablet; Take 1 tablet by mouth Daily.  Dispense: 90 tablet; Refill: 1          Follow Up:  Return in about 6 months (around 7/15/2025).    Patient was given instructions and counseling regarding her condition or for health maintenance advice. Please see  specific information pulled into the AVS if appropriate.

## 2025-03-17 ENCOUNTER — TRANSCRIBE ORDERS (OUTPATIENT)
Dept: ADMINISTRATIVE | Facility: HOSPITAL | Age: 35
End: 2025-03-17
Payer: COMMERCIAL

## 2025-03-17 DIAGNOSIS — M47.26 OTHER SPONDYLOSIS WITH RADICULOPATHY, LUMBAR REGION: Primary | ICD-10-CM

## 2025-03-17 DIAGNOSIS — Z01.818 PRE-OP TESTING: ICD-10-CM

## 2025-03-25 ENCOUNTER — LAB (OUTPATIENT)
Facility: HOSPITAL | Age: 35
End: 2025-03-25
Payer: COMMERCIAL

## 2025-03-25 ENCOUNTER — HOSPITAL ENCOUNTER (OUTPATIENT)
Facility: HOSPITAL | Age: 35
Discharge: HOME | End: 2025-03-25
Payer: COMMERCIAL

## 2025-03-25 DIAGNOSIS — M47.26 OTHER SPONDYLOSIS WITH RADICULOPATHY, LUMBAR REGION: ICD-10-CM

## 2025-03-25 DIAGNOSIS — Z01.818 PRE-OP TESTING: ICD-10-CM

## 2025-03-25 LAB
ALBUMIN SERPL-MCNC: 4.1 G/DL (ref 3.5–5.2)
ALBUMIN/GLOB SERPL: 1.7 G/DL
ALP SERPL-CCNC: 57 U/L (ref 39–117)
ALT SERPL W P-5'-P-CCNC: 38 U/L (ref 1–33)
ANION GAP SERPL CALCULATED.3IONS-SCNC: 10 MMOL/L (ref 5–15)
AST SERPL-CCNC: 28 U/L (ref 1–32)
BASOPHILS # BLD AUTO: 0.05 10*3/MM3 (ref 0–0.2)
BASOPHILS NFR BLD AUTO: 1 % (ref 0–1.5)
BILIRUB SERPL-MCNC: 0.6 MG/DL (ref 0–1.2)
BLD GP AB SCN SERPL QL: NEGATIVE
BUN SERPL-MCNC: 10 MG/DL (ref 6–20)
BUN/CREAT SERPL: 9.2 (ref 7–25)
CALCIUM SPEC-SCNC: 9 MG/DL (ref 8.6–10.5)
CHLORIDE SERPL-SCNC: 106 MMOL/L (ref 98–107)
CO2 SERPL-SCNC: 25 MMOL/L (ref 22–29)
CREAT SERPL-MCNC: 1.09 MG/DL (ref 0.57–1)
DEPRECATED RDW RBC AUTO: 39.7 FL (ref 37–54)
EGFRCR SERPLBLD CKD-EPI 2021: 68.5 ML/MIN/1.73
EOSINOPHIL # BLD AUTO: 0.04 10*3/MM3 (ref 0–0.4)
EOSINOPHIL NFR BLD AUTO: 0.8 % (ref 0.3–6.2)
ERYTHROCYTE [DISTWIDTH] IN BLOOD BY AUTOMATED COUNT: 11.6 % (ref 12.3–15.4)
GLOBULIN UR ELPH-MCNC: 2.4 GM/DL
GLUCOSE SERPL-MCNC: 86 MG/DL (ref 65–99)
HCT VFR BLD AUTO: 41.4 % (ref 34–46.6)
HGB BLD-MCNC: 14.1 G/DL (ref 12–15.9)
IMM GRANULOCYTES # BLD AUTO: 0.01 10*3/MM3 (ref 0–0.05)
IMM GRANULOCYTES NFR BLD AUTO: 0.2 % (ref 0–0.5)
LYMPHOCYTES # BLD AUTO: 1.36 10*3/MM3 (ref 0.7–3.1)
LYMPHOCYTES NFR BLD AUTO: 26.3 % (ref 19.6–45.3)
MCH RBC QN AUTO: 31.8 PG (ref 26.6–33)
MCHC RBC AUTO-ENTMCNC: 34.1 G/DL (ref 31.5–35.7)
MCV RBC AUTO: 93.5 FL (ref 79–97)
MONOCYTES # BLD AUTO: 0.26 10*3/MM3 (ref 0.1–0.9)
MONOCYTES NFR BLD AUTO: 5 % (ref 5–12)
NEUTROPHILS NFR BLD AUTO: 3.45 10*3/MM3 (ref 1.7–7)
NEUTROPHILS NFR BLD AUTO: 66.7 % (ref 42.7–76)
NRBC BLD AUTO-RTO: 0 /100 WBC (ref 0–0.2)
PLATELET # BLD AUTO: 186 10*3/MM3 (ref 140–450)
PMV BLD AUTO: 10.5 FL (ref 6–12)
POTASSIUM SERPL-SCNC: 3.7 MMOL/L (ref 3.5–5.2)
PROT SERPL-MCNC: 6.5 G/DL (ref 6–8.5)
QT INTERVAL: 418 MS
QTC INTERVAL: 389 MS
RBC # BLD AUTO: 4.43 10*6/MM3 (ref 3.77–5.28)
SODIUM SERPL-SCNC: 141 MMOL/L (ref 136–145)
WBC NRBC COR # BLD AUTO: 5.17 10*3/MM3 (ref 3.4–10.8)

## 2025-03-25 PROCEDURE — 86850 RBC ANTIBODY SCREEN: CPT

## 2025-03-25 PROCEDURE — 93005 ELECTROCARDIOGRAM TRACING: CPT

## 2025-03-25 PROCEDURE — 36415 COLL VENOUS BLD VENIPUNCTURE: CPT

## 2025-03-25 PROCEDURE — 80053 COMPREHEN METABOLIC PANEL: CPT

## 2025-03-25 PROCEDURE — 85025 COMPLETE CBC W/AUTO DIFF WBC: CPT

## 2025-04-11 LAB
QT INTERVAL: 418 MS
QTC INTERVAL: 389 MS

## 2025-07-08 ENCOUNTER — TELEPHONE (OUTPATIENT)
Dept: FAMILY MEDICINE CLINIC | Facility: CLINIC | Age: 35
End: 2025-07-08

## 2025-07-08 ENCOUNTER — OFFICE VISIT (OUTPATIENT)
Dept: FAMILY MEDICINE CLINIC | Facility: CLINIC | Age: 35
End: 2025-07-08
Payer: COMMERCIAL

## 2025-07-08 VITALS
BODY MASS INDEX: 25.83 KG/M2 | HEART RATE: 75 BPM | HEIGHT: 71 IN | TEMPERATURE: 99.1 F | SYSTOLIC BLOOD PRESSURE: 118 MMHG | WEIGHT: 184.5 LBS | OXYGEN SATURATION: 97 % | DIASTOLIC BLOOD PRESSURE: 80 MMHG

## 2025-07-08 DIAGNOSIS — R05.1 ACUTE COUGH: ICD-10-CM

## 2025-07-08 DIAGNOSIS — J06.9 UPPER RESPIRATORY TRACT INFECTION, UNSPECIFIED TYPE: Primary | ICD-10-CM

## 2025-07-08 DIAGNOSIS — J02.9 SORE THROAT: ICD-10-CM

## 2025-07-08 LAB
EXPIRATION DATE: NORMAL
EXPIRATION DATE: NORMAL
INTERNAL CONTROL: NORMAL
INTERNAL CONTROL: NORMAL
Lab: NORMAL
Lab: NORMAL
S PYO AG THROAT QL: NEGATIVE
SARS-COV-2 AG UPPER RESP QL IA.RAPID: NOT DETECTED

## 2025-07-08 PROCEDURE — 1125F AMNT PAIN NOTED PAIN PRSNT: CPT

## 2025-07-08 PROCEDURE — 87426 SARSCOV CORONAVIRUS AG IA: CPT

## 2025-07-08 PROCEDURE — 87880 STREP A ASSAY W/OPTIC: CPT

## 2025-07-08 PROCEDURE — 99213 OFFICE O/P EST LOW 20 MIN: CPT

## 2025-07-08 RX ORDER — AMOXICILLIN 500 MG/1
1000 CAPSULE ORAL 2 TIMES DAILY
Qty: 28 CAPSULE | Refills: 0 | Status: SHIPPED | OUTPATIENT
Start: 2025-07-08

## 2025-07-08 RX ORDER — PREDNISONE 20 MG/1
20 TABLET ORAL 2 TIMES DAILY
Qty: 10 TABLET | Refills: 0 | Status: SHIPPED | OUTPATIENT
Start: 2025-07-08

## 2025-07-08 NOTE — PROGRESS NOTES
"Meghan Lui presents to [facility] with complaints of sore throat, sinus pressure/congestion, bilateral ear fullness, fatigue, fever.      History of Present Illness  This is a 35-year-old female who presents to clinic with complaints of sore throat, sinus pressure/congestion, bilateral ear fullness, fatigue, fever.    Patient states that her symptoms started on Sunday.  States that her boyfriend was sick last week after they got back from being in [place].  Patient states that he had similar symptoms and likely that is where she has contracted this illness from.  Patient states that she is got a really significantly sore throat, feels like pins-and-needles, is having a hard time in eating or drinking due to the severely soreness of this.  She has also had a lot of sinus pressure and congestion, started develop a slight cough, and she is got bilateral ear fullness.  She has had a fever as well although not sure what the temperature was she just knew that she was hot and broke out into a cold sweat and saturated her blankets overnight.  States that she is not had any GI involvement.  No shortness of breath or chest pain.  Feels very poorly.    The following portions of the patient's history were personally reviewed and updated as appropriate: allergies, current medications, past medical history, past surgical history, past family history, and past social history.       Objective   Vital Signs:   /80 (BP Location: Left arm, Patient Position: Sitting, Cuff Size: Adult)   Pulse 75   Temp 99.1 °F (37.3 °C)   Ht 180.3 cm (70.98\")   Wt 83.7 kg (184 lb 8 oz)   SpO2 97%   BMI 25.74 kg/m²     Body mass index is 25.74 kg/m².    All labs, imaging, test results, and specialty provider notes reviewed with patient.     Physical Exam  Vitals reviewed.   Constitutional:       Appearance: Normal appearance.   Cardiovascular:      Rate and Rhythm: Normal rate and regular rhythm.      " Pulses: Normal pulses.      Heart sounds: Normal heart sounds.   Pulmonary:      Effort: Pulmonary effort is normal.      Breath sounds: Normal breath sounds.   Neurological:      General: No focal deficit present.      Mental Status: She is alert and oriented to person, place, and time.                               Assessment and Plan:  Diagnoses and all orders for this visit:    1. Upper respiratory tract infection, unspecified type (Primary)    2. Sore throat  -     POCT rapid strep A    3. Acute cough  -     POCT SARS-CoV-2 Antigen MARV    Other orders  -     amoxicillin (AMOXIL) 500 MG capsule; Take 2 capsules by mouth 2 (Two) Times a Day.  Dispense: 28 capsule; Refill: 0  -     predniSONE (DELTASONE) 20 MG tablet; Take 1 tablet by mouth 2 (Two) Times a Day.  Dispense: 10 tablet; Refill: 0  -     Nystatin-Diphenhydramine-Hydrocortisone-Lidocaine; Take 5 mL by mouth Every 4 (Four) Hours As Needed (sore throat).  Dispense: 250 mL; Refill: 0      COVID, flu, and strep all negative in the office but upon exam patient has significantly red throat with swollen tonsils, sinus pressure and congestion, and developing sinus infection.  Will go ahead and treat with a course of amoxicillin, prednisone, and use Ira's Magic mouthwash to help with the severe sore throat.  Patient can continue antihistamine, decongestants over-the-counter as well.  Symptoms fail to improve or worsen further workup at that time.    Follow Up:  No follow-ups on file.    Patient was given instructions and counseling regarding her condition or for health maintenance advice. Please see specific information pulled into the AVS if appropriate.

## 2025-07-08 NOTE — TELEPHONE ENCOUNTER
Call[place] [facility] [place] [address] [facility] [address] - [phone] [facility] [phone]    Relationship to patient: Pharmacy      Patient is needing: PHARMACY NEEDS THE QUANTITY CLARIFIED ON THE COMPOUND ORDER FOR     Nystatin-Diphenhydramine-Hydrocortisone-Lidocaine       PLEASE GIVE DOSE AMOUNT OF EACH PART OF THE COMPOUND

## 2025-07-11 ENCOUNTER — LAB (OUTPATIENT)
Dept: LAB | Facility: HOSPITAL | Age: 35
End: 2025-07-11
Payer: COMMERCIAL

## 2025-07-11 DIAGNOSIS — E87.6 HYPOKALEMIA: ICD-10-CM

## 2025-07-11 DIAGNOSIS — Z13.220 SCREENING FOR LIPID DISORDERS: ICD-10-CM

## 2025-07-11 DIAGNOSIS — E61.1 IRON DEFICIENCY: ICD-10-CM

## 2025-07-11 DIAGNOSIS — E53.8 VITAMIN B12 DEFICIENCY: ICD-10-CM

## 2025-07-11 DIAGNOSIS — E55.9 VITAMIN D DEFICIENCY: ICD-10-CM

## 2025-07-11 DIAGNOSIS — Z01.89 ROUTINE LAB DRAW: ICD-10-CM

## 2025-07-11 LAB
25(OH)D3 SERPL-MCNC: 34 NG/ML (ref 30–100)
ALBUMIN SERPL-MCNC: 4 G/DL (ref 3.5–5.2)
ALBUMIN/GLOB SERPL: 1.4 G/DL
ALP SERPL-CCNC: 71 U/L (ref 39–117)
ALT SERPL W P-5'-P-CCNC: 28 U/L (ref 1–33)
ANION GAP SERPL CALCULATED.3IONS-SCNC: 10.6 MMOL/L (ref 5–15)
AST SERPL-CCNC: 32 U/L (ref 1–32)
BASOPHILS # BLD AUTO: 0.06 10*3/MM3 (ref 0–0.2)
BASOPHILS NFR BLD AUTO: 0.8 % (ref 0–1.5)
BILIRUB SERPL-MCNC: <0.2 MG/DL (ref 0–1.2)
BILIRUB UR QL STRIP: NEGATIVE
BUN SERPL-MCNC: 10 MG/DL (ref 6–20)
BUN/CREAT SERPL: 12 (ref 7–25)
CALCIUM SPEC-SCNC: 9.2 MG/DL (ref 8.6–10.5)
CHLORIDE SERPL-SCNC: 101 MMOL/L (ref 98–107)
CHOLEST SERPL-MCNC: 123 MG/DL (ref 0–200)
CLARITY UR: ABNORMAL
CO2 SERPL-SCNC: 25.4 MMOL/L (ref 22–29)
COLOR UR: ABNORMAL
CREAT SERPL-MCNC: 0.83 MG/DL (ref 0.57–1)
DEPRECATED RDW RBC AUTO: 42.5 FL (ref 37–54)
EGFRCR SERPLBLD CKD-EPI 2021: 94.4 ML/MIN/1.73
EOSINOPHIL # BLD AUTO: 0.01 10*3/MM3 (ref 0–0.4)
EOSINOPHIL NFR BLD AUTO: 0.1 % (ref 0.3–6.2)
ERYTHROCYTE [DISTWIDTH] IN BLOOD BY AUTOMATED COUNT: 12.3 % (ref 12.3–15.4)
FERRITIN SERPL-MCNC: 74.3 NG/ML (ref 13–150)
FOLATE SERPL-MCNC: 10.1 NG/ML (ref 4.78–24.2)
GLOBULIN UR ELPH-MCNC: 2.9 GM/DL
GLUCOSE SERPL-MCNC: 109 MG/DL (ref 65–99)
GLUCOSE UR STRIP-MCNC: NEGATIVE MG/DL
HCT VFR BLD AUTO: 40 % (ref 34–46.6)
HDLC SERPL-MCNC: 52 MG/DL (ref 40–60)
HGB BLD-MCNC: 13.3 G/DL (ref 12–15.9)
HGB UR QL STRIP.AUTO: NEGATIVE
HOLD SPECIMEN: NORMAL
IMM GRANULOCYTES # BLD AUTO: 0.03 10*3/MM3 (ref 0–0.05)
IMM GRANULOCYTES NFR BLD AUTO: 0.4 % (ref 0–0.5)
IRON 24H UR-MRATE: 49 MCG/DL (ref 37–145)
IRON SATN MFR SERPL: 14 % (ref 20–50)
KETONES UR QL STRIP: ABNORMAL
LDLC SERPL CALC-MCNC: 51 MG/DL (ref 0–100)
LDLC/HDLC SERPL: 0.95 {RATIO}
LEUKOCYTE ESTERASE UR QL STRIP.AUTO: NEGATIVE
LYMPHOCYTES # BLD AUTO: 1.15 10*3/MM3 (ref 0.7–3.1)
LYMPHOCYTES NFR BLD AUTO: 15.6 % (ref 19.6–45.3)
MCH RBC QN AUTO: 31.9 PG (ref 26.6–33)
MCHC RBC AUTO-ENTMCNC: 33.3 G/DL (ref 31.5–35.7)
MCV RBC AUTO: 95.9 FL (ref 79–97)
MONOCYTES # BLD AUTO: 0.17 10*3/MM3 (ref 0.1–0.9)
MONOCYTES NFR BLD AUTO: 2.3 % (ref 5–12)
NEUTROPHILS NFR BLD AUTO: 5.93 10*3/MM3 (ref 1.7–7)
NEUTROPHILS NFR BLD AUTO: 80.8 % (ref 42.7–76)
NITRITE UR QL STRIP: NEGATIVE
NRBC BLD AUTO-RTO: 0 /100 WBC (ref 0–0.2)
PH UR STRIP.AUTO: 7 [PH] (ref 5–8)
PLATELET # BLD AUTO: 229 10*3/MM3 (ref 140–450)
PMV BLD AUTO: 10.5 FL (ref 6–12)
POTASSIUM SERPL-SCNC: 3.9 MMOL/L (ref 3.5–5.2)
PROT SERPL-MCNC: 6.9 G/DL (ref 6–8.5)
PROT UR QL STRIP: ABNORMAL
RBC # BLD AUTO: 4.17 10*6/MM3 (ref 3.77–5.28)
SODIUM SERPL-SCNC: 137 MMOL/L (ref 136–145)
SP GR UR STRIP: >1.03 (ref 1–1.03)
TIBC SERPL-MCNC: 355 MCG/DL (ref 298–536)
TRANSFERRIN SERPL-MCNC: 238 MG/DL (ref 200–360)
TRIGL SERPL-MCNC: 107 MG/DL (ref 0–150)
TSH SERPL DL<=0.05 MIU/L-ACNC: 1.03 UIU/ML (ref 0.27–4.2)
UROBILINOGEN UR QL STRIP: ABNORMAL
VIT B12 BLD-MCNC: >2000 PG/ML (ref 211–946)
VLDLC SERPL-MCNC: 20 MG/DL (ref 5–40)
WBC NRBC COR # BLD AUTO: 7.35 10*3/MM3 (ref 3.4–10.8)

## 2025-07-11 PROCEDURE — 81003 URINALYSIS AUTO W/O SCOPE: CPT

## 2025-07-11 PROCEDURE — 82728 ASSAY OF FERRITIN: CPT

## 2025-07-11 PROCEDURE — 36415 COLL VENOUS BLD VENIPUNCTURE: CPT

## 2025-07-11 PROCEDURE — 82306 VITAMIN D 25 HYDROXY: CPT

## 2025-07-11 PROCEDURE — 80053 COMPREHEN METABOLIC PANEL: CPT

## 2025-07-11 PROCEDURE — 82746 ASSAY OF FOLIC ACID SERUM: CPT

## 2025-07-11 PROCEDURE — 84443 ASSAY THYROID STIM HORMONE: CPT

## 2025-07-11 PROCEDURE — 84466 ASSAY OF TRANSFERRIN: CPT

## 2025-07-11 PROCEDURE — 85025 COMPLETE CBC W/AUTO DIFF WBC: CPT

## 2025-07-11 PROCEDURE — 82607 VITAMIN B-12: CPT

## 2025-07-11 PROCEDURE — 83540 ASSAY OF IRON: CPT

## 2025-07-11 PROCEDURE — 80061 LIPID PANEL: CPT

## (undated) DEVICE — TUBING, SUCTION, 1/4" X 10', STRAIGHT: Brand: MEDLINE

## (undated) DEVICE — ADAPT CLN BIOGUARD AIR/H2O DISP

## (undated) DEVICE — GLV SURG SENSICARE POLYISPRN W/ALOE PF LF 6.5 GRN STRL

## (undated) DEVICE — ESOPHAGEAL BALLOON DILATATION CATHETER: Brand: CRE FIXED WIRE

## (undated) DEVICE — NDL HYPO PRECISIONGLIDE REG 21G 1 1/2

## (undated) DEVICE — BALN BOUGIE STD/TPR 38F

## (undated) DEVICE — GLV SURG SENSICARE PI MIC PF SZ8.5 LF STRL

## (undated) DEVICE — SOL NACL 0.9PCT 1000ML

## (undated) DEVICE — FRCP BX RADJAW4 NDL 2.8 240CM LG OG BX40

## (undated) DEVICE — LAPAROSCOPIC SMOKE FILTRATION SYSTEM: Brand: PALL LAPAROSHIELD® PLUS LAPAROSCOPIC SMOKE FILTRATION SYSTEM

## (undated) DEVICE — DISPOSABLE MONOPOLAR ENDOSCOPIC CORD 10 FT. (3M): Brand: KIRWAN

## (undated) DEVICE — MSK PROC CURAPLEX O2 2/ADAPT 7FT

## (undated) DEVICE — KT ORCA ORCAPOD DISP STRL

## (undated) DEVICE — LN SMPL CO2 SHTRM SD STREAM W/M LUER

## (undated) DEVICE — BLCK/BITE BLOX WO/DENTL/RIM W/STRAP 54F

## (undated) DEVICE — TROCAR: Brand: KII OPTICAL ACCESS SYSTEM

## (undated) DEVICE — GLV SURG SENSICARE PI MIC PF SZ6 LF STRL

## (undated) DEVICE — SENSR O2 OXIMAX FNGR A/ 18IN NONSTR

## (undated) DEVICE — SEALANT WND FIBRIN TISSEEL PREFIL/SYR/PRIMAFZ 2ML

## (undated) DEVICE — 500ML,PRESSURE INFUSER W/STOPCOCK: Brand: MEDLINE

## (undated) DEVICE — THE DEVICE IS A DISPOSABLE, LIGATURE PASSING, SUTURING APPARATUS AND NEEDLE GUIDE FOR THE ABDOMINAL WALL WHICH IS NON-POWERED, HAND-HELD, AND HAND-MANIPULATED,INTENDED TO BE USED IN VARIOUS GENERAL SURGICAL PROCEDURES. THE DEVICE INCLUDES A LIGATURE CARRIER PATHWAY, NEEDLE GUIDE, TWO NEEDLES, REFERENCE PLANE T-BAR, AND A GUIDEWIRE. THE HANDLE OF THE DEVICE PROVIDES TWO DIAMETRICALLY OPPOSED ENCLOSED GUIDEWAYS FOR THE ADVANCEMENT AND RETRACTION OF THE NEEDLES UNDER MANUAL CONTROL OF A PLUNGER LOCATED AT THE PROXIMAL END OF THE DEVICE.AS PART OF THE M-CLOSE CONVENIENCE KIT, A NERVE BLOCK NEEDLE IS INCLUDED FOR THE ADMINISTRATION OF LOCAL ANESTHETIC AGENTS TO PROVIDE REGIONAL AND LOCAL ANESTHESIA.  A TELFA ANTIMICROBIAL, NON-ADHERENT PAD IS ALSO PROVIDED IN THE KIT FOR USE AS A PRIMARY DRESSING FOR THE SURGICAL INCISION.: Brand: M-CLOSE KIT

## (undated) DEVICE — BLCK/BITE BLOX W/DENTL/RIM W/STRAP 54F

## (undated) DEVICE — DEV INFL ALLIANCE2 SYS

## (undated) DEVICE — PK OSC LAP SLV 40

## (undated) DEVICE — ENSEAL LAPAROSCOPIC TISSUE SEALER G2 ARTICULATING CURVED JAW FOR USE WITH G2 GENERATOR 5MM DIAMETER 45CM SHAFT LENGTH: Brand: ENSEAL

## (undated) DEVICE — PATIENT RETURN ELECTRODE, SINGLE-USE, CONTACT QUALITY MONITORING, ADULT, WITH 9FT CORD, FOR PATIENTS WEIGING OVER 33LBS. (15KG): Brand: MEGADYNE

## (undated) DEVICE — GLV SURG SENSICARE PI PF LF 8.5 GRN STRL

## (undated) DEVICE — CONN TBG Y 5 IN 1 LF STRL

## (undated) DEVICE — DEV INFL CRE STERIFLATE 60CC DISP

## (undated) DEVICE — LAPAROSCOPIC DISSECTOR: Brand: DEROYAL

## (undated) DEVICE — DECANTER BAG 9": Brand: MEDLINE INDUSTRIES, INC.

## (undated) DEVICE — TROC STANDARDTROCAR FOR/TITANSGS 19MM DISP STRL

## (undated) DEVICE — APL DUPLOSPRAYER MIS 40CM